# Patient Record
Sex: FEMALE | Race: WHITE | NOT HISPANIC OR LATINO
[De-identification: names, ages, dates, MRNs, and addresses within clinical notes are randomized per-mention and may not be internally consistent; named-entity substitution may affect disease eponyms.]

---

## 2017-01-26 ENCOUNTER — APPOINTMENT (OUTPATIENT)
Dept: OBGYN | Facility: CLINIC | Age: 22
End: 2017-01-26

## 2017-01-26 VITALS
SYSTOLIC BLOOD PRESSURE: 121 MMHG | DIASTOLIC BLOOD PRESSURE: 82 MMHG | HEART RATE: 101 BPM | BODY MASS INDEX: 24.1 KG/M2 | WEIGHT: 134.31 LBS | HEIGHT: 62.5 IN

## 2017-01-26 DIAGNOSIS — Z86.39 PERSONAL HISTORY OF OTHER ENDOCRINE, NUTRITIONAL AND METABOLIC DISEASE: ICD-10-CM

## 2017-01-26 DIAGNOSIS — Z01.419 ENCOUNTER FOR GYNECOLOGICAL EXAMINATION (GENERAL) (ROUTINE) W/OUT ABNORMAL FINDINGS: ICD-10-CM

## 2017-01-28 LAB
C TRACH DNA SPEC QL NAA+PROBE: NORMAL
C TRACH RRNA SPEC QL NAA+PROBE: NORMAL
CANDIDA VAG CYTO: NOT DETECTED
G VAGINALIS+PREV SP MTYP VAG QL MICRO: NOT DETECTED
HBV SURFACE AG SER QL: NONREACTIVE
HCV AB SER QL: NONREACTIVE
HCV S/CO RATIO: 0.15 S/CO
HIV1+2 AB SPEC QL IA.RAPID: NONREACTIVE
N GONORRHOEA DNA SPEC QL NAA+PROBE: NORMAL
N GONORRHOEA RRNA SPEC QL NAA+PROBE: NORMAL
SOURCE AMPLIFICATION: NORMAL
T PALLIDUM AB SER QL IA: NEGATIVE
T VAGINALIS VAG QL WET PREP: NOT DETECTED

## 2017-01-30 ENCOUNTER — OTHER (OUTPATIENT)
Age: 22
End: 2017-01-30

## 2017-11-30 ENCOUNTER — EMERGENCY (EMERGENCY)
Facility: HOSPITAL | Age: 22
LOS: 1 days | Discharge: HOME | End: 2017-11-30
Admitting: EMERGENCY MEDICINE
Payer: COMMERCIAL

## 2017-11-30 VITALS
SYSTOLIC BLOOD PRESSURE: 115 MMHG | OXYGEN SATURATION: 100 % | DIASTOLIC BLOOD PRESSURE: 85 MMHG | RESPIRATION RATE: 16 BRPM | TEMPERATURE: 98 F | HEART RATE: 88 BPM

## 2017-11-30 VITALS
TEMPERATURE: 98 F | HEART RATE: 92 BPM | RESPIRATION RATE: 18 BRPM | OXYGEN SATURATION: 99 % | SYSTOLIC BLOOD PRESSURE: 112 MMHG | DIASTOLIC BLOOD PRESSURE: 86 MMHG

## 2017-11-30 LAB
APPEARANCE UR: CLEAR — SIGNIFICANT CHANGE UP
BASE EXCESS BLDV CALC-SCNC: 5.9 MMOL/L — SIGNIFICANT CHANGE UP
BILIRUB UR-MCNC: NEGATIVE — SIGNIFICANT CHANGE UP
BLOOD GAS VENOUS - CREATININE: 0.65 MG/DL — SIGNIFICANT CHANGE UP (ref 0.5–1.3)
BLOOD UR QL VISUAL: NEGATIVE — SIGNIFICANT CHANGE UP
CHLORIDE BLDV-SCNC: 103 MMOL/L — SIGNIFICANT CHANGE UP (ref 96–108)
COLOR SPEC: YELLOW — SIGNIFICANT CHANGE UP
GAS PNL BLDV: 139 MMOL/L — SIGNIFICANT CHANGE UP (ref 136–146)
GLUCOSE BLDV-MCNC: 101 — HIGH (ref 70–99)
GLUCOSE UR-MCNC: NEGATIVE — SIGNIFICANT CHANGE UP
HCO3 BLDV-SCNC: 27 MMOL/L — SIGNIFICANT CHANGE UP (ref 20–27)
HCT VFR BLDV CALC: 47.1 % — HIGH (ref 34.5–45)
HGB BLDV-MCNC: 15.4 G/DL — SIGNIFICANT CHANGE UP (ref 11.5–15.5)
KETONES UR-MCNC: NEGATIVE — SIGNIFICANT CHANGE UP
LACTATE BLDV-MCNC: 1.6 MMOL/L — SIGNIFICANT CHANGE UP (ref 0.5–2)
LEUKOCYTE ESTERASE UR-ACNC: NEGATIVE — SIGNIFICANT CHANGE UP
MUCOUS THREADS # UR AUTO: SIGNIFICANT CHANGE UP
NITRITE UR-MCNC: NEGATIVE — SIGNIFICANT CHANGE UP
NON-SQ EPI CELLS # UR AUTO: <1 — SIGNIFICANT CHANGE UP
PCO2 BLDV: 56 MMHG — HIGH (ref 41–51)
PH BLDV: 7.36 PH — SIGNIFICANT CHANGE UP (ref 7.32–7.43)
PH UR: 6.5 — SIGNIFICANT CHANGE UP (ref 4.6–8)
PO2 BLDV: 31 MMHG — LOW (ref 35–40)
POTASSIUM BLDV-SCNC: 4.2 MMOL/L — SIGNIFICANT CHANGE UP (ref 3.4–4.5)
PROT UR-MCNC: 20 — SIGNIFICANT CHANGE UP
RBC CASTS # UR COMP ASSIST: SIGNIFICANT CHANGE UP (ref 0–?)
SAO2 % BLDV: 53.2 % — LOW (ref 60–85)
SP GR SPEC: 1.02 — SIGNIFICANT CHANGE UP (ref 1–1.03)
SQUAMOUS # UR AUTO: SIGNIFICANT CHANGE UP
UROBILINOGEN FLD QL: NORMAL E.U. — SIGNIFICANT CHANGE UP (ref 0.1–0.2)
WBC UR QL: SIGNIFICANT CHANGE UP (ref 0–?)

## 2017-11-30 PROCEDURE — 99284 EMERGENCY DEPT VISIT MOD MDM: CPT

## 2017-11-30 NOTE — ED PROVIDER NOTE - PLAN OF CARE
See your primary care doctor within 24-48 hours, bring copies of all reports with you. Advance your diet as tolerated. Return to the ER for worsening symptoms or any other concerns. See your primary care doctor within 24-48 hours, bring copies of all reports with you. Advance your diet as tolerated. Follow up with orthopedics for possible MRI of your back, referral list given. Return to the ER for worsening symptoms or any other concerns.

## 2017-11-30 NOTE — ED PROVIDER NOTE - MEDICAL DECISION MAKING DETAILS
21 y/o F with RLQ pain intermittent stabbing sensations 21 y/o F with RLQ pain intermittent stabbing sensations r/o ovarian cyst vs. torsion vs. appy- UCG, UA, Urine culture, Transvaginal US, CT Ab/pelivs

## 2017-11-30 NOTE — ED PROVIDER NOTE - PROGRESS NOTE DETAILS
PA Tiberio- labs negative. UA negative. US- trace free fluid, no ovarian cysts, good doppler flow. Awaiting results of CT scan. Pt stable. Signed out to JUAN Patrick JUAN Patrick: CT abdomen w/o acute intra-abdominal pathology. Pars interarticularis defects noted, pt aware of results. Will PO challenge and dc if pt tolerates. JUAN Patrick: CT abdomen w/o acute intra-abdominal pathology. Pars interarticularis defects noted, pt aware of results, will refer to spine. Will PO challenge and dc if pt tolerates.

## 2017-11-30 NOTE — ED ADULT TRIAGE NOTE - CHIEF COMPLAINT QUOTE
Patient c/o RLQ pain x3 days worsening yesterday. Patient c/o Nausea and diarrhea. Patient denies any blood in stool. Patient states pain is "dull then becomes stabbing and ripping". Denies any medical hx or sx. Seen at urgent care to r/o appendicitis and strep. Patient appears comfortable in triage.

## 2017-11-30 NOTE — ED PROVIDER NOTE - OBJECTIVE STATEMENT
23 y/o F started with sore throat 2 days ago- saw PCP- rapid strep negative. Started with RLQ pain x 2days. Describes it at "ripping and stabbing" constant but intensity changes intermittently. (+) nausea no vomiting. Denies dysuria, frequency of urination , hematuria, vaginal DC. LMP 11/19/17- states it was heavier than normal and clotty.  Sexually active- uses condoms "sometimes". No h/o STD's. Appears well. No distress on exam. States "I always have abdominal pain but never this bad". Previous transvaginal US beginning of this year showed possible ovarian cyst- pt cannot recall results.  Daily meds: OCP's  NKDA 21 y/o F started with sore throat 2 days ago- saw PCP- rapid strep negative. Started with RLQ pain shortly after x 2days. Describes it at constant, but changing in intensity at times "ripping and stabbing".  (+) nausea no vomiting. Denies dysuria, frequency of urination , hematuria, vaginal DC. LMP 11/19/17- states it was heavier than normal and clotty.  Sexually active- uses condoms "sometimes". No h/o STD's. Appears well. No distress on exam. States "I always have abdominal pain but never this bad". Previous transvaginal US beginning of this year showed possible ovarian cyst- pt cannot recall results.  Daily meds: OCP's  NKDA

## 2017-11-30 NOTE — ED PROVIDER NOTE - CARE PLAN
Principal Discharge DX:	Right lower quadrant abdominal pain Principal Discharge DX:	Right lower quadrant abdominal pain  Instructions for follow-up, activity and diet:	See your primary care doctor within 24-48 hours, bring copies of all reports with you. Advance your diet as tolerated. Return to the ER for worsening symptoms or any other concerns. Principal Discharge DX:	Right lower quadrant abdominal pain  Instructions for follow-up, activity and diet:	See your primary care doctor within 24-48 hours, bring copies of all reports with you. Advance your diet as tolerated. Follow up with orthopedics for possible MRI of your back, referral list given. Return to the ER for worsening symptoms or any other concerns.

## 2017-11-30 NOTE — ED ADULT NURSE NOTE - OBJECTIVE STATEMENT
Pt rcvd to int12 c./o rlq abd pain, x2days, intermittent and severe in nature.  Denies dysuria/hematuria/vaginal dc.  LMP 11/19/17.  Pt appearing well, no distress noted.  Endorses nausea, no vomiting. IV placed to R AC #20g, labs drawn/sent per orders ucg neg, urine sent, pt taken to US/CT. Awaiting results. stable at this time.

## 2017-12-01 LAB
ALBUMIN SERPL ELPH-MCNC: 4.4 G/DL — SIGNIFICANT CHANGE UP (ref 3.3–5)
ALP SERPL-CCNC: 62 U/L — SIGNIFICANT CHANGE UP (ref 40–120)
ALT FLD-CCNC: 23 U/L — SIGNIFICANT CHANGE UP (ref 4–33)
AST SERPL-CCNC: 21 U/L — SIGNIFICANT CHANGE UP (ref 4–32)
BASOPHILS # BLD AUTO: 0.04 K/UL — SIGNIFICANT CHANGE UP (ref 0–0.2)
BASOPHILS NFR BLD AUTO: 0.4 % — SIGNIFICANT CHANGE UP (ref 0–2)
BILIRUB SERPL-MCNC: 0.4 MG/DL — SIGNIFICANT CHANGE UP (ref 0.2–1.2)
BUN SERPL-MCNC: 15 MG/DL — SIGNIFICANT CHANGE UP (ref 7–23)
CALCIUM SERPL-MCNC: 9.9 MG/DL — SIGNIFICANT CHANGE UP (ref 8.4–10.5)
CHLORIDE SERPL-SCNC: 104 MMOL/L — SIGNIFICANT CHANGE UP (ref 98–107)
CO2 SERPL-SCNC: 29 MMOL/L — SIGNIFICANT CHANGE UP (ref 22–31)
CREAT SERPL-MCNC: 0.69 MG/DL — SIGNIFICANT CHANGE UP (ref 0.5–1.3)
EOSINOPHIL # BLD AUTO: 0.15 K/UL — SIGNIFICANT CHANGE UP (ref 0–0.5)
EOSINOPHIL NFR BLD AUTO: 1.5 % — SIGNIFICANT CHANGE UP (ref 0–6)
GLUCOSE SERPL-MCNC: 102 MG/DL — HIGH (ref 70–99)
HCT VFR BLD CALC: 42 % — SIGNIFICANT CHANGE UP (ref 34.5–45)
HGB BLD-MCNC: 15.1 G/DL — SIGNIFICANT CHANGE UP (ref 11.5–15.5)
IMM GRANULOCYTES # BLD AUTO: 0.04 # — SIGNIFICANT CHANGE UP
IMM GRANULOCYTES NFR BLD AUTO: 0.4 % — SIGNIFICANT CHANGE UP (ref 0–1.5)
LYMPHOCYTES # BLD AUTO: 2.34 K/UL — SIGNIFICANT CHANGE UP (ref 1–3.3)
LYMPHOCYTES # BLD AUTO: 23.1 % — SIGNIFICANT CHANGE UP (ref 13–44)
MCHC RBC-ENTMCNC: 31.5 PG — SIGNIFICANT CHANGE UP (ref 27–34)
MCHC RBC-ENTMCNC: 36 % — SIGNIFICANT CHANGE UP (ref 32–36)
MCV RBC AUTO: 87.7 FL — SIGNIFICANT CHANGE UP (ref 80–100)
MONOCYTES # BLD AUTO: 0.69 K/UL — SIGNIFICANT CHANGE UP (ref 0–0.9)
MONOCYTES NFR BLD AUTO: 6.8 % — SIGNIFICANT CHANGE UP (ref 2–14)
NEUTROPHILS # BLD AUTO: 6.86 K/UL — SIGNIFICANT CHANGE UP (ref 1.8–7.4)
NEUTROPHILS NFR BLD AUTO: 67.8 % — SIGNIFICANT CHANGE UP (ref 43–77)
NRBC # FLD: 0 — SIGNIFICANT CHANGE UP
PLATELET # BLD AUTO: 223 K/UL — SIGNIFICANT CHANGE UP (ref 150–400)
PMV BLD: 9.4 FL — SIGNIFICANT CHANGE UP (ref 7–13)
POTASSIUM SERPL-MCNC: 4.8 MMOL/L — SIGNIFICANT CHANGE UP (ref 3.5–5.3)
POTASSIUM SERPL-SCNC: 4.8 MMOL/L — SIGNIFICANT CHANGE UP (ref 3.5–5.3)
PROT SERPL-MCNC: 7.3 G/DL — SIGNIFICANT CHANGE UP (ref 6–8.3)
RBC # BLD: 4.79 M/UL — SIGNIFICANT CHANGE UP (ref 3.8–5.2)
RBC # FLD: 12.1 % — SIGNIFICANT CHANGE UP (ref 10.3–14.5)
SODIUM SERPL-SCNC: 145 MMOL/L — SIGNIFICANT CHANGE UP (ref 135–145)
WBC # BLD: 10.12 K/UL — SIGNIFICANT CHANGE UP (ref 3.8–10.5)
WBC # FLD AUTO: 10.12 K/UL — SIGNIFICANT CHANGE UP (ref 3.8–10.5)

## 2017-12-01 PROCEDURE — 76830 TRANSVAGINAL US NON-OB: CPT | Mod: 26

## 2017-12-01 PROCEDURE — 74177 CT ABD & PELVIS W/CONTRAST: CPT | Mod: 26

## 2017-12-01 RX ORDER — KETOROLAC TROMETHAMINE 30 MG/ML
30 SYRINGE (ML) INJECTION ONCE
Qty: 0 | Refills: 0 | Status: DISCONTINUED | OUTPATIENT
Start: 2017-12-01 | End: 2017-12-01

## 2017-12-01 RX ADMIN — Medication 30 MILLIGRAM(S): at 02:11

## 2017-12-02 LAB
BACTERIA UR CULT: SIGNIFICANT CHANGE UP
SPECIMEN SOURCE: SIGNIFICANT CHANGE UP

## 2017-12-05 ENCOUNTER — APPOINTMENT (OUTPATIENT)
Dept: OBGYN | Facility: CLINIC | Age: 22
End: 2017-12-05
Payer: COMMERCIAL

## 2017-12-05 VITALS
SYSTOLIC BLOOD PRESSURE: 106 MMHG | BODY MASS INDEX: 25.5 KG/M2 | HEIGHT: 62.5 IN | DIASTOLIC BLOOD PRESSURE: 71 MMHG | WEIGHT: 142.13 LBS | HEART RATE: 93 BPM

## 2017-12-05 PROCEDURE — 99214 OFFICE O/P EST MOD 30 MIN: CPT

## 2017-12-05 RX ORDER — FLUTICASONE PROPIONATE 50 UG/1
50 SPRAY, METERED NASAL
Qty: 16 | Refills: 0 | Status: COMPLETED | COMMUNITY
Start: 2017-11-21

## 2017-12-05 RX ORDER — PREDNISONE 20 MG/1
20 TABLET ORAL
Qty: 10 | Refills: 0 | Status: COMPLETED | COMMUNITY
Start: 2017-07-17

## 2017-12-05 RX ORDER — AZITHROMYCIN 250 MG/1
250 TABLET, FILM COATED ORAL
Qty: 6 | Refills: 0 | Status: COMPLETED | COMMUNITY
Start: 2017-07-17

## 2017-12-05 RX ORDER — DICLOFENAC SODIUM 3 G/100G
3 GEL TOPICAL
Qty: 100 | Refills: 0 | Status: COMPLETED | COMMUNITY
Start: 2017-11-20

## 2017-12-05 RX ORDER — LIDOCAINE 5 G/100G
5 OINTMENT TOPICAL
Qty: 35 | Refills: 0 | Status: COMPLETED | COMMUNITY
Start: 2017-11-20

## 2017-12-08 LAB
C TRACH RRNA SPEC QL NAA+PROBE: NOT DETECTED
CANDIDA VAG CYTO: NOT DETECTED
G VAGINALIS+PREV SP MTYP VAG QL MICRO: NOT DETECTED
N GONORRHOEA RRNA SPEC QL NAA+PROBE: NOT DETECTED
SOURCE AMPLIFICATION: NORMAL
T VAGINALIS VAG QL WET PREP: NOT DETECTED

## 2018-11-05 ENCOUNTER — APPOINTMENT (OUTPATIENT)
Dept: PEDIATRIC ORTHOPEDIC SURGERY | Facility: CLINIC | Age: 23
End: 2018-11-05
Payer: COMMERCIAL

## 2018-11-05 PROCEDURE — 99215 OFFICE O/P EST HI 40 MIN: CPT | Mod: 25

## 2018-11-05 PROCEDURE — 72082 X-RAY EXAM ENTIRE SPI 2/3 VW: CPT

## 2018-11-09 ENCOUNTER — APPOINTMENT (OUTPATIENT)
Dept: CARDIOLOGY | Facility: CLINIC | Age: 23
End: 2018-11-09
Payer: COMMERCIAL

## 2018-11-09 ENCOUNTER — NON-APPOINTMENT (OUTPATIENT)
Age: 23
End: 2018-11-09

## 2018-11-09 VITALS
HEIGHT: 62.5 IN | RESPIRATION RATE: 12 BRPM | BODY MASS INDEX: 26.74 KG/M2 | WEIGHT: 149 LBS | HEART RATE: 101 BPM | SYSTOLIC BLOOD PRESSURE: 122 MMHG | OXYGEN SATURATION: 96 % | DIASTOLIC BLOOD PRESSURE: 73 MMHG

## 2018-11-09 PROCEDURE — 99204 OFFICE O/P NEW MOD 45 MIN: CPT

## 2018-11-09 PROCEDURE — 93000 ELECTROCARDIOGRAM COMPLETE: CPT

## 2018-11-09 NOTE — REASON FOR VISIT
[FreeTextEntry1] : 23 year old female.  Recently diagnosed with spondylolithesis (grade 4).  She needs to have surgery for this.  The surgery is scheduled for november 28th, at [facility] with Dr. Gooden and Dr. Sr  She had Xrays of the lumbar spine, she is awaiting for an MRI of the spine.  Currently she is limited in her activity due to back pain.  This occurred spontaneously, has been in pain since august.  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 \par Surgery will be with general anesthesia.  Describes a 2.5 hour procedure with 2 rods.  \par \par Medications:\par Junel  - OCP to regulate her period.  Has been on this 6 years.  Her ob is Merrill Reyes.  \par Tramadol as needed.\par \par Never smoker\par Social ETOH\par \par Family history - no family history of heart disease. \par Brother 17, healthy\par \par No VTE.  NO family history of VTE.  \par \par

## 2018-11-09 NOTE — ASSESSMENT
[FreeTextEntry1] : Assessment:. \par 1.  Pre-operative Cardiac Risk stratification\par 2.  Spondylolisthesis\par 3.  On OCP\par \par Plan\par 1.  Consider stopping Junel (it contains estrogen) and can increase her risk of VTE during surgery.  She will see her OB/GYN to discuss this.  \par 2.  She has good exercise tolerance, no chest pain, no arrhythmia, no murmur on exam, no ischemic changes on ecg.  She may proceed with planned orthopedic surgery without any additional cardiac testing\par 3.  DVT prophylaxis in the perioperative period\par 4.  Return post operatively for followup. \par \par \par Teresa

## 2018-11-09 NOTE — PHYSICAL EXAM
[General Appearance - Well Developed] : well developed [Normal Appearance] : normal appearance [Well Groomed] : well groomed [General Appearance - Well Nourished] : well nourished [No Deformities] : no deformities [General Appearance - In No Acute Distress] : no acute distress [Normal Conjunctiva] : the conjunctiva exhibited no abnormalities [Eyelids - No Xanthelasma] : the eyelids demonstrated no xanthelasmas [Normal Oral Mucosa] : normal oral mucosa [No Oral Pallor] : no oral pallor [No Oral Cyanosis] : no oral cyanosis [Normal Jugular Venous A Waves Present] : normal jugular venous A waves present [Normal Jugular Venous V Waves Present] : normal jugular venous V waves present [No Jugular Venous Baca A Waves] : no jugular venous baca A waves [Heart Rate And Rhythm] : heart rate and rhythm were normal [Heart Sounds] : normal S1 and S2 [Murmurs] : no murmurs present [Respiration, Rhythm And Depth] : normal respiratory rhythm and effort [Exaggerated Use Of Accessory Muscles For Inspiration] : no accessory muscle use [Auscultation Breath Sounds / Voice Sounds] : lungs were clear to auscultation bilaterally [Abdomen Soft] : soft [Abdomen Tenderness] : non-tender [Abdomen Mass (___ Cm)] : no abdominal mass palpated [Nail Clubbing] : no clubbing of the fingernails [Cyanosis, Localized] : no localized cyanosis [Petechial Hemorrhages (___cm)] : no petechial hemorrhages [Skin Color & Pigmentation] : normal skin color and pigmentation [] : no rash [No Venous Stasis] : no venous stasis [Skin Lesions] : no skin lesions [No Skin Ulcers] : no skin ulcer [No Xanthoma] : no  xanthoma was observed

## 2018-11-13 ENCOUNTER — APPOINTMENT (OUTPATIENT)
Dept: UROLOGY | Facility: CLINIC | Age: 23
End: 2018-11-13
Payer: COMMERCIAL

## 2018-11-13 VITALS — SYSTOLIC BLOOD PRESSURE: 120 MMHG | DIASTOLIC BLOOD PRESSURE: 78 MMHG

## 2018-11-13 DIAGNOSIS — M43.17 SPONDYLOLISTHESIS, LUMBOSACRAL REGION: ICD-10-CM

## 2018-11-13 PROCEDURE — 99203 OFFICE O/P NEW LOW 30 MIN: CPT

## 2018-11-15 ENCOUNTER — LABORATORY RESULT (OUTPATIENT)
Age: 23
End: 2018-11-15

## 2018-11-15 ENCOUNTER — APPOINTMENT (OUTPATIENT)
Dept: OBGYN | Facility: CLINIC | Age: 23
End: 2018-11-15
Payer: COMMERCIAL

## 2018-11-15 VITALS
BODY MASS INDEX: 26.55 KG/M2 | SYSTOLIC BLOOD PRESSURE: 118 MMHG | HEIGHT: 62.5 IN | DIASTOLIC BLOOD PRESSURE: 77 MMHG | HEART RATE: 109 BPM | WEIGHT: 148 LBS

## 2018-11-15 DIAGNOSIS — N76.0 ACUTE VAGINITIS: ICD-10-CM

## 2018-11-15 DIAGNOSIS — B96.89 ACUTE VAGINITIS: ICD-10-CM

## 2018-11-15 PROCEDURE — 99213 OFFICE O/P EST LOW 20 MIN: CPT

## 2018-11-16 ENCOUNTER — OUTPATIENT (OUTPATIENT)
Dept: OUTPATIENT SERVICES | Age: 23
LOS: 1 days | End: 2018-11-16

## 2018-11-16 VITALS
RESPIRATION RATE: 16 BRPM | HEIGHT: 63.07 IN | TEMPERATURE: 98 F | WEIGHT: 147.27 LBS | HEART RATE: 93 BPM | OXYGEN SATURATION: 97 % | DIASTOLIC BLOOD PRESSURE: 65 MMHG | SYSTOLIC BLOOD PRESSURE: 106 MMHG

## 2018-11-16 DIAGNOSIS — M43.17 SPONDYLOLISTHESIS, LUMBOSACRAL REGION: ICD-10-CM

## 2018-11-16 DIAGNOSIS — F41.9 ANXIETY DISORDER, UNSPECIFIED: ICD-10-CM

## 2018-11-16 DIAGNOSIS — Z98.890 OTHER SPECIFIED POSTPROCEDURAL STATES: Chronic | ICD-10-CM

## 2018-11-16 LAB
BLD GP AB SCN SERPL QL: NEGATIVE — SIGNIFICANT CHANGE UP
BUN SERPL-MCNC: 12 MG/DL — SIGNIFICANT CHANGE UP (ref 7–23)
CALCIUM SERPL-MCNC: 10.3 MG/DL — SIGNIFICANT CHANGE UP (ref 8.4–10.5)
CHLORIDE SERPL-SCNC: 102 MMOL/L — SIGNIFICANT CHANGE UP (ref 98–107)
CO2 SERPL-SCNC: 25 MMOL/L — SIGNIFICANT CHANGE UP (ref 22–31)
CREAT SERPL-MCNC: 0.69 MG/DL — SIGNIFICANT CHANGE UP (ref 0.5–1.3)
GLUCOSE SERPL-MCNC: 85 MG/DL — SIGNIFICANT CHANGE UP (ref 70–99)
HCG SERPL-ACNC: < 5 MIU/ML — SIGNIFICANT CHANGE UP
HCT VFR BLD CALC: 46.1 % — HIGH (ref 34.5–45)
HGB BLD-MCNC: 15.8 G/DL — HIGH (ref 11.5–15.5)
MCHC RBC-ENTMCNC: 31.3 PG — SIGNIFICANT CHANGE UP (ref 27–34)
MCHC RBC-ENTMCNC: 34.3 % — SIGNIFICANT CHANGE UP (ref 32–36)
MCV RBC AUTO: 91.3 FL — SIGNIFICANT CHANGE UP (ref 80–100)
NRBC # FLD: 0 — SIGNIFICANT CHANGE UP
PLATELET # BLD AUTO: 267 K/UL — SIGNIFICANT CHANGE UP (ref 150–400)
PMV BLD: 9.3 FL — SIGNIFICANT CHANGE UP (ref 7–13)
POTASSIUM SERPL-MCNC: 5.2 MMOL/L — SIGNIFICANT CHANGE UP (ref 3.5–5.3)
POTASSIUM SERPL-SCNC: 5.2 MMOL/L — SIGNIFICANT CHANGE UP (ref 3.5–5.3)
RBC # BLD: 5.05 M/UL — SIGNIFICANT CHANGE UP (ref 3.8–5.2)
RBC # FLD: 12.4 % — SIGNIFICANT CHANGE UP (ref 10.3–14.5)
RH IG SCN BLD-IMP: POSITIVE — SIGNIFICANT CHANGE UP
SODIUM SERPL-SCNC: 140 MMOL/L — SIGNIFICANT CHANGE UP (ref 135–145)
WBC # BLD: 6.88 K/UL — SIGNIFICANT CHANGE UP (ref 3.8–10.5)
WBC # FLD AUTO: 6.88 K/UL — SIGNIFICANT CHANGE UP (ref 3.8–10.5)

## 2018-11-16 NOTE — H&P PST ADULT - PROBLEM SELECTOR PLAN 2
Discussed pre sedation with patient and in agreement she would benefit from pre sedation. Order place on hold.

## 2018-11-16 NOTE — H&P PST ADULT - GENITOURINARY COMMENTS
reports she was recently treated for Chlamydia, followed up with GYN on 11/15 and symptoms have resolved.

## 2018-11-16 NOTE — H&P PST ADULT - NSANTHOSAYNRD_GEN_A_CORE
No. LEIGH screening performed.  STOP BANG Legend: 0-2 = LOW Risk; 3-4 = INTERMEDIATE Risk; 5-8 = HIGH Risk

## 2018-11-16 NOTE — H&P PST ADULT - GASTROINTESTINAL COMMENTS
reports chronic "stomach issues" and diarrhea. Reports had endoscopy and colonoscopy done which showed areas of irritation related to reflux and IBS.

## 2018-11-16 NOTE — H&P PST ADULT - ANESTHESIA, PREVIOUS REACTION, PROFILE
h/o of colonoscopy/ endoscopy for which patient reports she woke up agitated. No prior exposure to GA

## 2018-11-16 NOTE — H&P PST ADULT - ASSESSMENT
24yo female with PMHx of anxiety, depression and spondylolisthesis, no PSH., patient has undergone endoscopy and colonoscopy in the past and reports she was told she woke up er agitated. CBC, BMP, T/S and HCG sent as indicated today. Urine cup with detailed instructions given for DOS.  No evidence of acute illness or infection. Child life prep with family. CHG wipes given and detailed instructions given.

## 2018-11-16 NOTE — H&P PST ADULT - BACK COMMENTS
vertebral tenderness on palpation of lower back with pain radiating to bilateral flank, worse on right than left

## 2018-11-16 NOTE — H&P PST ADULT - MUSCULOSKELETAL COMMENTS
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 Found to have Grade 4 Spondylolisthesis, scheduled for PSF T4-L4 posterior spinal fusion with instrumentation with Dr. Gooden on 11/28/18.

## 2018-11-16 NOTE — H&P PST ADULT - ENMT COMMENTS
reports h/o frequent nose bleeds, had nose cauterized x2, no recent nose bleeds, easy to stop bleeding

## 2018-11-16 NOTE — H&P PST ADULT - PROBLEM SELECTOR PLAN 1
T4-L4 posterior spinal fusion with instrumentation with possible interbody with Dr Gooden and Dr. Pollack on 11/28/18 at [facility].

## 2018-11-16 NOTE — H&P PST ADULT - OTHER CARE PROVIDERS
Dr. Moore (Cards) Dr. Moore (Cards) Dr. Munir Reyes (OB GYN) Dr. Moore (Cards) Dr. Munir Reyes (OB GYN), therapist

## 2018-11-16 NOTE — H&P PST ADULT - REASON FOR ADMISSION
PST evaluation prior to T4-L4 posterior spinal fusion with instrumentation with possible interbody with Dr Gooden and Dr. Pollack on 11/28/18 at [facility].

## 2018-11-16 NOTE — H&P PST ADULT - PSYCHIATRIC COMMENTS
Follows with therapist for h/o depression and anxiety. H/o of suicidal ideation. Reports feeling supported by family regarding upcoming surgery.

## 2018-11-17 LAB
BACTERIA UR CULT: NORMAL
CANDIDA VAG CYTO: NOT DETECTED
G VAGINALIS+PREV SP MTYP VAG QL MICRO: DETECTED
HBV SURFACE AG SER QL: NONREACTIVE
HCG SERPL-MCNC: <1 MIU/ML
HCV AB SER QL: NONREACTIVE
HCV S/CO RATIO: 0.1 S/CO
HIV1+2 AB SPEC QL IA.RAPID: NONREACTIVE
T PALLIDUM AB SER QL IA: NEGATIVE
T VAGINALIS VAG QL WET PREP: NOT DETECTED

## 2018-11-23 PROBLEM — M41.125 ADOLESCENT IDIOPATHIC SCOLIOSIS OF THORACOLUMBAR REGION: Status: ACTIVE | Noted: 2018-11-02

## 2018-11-24 NOTE — ASSESSMENT
[FreeTextEntry1] : 23 year old female presents with Grade 4 Spondylolisthesis. Clinical imaging and exam were reviewed with patient and parent at length. Patient and parent understands this condition requires surgical correction. Surgery was discussed in detail. The estela-operative plan and care was also explained. The patient will be scheduled for posterior spinal fusion with BMP and instrumentation: DOS Nov 28th. I am sending her for an MRI of the entire spine to rule out nerve abnormalities.  All the risks and complications of surgery including the risk of infection, nonunion, implant failure, complete paralysis, incomplete paralysis, bladder/bowel paralysis, organ injury, vascular injury, mortality, CSF leak, pleural leak, decompensation, resurgery, extension of fusion, junctional kyphosis, arthritis, organ injury, vascular injury, mortality, screw misplacement, and need for screw removal were explained. Motrin was recommended for pain management until surgery. Pt was advised not to get pregnant for a year after surgery due to side effects of BMP. All questions were answered. Understanding verbalized. Will follow up with me for further preoperative discussion once all testing has been completed.

## 2018-11-24 NOTE — HISTORY OF PRESENT ILLNESS
[Stable] : stable [6] : currently ~his/her~ pain is 6 out of 10 [Daily] : ~He/She~ states the symptoms seem to be occuring daily [FreeTextEntry1] : 23 year old female presents for lower back pain evaluation. 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 This is a new injury unrelated to the usual back problems she has had as a result of being an active dancer. She reports the pain radiates to the lower sides of her back. She is unable to cough or sneeze standing up. Pt denies numbness/tingling/weakness to the LE, radiating LE pain, and bladder/bowel dysfunction. She is here for evaluation and management of the pain.

## 2018-11-24 NOTE — REASON FOR VISIT
[Initial Evaluation] : an initial evaluation [Patient] : patient [Mother] : mother [FreeTextEntry1] : back pain

## 2018-11-24 NOTE — PHYSICAL EXAM
[FreeTextEntry1] : General: Patient is awake and alert and in no acute distress.\par Skin: The skin is intact, warm, pink, and dry over the area examined.\par Eyes: Pupils are equally round. Extraocular movements are intact. There is no gross deformity appreciated.\par ENT: normal ears, normal nose and normal lips.\par Cardiovascular: There is brisk capillary refill in the digits of the affected extremity. They are symmetric pulses in the bilateral upper and lower extremities.\par Respiratory: The patient is in no apparent respiratory distress. They're taking full deep breaths without use of accessory muscles or evidence of audible wheezes or stridor without the use of a stethoscope.\par Neurological: 5/5 motor strength in the main muscle groups of bilateral upper and lower extremities, sensory intact in bilateral upper and lower extremities.2+/Symmetric deep tendon reflexes were present in bilateral biceps and bilateral achilles . abdominal deep tendon reflexes are symmetrical in all 4 quadrants.\par Musculoskeletal:. normal gait for age. normal clinical alignment in upper and lower extremities. full range of motion in bilateral upper and lower extremities.\par  \par -No neurologic deficits.\par -lumbar spine has palpable stepoff.

## 2018-11-24 NOTE — DATA REVIEWED
[de-identified] : AP/lateral/oblique Xray of the spine reviewed today demonstrates l5/s1 Spondylolisthesis.

## 2018-11-24 NOTE — ADDENDUM
[FreeTextEntry1] : Documented by Lindsey Hung acting as a scribe for Dr. Yosi Gooden on 11/05/2018 .\par All medical record entries made by the Scribe were at my, Dr. Gooden, direction and personally dictated by me on 11/05/2018 . I have reviewed the chart and agree that the record accurately reflects my personal performance of the history, physical exam, assessment and plan. I have also personally directed, reviewed, and agree with the discharge instructions.

## 2018-11-26 ENCOUNTER — APPOINTMENT (OUTPATIENT)
Dept: PULMONOLOGY | Facility: CLINIC | Age: 23
End: 2018-11-26

## 2018-11-26 ENCOUNTER — APPOINTMENT (OUTPATIENT)
Dept: PEDIATRIC ORTHOPEDIC SURGERY | Facility: CLINIC | Age: 23
End: 2018-11-26

## 2018-11-26 DIAGNOSIS — M41.125 ADOLESCENT IDIOPATHIC SCOLIOSIS, THORACOLUMBAR REGION: ICD-10-CM

## 2018-12-27 ENCOUNTER — APPOINTMENT (OUTPATIENT)
Dept: PEDIATRIC ORTHOPEDIC SURGERY | Facility: CLINIC | Age: 23
End: 2018-12-27

## 2019-01-22 ENCOUNTER — APPOINTMENT (OUTPATIENT)
Dept: OBGYN | Facility: CLINIC | Age: 24
End: 2019-01-22

## 2019-02-07 ENCOUNTER — EMERGENCY (EMERGENCY)
Facility: HOSPITAL | Age: 24
LOS: 1 days | Discharge: HOME | End: 2019-02-07
Attending: EMERGENCY MEDICINE | Admitting: EMERGENCY MEDICINE
Payer: COMMERCIAL

## 2019-02-07 VITALS
OXYGEN SATURATION: 98 % | SYSTOLIC BLOOD PRESSURE: 114 MMHG | DIASTOLIC BLOOD PRESSURE: 66 MMHG | RESPIRATION RATE: 16 BRPM | HEART RATE: 119 BPM | TEMPERATURE: 98 F

## 2019-02-07 VITALS
RESPIRATION RATE: 15 BRPM | OXYGEN SATURATION: 98 % | DIASTOLIC BLOOD PRESSURE: 66 MMHG | SYSTOLIC BLOOD PRESSURE: 108 MMHG | HEART RATE: 96 BPM

## 2019-02-07 DIAGNOSIS — Z98.890 OTHER SPECIFIED POSTPROCEDURAL STATES: Chronic | ICD-10-CM

## 2019-02-07 PROCEDURE — 99283 EMERGENCY DEPT VISIT LOW MDM: CPT

## 2019-02-07 RX ORDER — HYDROXYZINE HCL 10 MG
50 TABLET ORAL ONCE
Qty: 0 | Refills: 0 | Status: COMPLETED | OUTPATIENT
Start: 2019-02-07 | End: 2019-02-07

## 2019-02-07 NOTE — ED PROVIDER NOTE - SKIN, MLM
Diffuse blanching erythematous lesions with central clearing of various size, no oral lesions, or involvement of palms and soles

## 2019-02-07 NOTE — ED PROVIDER NOTE - OBJECTIVE STATEMENT
23 F h/o anxiety, s/p spinal fusion in december, p/w rash. States 5 days ago, noticed redness and itching to bilateral knees. Next day started to spread, went to urgent care and given prednisone and benadryl. yesterday rash was even worse, more diffuse, painful and itchy. States it is constantly changing with lesions changing in size/shape/and coallescing. Went to a dermatologist yesterday and told to continue prednisone and started on zyrtec and triamcinolone (only started the cream today). Still with diffuse itching, and unable to sleep. No rash on palms or soles or sores in mouth. No recent travel. No new medication. No recent illnesses. no fever/chills. 23 F h/o anxiety, s/p spinal fusion in december, p/w rash. States 5 days ago, noticed redness and itching to bilateral knees. Next day started to spread, went to urgent care and given prednisone and benadryl. yesterday rash was even worse, more diffuse, painful and itchy. States it is constantly changing with lesions changing in size/shape/and coallescing. Went to a dermatologist yesterday and told to continue prednisone and started on zyrtec and triamcinolone (only started the cream today). Still with diffuse itching, and unable to sleep. No rash on palms or soles or sores in mouth. No recent travel. No new medication. No recent illnesses. no fever/chills. No sick contacts. No difficulty breathing or swallowing. No skin sloughing.

## 2019-02-07 NOTE — ED PROVIDER NOTE - NSFOLLOWUPINSTRUCTIONS_ED_ALL_ED_FT
You were seen today for a rash. You should continue taking prednisone and start taking atarax for itching. Also continue to use triamcinolone. This is likely viral. Return if you have fever, involvement of the mouth/rectum/vagina, confusion or lethargy. We emailed [facility] for rapid appointment.

## 2019-02-07 NOTE — ED PROVIDER NOTE - MEDICAL DECISION MAKING DETAILS
23 F with diffuse erythematous rash with central clearing without MM involvement. Anxious and crying but otherwise non toxic appearing. Possible urticaria vs EM. Will treat symptomatically

## 2019-02-07 NOTE — ED PROVIDER NOTE - CPE EDP SKIN NORM
normal... MD Graf:  patient seen and evaluated with the PA.  I was present for key portions of the History and Physical, and I agree with the Impression and Plan.    MD Graf:  46 yo M, c/o acute (days) on chronic (months) fever.  Tm 103 at home.  Also c/o acute (days) on chronic (years) upper abdominal pain.  patient has had multiple admits for fevers of various sources (influenza B, SBP).  Has Hx of latent TB; but AFB smears are negative (5/2017)  Mhx of alcoholic cirrhosis.  VS - wnl.  Physical Exam: adult M, chronically ill-appearing, NCAT, PERRL, skin:  vitiligo.  Abd:  slightly distended, mild upper abd TTP; no R/G.  Impression:  febrile illness in chronically ill male.  Plan:  fever w/u, reassess, IVF.

## 2019-02-07 NOTE — ED PROVIDER NOTE - CONSTITUTIONAL, MLM
normal... Crying, well nourished, awake, alert, oriented to person, place, time/situation and in no apparent distress.

## 2019-02-07 NOTE — ED PROVIDER NOTE - ATTENDING CONTRIBUTION TO CARE
AJM: Patient seen with resident and agree with above note. 23 F h/o anxiety, s/p spinal fusion in december, p/w rash. States 5 days ago, noticed redness and itching to bilateral knees. Next day started to spread, went to urgent care and given prednisone and benadryl. yesterday rash was even worse, more diffuse, painful and itchy. States it is constantly changing with lesions changing in size/shape/and coallescing. Went to a dermatologist yesterday and told to continue prednisone and started on zyrtec and triamcinolone (only started the cream today). Still with diffuse itching, and unable to sleep. No rash on palms or soles or sores in mouth. No recent travel. No new medication. No recent illnesses. no fever/chills. No sick contacts. No difficulty breathing or swallowing. No skin sloughing. Exam with blanching discrete areas of erythema, itchy not tender. No MM involvement. Normal resp exam. will add atararx, continue prednisone, email derm for appt in AM

## 2019-02-07 NOTE — ED PROVIDER NOTE - PROGRESS NOTE DETAILS
emailed [facility] for rapid follow up. vss. home with continued prednisone and atarax. return for fever, mucous membrane involvement, lethargy

## 2019-02-08 PROBLEM — M43.17 SPONDYLOLISTHESIS, LUMBOSACRAL REGION: Chronic | Status: ACTIVE | Noted: 2018-11-16

## 2019-02-08 RX ORDER — HYDROXYZINE HCL 10 MG
1 TABLET ORAL
Qty: 20 | Refills: 0
Start: 2019-02-08 | End: 2019-02-12

## 2019-02-08 NOTE — ED POST DISCHARGE NOTE - OTHER COMMUNICATION
JUAN CROFT: Received call from patient's mother, states pt didn't received Atarax in the pharmacy; chart reviewed; patient was treated w/ Atarax in the ER for itchiness and rash, d/c instruction was to continue Atarax and Prednisone. No pharmacy was setup in the system. Atarax 25mg Q6H prescribed to preferred pharmacy. Pt has f/u appointment with Dermatology on Monday.

## 2019-02-11 ENCOUNTER — APPOINTMENT (OUTPATIENT)
Dept: DERMATOLOGY | Facility: CLINIC | Age: 24
End: 2019-02-11
Payer: COMMERCIAL

## 2019-02-11 VITALS
BODY MASS INDEX: 24.8 KG/M2 | HEIGHT: 63 IN | DIASTOLIC BLOOD PRESSURE: 60 MMHG | WEIGHT: 140 LBS | SYSTOLIC BLOOD PRESSURE: 110 MMHG

## 2019-02-11 PROCEDURE — 99203 OFFICE O/P NEW LOW 30 MIN: CPT

## 2019-03-11 ENCOUNTER — APPOINTMENT (OUTPATIENT)
Dept: DERMATOLOGY | Facility: CLINIC | Age: 24
End: 2019-03-11
Payer: COMMERCIAL

## 2019-03-11 PROCEDURE — 99213 OFFICE O/P EST LOW 20 MIN: CPT

## 2019-05-19 NOTE — ED ADULT NURSE NOTE - NS ED NURSE LEVEL OF CONSCIOUSNESS SPEECH
Patient Seen in: [facility] Emergency Department    History   Patient presents with:  Laceration Abrasion (integumentary)    Stated Complaint: head lac, denies loc    HPI    Patient is an 8year-old who was leaning over to  that she when he Peripheral pulses are brisk in all 4 extremities. Normal capillary refill. SKIN: Well perfused, without cyanosis. No rashes. NEUROLOGIC: Cranial nerves II through XII are intact moving all extremities normally. No focal deficits visualized.   Negative Speaking Coherently

## 2019-06-10 ENCOUNTER — LABORATORY RESULT (OUTPATIENT)
Age: 24
End: 2019-06-10

## 2019-06-10 ENCOUNTER — APPOINTMENT (OUTPATIENT)
Dept: DERMATOLOGY | Facility: CLINIC | Age: 24
End: 2019-06-10
Payer: COMMERCIAL

## 2019-06-10 PROCEDURE — 99214 OFFICE O/P EST MOD 30 MIN: CPT

## 2019-06-11 LAB
ALBUMIN SERPL ELPH-MCNC: 5 G/DL
ALP BLD-CCNC: 67 U/L
ALT SERPL-CCNC: 11 U/L
ANION GAP SERPL CALC-SCNC: 11 MMOL/L
APPEARANCE: ABNORMAL
AST SERPL-CCNC: 12 U/L
BASOPHILS # BLD AUTO: 0.04 K/UL
BASOPHILS NFR BLD AUTO: 0.5 %
BILIRUB SERPL-MCNC: 0.4 MG/DL
BILIRUBIN URINE: NEGATIVE
BLOOD URINE: NEGATIVE
BUN SERPL-MCNC: 15 MG/DL
CALCIUM SERPL-MCNC: 10.3 MG/DL
CHLORIDE SERPL-SCNC: 104 MMOL/L
CO2 SERPL-SCNC: 26 MMOL/L
COLOR: YELLOW
CREAT SERPL-MCNC: 0.65 MG/DL
EOSINOPHIL # BLD AUTO: 0.1 K/UL
EOSINOPHIL NFR BLD AUTO: 1.1 %
GLUCOSE QUALITATIVE U: NEGATIVE
GLUCOSE SERPL-MCNC: 89 MG/DL
HCT VFR BLD CALC: 41.6 %
HGB BLD-MCNC: 13.6 G/DL
IMM GRANULOCYTES NFR BLD AUTO: 0.3 %
KETONES URINE: NEGATIVE
LEUKOCYTE ESTERASE URINE: NEGATIVE
LYMPHOCYTES # BLD AUTO: 1.9 K/UL
LYMPHOCYTES NFR BLD AUTO: 21.6 %
MAN DIFF?: NORMAL
MCHC RBC-ENTMCNC: 29.8 PG
MCHC RBC-ENTMCNC: 32.7 GM/DL
MCV RBC AUTO: 91.2 FL
MONOCYTES # BLD AUTO: 0.53 K/UL
MONOCYTES NFR BLD AUTO: 6 %
NEUTROPHILS # BLD AUTO: 6.21 K/UL
NEUTROPHILS NFR BLD AUTO: 70.5 %
NITRITE URINE: NEGATIVE
PH URINE: 6
PLATELET # BLD AUTO: 278 K/UL
POTASSIUM SERPL-SCNC: 5 MMOL/L
PROT SERPL-MCNC: 6.9 G/DL
PROTEIN URINE: NORMAL
RBC # BLD: 4.56 M/UL
RBC # FLD: 13 %
SODIUM SERPL-SCNC: 141 MMOL/L
SPECIFIC GRAVITY URINE: 1.03
TSH SERPL-ACNC: 1.06 UIU/ML
UROBILINOGEN URINE: NORMAL
WBC # FLD AUTO: 8.81 K/UL

## 2019-06-13 LAB — TSH RECEPTOR AB: <0.5 IU/L

## 2019-06-22 NOTE — ED PROVIDER NOTE - MUSCULOSKELETAL, MLM
Spine appears normal, range of motion is not limited, no muscle or joint tenderness Hand pain, right

## 2019-07-16 ENCOUNTER — APPOINTMENT (OUTPATIENT)
Dept: DERMATOLOGY | Facility: CLINIC | Age: 24
End: 2019-07-16

## 2019-08-14 NOTE — ED ADULT NURSE NOTE - PRIMARY CARE PROVIDER
Post-Care Instructions: I reviewed with the patient in detail post-care instructions. Patient should stay away from the sun and wear sun protection until treated areas are fully healed. Unk

## 2019-09-17 ENCOUNTER — APPOINTMENT (OUTPATIENT)
Dept: OBGYN | Facility: CLINIC | Age: 24
End: 2019-09-17
Payer: COMMERCIAL

## 2019-09-17 ENCOUNTER — LABORATORY RESULT (OUTPATIENT)
Age: 24
End: 2019-09-17

## 2019-09-17 VITALS
HEIGHT: 63 IN | SYSTOLIC BLOOD PRESSURE: 106 MMHG | HEART RATE: 105 BPM | BODY MASS INDEX: 25.73 KG/M2 | DIASTOLIC BLOOD PRESSURE: 68 MMHG | WEIGHT: 145.25 LBS

## 2019-09-17 DIAGNOSIS — Z87.42 PERSONAL HISTORY OF OTHER DISEASES OF THE FEMALE GENITAL TRACT: ICD-10-CM

## 2019-09-17 DIAGNOSIS — Z30.09 ENCOUNTER FOR OTHER GENERAL COUNSELING AND ADVICE ON CONTRACEPTION: ICD-10-CM

## 2019-09-17 PROCEDURE — 99395 PREV VISIT EST AGE 18-39: CPT

## 2019-09-17 RX ORDER — NORETHINDRONE ACETATE AND ETHINYL ESTRADIOL AND FERROUS FUMARATE 1.5-30(21)
1.5-3 KIT ORAL DAILY
Qty: 84 | Refills: 3 | Status: DISCONTINUED | COMMUNITY
Start: 2017-12-05 | End: 2019-09-17

## 2019-09-17 RX ORDER — METRONIDAZOLE 7.5 MG/G
0.75 GEL VAGINAL
Qty: 1 | Refills: 0 | Status: DISCONTINUED | COMMUNITY
Start: 2018-11-17 | End: 2019-09-17

## 2019-09-17 NOTE — PHYSICAL EXAM
[Awake] : awake [Alert] : alert [Goiter] : goiter [Soft] : soft [Oriented x3] : oriented to person, place, and time [Normal] : cervix [Discharge] : a  ~M vaginal discharge was present [Foul Smelling] : foul smelling [Moderate] : moderate [Frothy] : frothy [Clear] : clear [Uterine Adnexae] : were not tender and not enlarged [No Bleeding] : there was no active vaginal bleeding [LAD] : no lymphadenopathy [Acute Distress] : no acute distress [Thyroid Nodule] : no thyroid nodule [Mass] : no breast mass [Nipple Discharge] : no nipple discharge [Axillary LAD] : no axillary lymphadenopathy [Tender] : non tender

## 2019-09-17 NOTE — COUNSELING
[Breast Self Exam] : breast self exam [Nutrition] : nutrition [Exercise] : exercise [STD (testing, results, tx)] : STD (testing, results, tx) [Vitamins/Supplements] : vitamins/supplements [HIV Pretest] : HIV pretest [Contraception] : contraception [Safe Sexual Practices] : safe sexual practices

## 2019-09-17 NOTE — HISTORY OF PRESENT ILLNESS
[Sexually Active] : is sexually active [Monogamous] : is monogamous [Contraception] : does not use contraception

## 2019-09-18 LAB
C TRACH RRNA SPEC QL NAA+PROBE: NOT DETECTED
CANDIDA VAG CYTO: NOT DETECTED
G VAGINALIS+PREV SP MTYP VAG QL MICRO: DETECTED
HBV SURFACE AG SER QL: NONREACTIVE
HCV AB SER QL: NONREACTIVE
HCV S/CO RATIO: 0.11 S/CO
HIV1+2 AB SPEC QL IA.RAPID: NONREACTIVE
N GONORRHOEA RRNA SPEC QL NAA+PROBE: NOT DETECTED
SOURCE AMPLIFICATION: NORMAL
T PALLIDUM AB SER QL IA: NEGATIVE
T VAGINALIS VAG QL WET PREP: NOT DETECTED

## 2019-11-05 ENCOUNTER — MEDICATION RENEWAL (OUTPATIENT)
Age: 24
End: 2019-11-05

## 2019-12-03 LAB — CYTOLOGY CVX/VAG DOC THIN PREP: ABNORMAL

## 2019-12-10 ENCOUNTER — APPOINTMENT (OUTPATIENT)
Dept: OBGYN | Facility: CLINIC | Age: 24
End: 2019-12-10
Payer: COMMERCIAL

## 2019-12-10 VITALS
BODY MASS INDEX: 24.63 KG/M2 | SYSTOLIC BLOOD PRESSURE: 116 MMHG | DIASTOLIC BLOOD PRESSURE: 76 MMHG | HEIGHT: 63 IN | HEART RATE: 92 BPM | WEIGHT: 139 LBS

## 2019-12-10 DIAGNOSIS — N89.8 OTHER SPECIFIED NONINFLAMMATORY DISORDERS OF VAGINA: ICD-10-CM

## 2019-12-10 PROCEDURE — 99214 OFFICE O/P EST MOD 30 MIN: CPT

## 2019-12-10 RX ORDER — RANITIDINE 150 MG/1
150 TABLET ORAL
Qty: 1 | Refills: 2 | Status: DISCONTINUED | COMMUNITY
Start: 2019-02-11 | End: 2019-12-10

## 2019-12-10 RX ORDER — CETIRIZINE HYDROCHLORIDE 10 MG/1
10 TABLET, FILM COATED ORAL
Qty: 60 | Refills: 2 | Status: DISCONTINUED | COMMUNITY
Start: 2019-02-11 | End: 2019-12-10

## 2019-12-10 RX ORDER — METRONIDAZOLE 7.5 MG/G
0.75 GEL VAGINAL
Qty: 1 | Refills: 0 | Status: DISCONTINUED | COMMUNITY
Start: 2019-09-17 | End: 2019-12-10

## 2019-12-10 RX ORDER — HYDROXYZINE HYDROCHLORIDE 25 MG/1
25 TABLET ORAL
Qty: 30 | Refills: 2 | Status: DISCONTINUED | COMMUNITY
Start: 2019-02-11 | End: 2019-12-10

## 2019-12-10 RX ORDER — NAPROXEN 500 MG/1
500 TABLET ORAL
Qty: 1 | Refills: 2 | Status: DISCONTINUED | COMMUNITY
Start: 2017-12-13 | End: 2019-12-10

## 2019-12-10 RX ORDER — TRAMADOL HYDROCHLORIDE 50 MG/1
50 TABLET, COATED ORAL
Qty: 14 | Refills: 0 | Status: DISCONTINUED | COMMUNITY
Start: 2018-11-05 | End: 2019-12-10

## 2019-12-10 NOTE — PHYSICAL EXAM
[No Lesions] : no genitalia lesions [Normal] : cervix [Labia Majora] : labia major [Labia Minora] : labia minora [Discharge] : a  ~M vaginal discharge was present [White] : white [Moderate] : moderate [No Bleeding] : there was no active vaginal bleeding [Motion Tenderness] : there was no cervical motion tenderness [Tenderness] : nontender [Adnexa Tenderness] : were not tender

## 2019-12-11 ENCOUNTER — OTHER (OUTPATIENT)
Age: 24
End: 2019-12-11

## 2019-12-17 ENCOUNTER — OTHER (OUTPATIENT)
Age: 24
End: 2019-12-17

## 2019-12-17 LAB
C TRACH RRNA SPEC QL NAA+PROBE: NOT DETECTED
CANDIDA VAG CYTO: NOT DETECTED
G VAGINALIS+PREV SP MTYP VAG QL MICRO: DETECTED
HCG UR QL: NEGATIVE
N GONORRHOEA RRNA SPEC QL NAA+PROBE: NOT DETECTED
QUALITY CONTROL: YES
SOURCE AMPLIFICATION: NORMAL
T VAGINALIS VAG QL WET PREP: NOT DETECTED

## 2020-02-27 ENCOUNTER — APPOINTMENT (OUTPATIENT)
Dept: OBGYN | Facility: CLINIC | Age: 25
End: 2020-02-27
Payer: COMMERCIAL

## 2020-02-27 ENCOUNTER — ASOB RESULT (OUTPATIENT)
Age: 25
End: 2020-02-27

## 2020-02-27 PROCEDURE — 58300 INSERT INTRAUTERINE DEVICE: CPT

## 2020-02-27 PROCEDURE — 76830 TRANSVAGINAL US NON-OB: CPT

## 2020-02-27 RX ORDER — IBUPROFEN 600 MG/1
600 TABLET, FILM COATED ORAL
Qty: 0 | Refills: 0 | Status: COMPLETED | OUTPATIENT
Start: 2020-02-27

## 2020-02-27 RX ADMIN — IBUPROFEN 1 MG: 600 TABLET ORAL at 00:00

## 2020-02-27 RX ADMIN — COPPER 0: 313.4 INTRAUTERINE DEVICE INTRAUTERINE at 00:00

## 2020-02-27 NOTE — PROCEDURE
[IUD Placement] : intrauterine device (IUD) placement [Prevention of Pregnancy] : prevention of pregnancy [Risks] : risks [Benefits] : benefits [Alternatives] : alternatives [Patient] : patient [Infection] : infection [Bleeding] : bleeding [Pain] : pain [Expulsion] : expulsion [Failure] : failure [Uterine Perforation] : uterine perforation [CONSENT OBTAINED] : written consent was obtained prior to the procedure. [Neg Pregnancy Test] : a pregnancy test was negative [Neg GC/Chlamydia] : a a serum GC/Chlamydia was negative [Betadine] : Prepped with Betadine [Uterus Sounded to ___cm] : sounded to [unfilled]Ucm [Tenaculum] : a single toothed tenaculum [Post Placement Transvag. US] : post placement transvaginal ultrasound completed [Easy Passage] : allowed easy passage of a uterine sound without dilation [Lot Number: ___] : IUD lot number: [unfilled] [ParaGard IUD] : The ParaGard IUD was inserted to the fundus of the uterus.  The IUD strings were cut to an appropriate length. [Tolerated Well] : the patient tolerated the procedure well [Pelvic Pain] : the patient complained of pelvic pain [Motrin/Ibuprofen] : Motrin/Ibuprofen

## 2020-02-28 LAB
CANDIDA VAG CYTO: NOT DETECTED
G VAGINALIS+PREV SP MTYP VAG QL MICRO: DETECTED
T VAGINALIS VAG QL WET PREP: NOT DETECTED

## 2020-03-13 ENCOUNTER — APPOINTMENT (OUTPATIENT)
Dept: OBGYN | Facility: CLINIC | Age: 25
End: 2020-03-13
Payer: COMMERCIAL

## 2020-03-13 VITALS
HEIGHT: 63 IN | WEIGHT: 135 LBS | DIASTOLIC BLOOD PRESSURE: 67 MMHG | TEMPERATURE: 98.1 F | BODY MASS INDEX: 23.92 KG/M2 | HEART RATE: 92 BPM | SYSTOLIC BLOOD PRESSURE: 105 MMHG

## 2020-03-13 PROCEDURE — 99213 OFFICE O/P EST LOW 20 MIN: CPT

## 2020-03-13 RX ORDER — NORETHINDRONE ACETATE AND ETHINYL ESTRADIOL AND FERROUS FUMARATE 1.5-30(21)
1.5-3 KIT ORAL
Qty: 3 | Refills: 2 | Status: DISCONTINUED | COMMUNITY
Start: 2019-09-17 | End: 2020-03-13

## 2020-03-13 RX ORDER — METRONIDAZOLE 7.5 MG/G
0.75 GEL VAGINAL
Qty: 1 | Refills: 0 | Status: DISCONTINUED | COMMUNITY
Start: 2019-12-10 | End: 2020-03-13

## 2020-03-13 NOTE — PHYSICAL EXAM
[No Lesions] : no genitalia lesions [Normal] : cervix [Labia Majora] : labia major [Labia Minora] : labia minora [Discharge] : a  ~M vaginal discharge was present [Scant] : scant [White] : white [Thick] : thick [No Bleeding] : there was no active vaginal bleeding [IUD String] : had an IUD string protruding out

## 2020-08-11 ENCOUNTER — APPOINTMENT (OUTPATIENT)
Dept: INTERNAL MEDICINE | Facility: CLINIC | Age: 25
End: 2020-08-11

## 2020-09-15 ENCOUNTER — APPOINTMENT (OUTPATIENT)
Dept: OBGYN | Facility: CLINIC | Age: 25
End: 2020-09-15
Payer: COMMERCIAL

## 2020-09-15 VITALS
WEIGHT: 124 LBS | BODY MASS INDEX: 21.97 KG/M2 | SYSTOLIC BLOOD PRESSURE: 111 MMHG | TEMPERATURE: 98.4 F | HEART RATE: 103 BPM | DIASTOLIC BLOOD PRESSURE: 67 MMHG | HEIGHT: 63 IN

## 2020-09-15 PROCEDURE — 99213 OFFICE O/P EST LOW 20 MIN: CPT

## 2020-09-15 RX ORDER — METRONIDAZOLE 500 MG/1
500 TABLET ORAL TWICE DAILY
Qty: 14 | Refills: 0 | Status: DISCONTINUED | COMMUNITY
Start: 2020-03-13 | End: 2020-09-15

## 2020-09-16 NOTE — PHYSICAL EXAM
[Labia Majora] : normal [Labia Minora] : normal [Scant] : scant [Discharge] : a  ~M vaginal discharge was present [White] : white [Thin] : thin [No Bleeding] : There was no active vaginal bleeding [Normal] : normal

## 2020-11-05 ENCOUNTER — LABORATORY RESULT (OUTPATIENT)
Age: 25
End: 2020-11-05

## 2020-11-05 ENCOUNTER — APPOINTMENT (OUTPATIENT)
Dept: INTERNAL MEDICINE | Facility: CLINIC | Age: 25
End: 2020-11-05
Payer: COMMERCIAL

## 2020-11-05 VITALS
TEMPERATURE: 98.2 F | SYSTOLIC BLOOD PRESSURE: 100 MMHG | RESPIRATION RATE: 12 BRPM | BODY MASS INDEX: 22.32 KG/M2 | WEIGHT: 126 LBS | DIASTOLIC BLOOD PRESSURE: 68 MMHG | HEART RATE: 89 BPM | OXYGEN SATURATION: 97 % | HEIGHT: 63 IN

## 2020-11-05 DIAGNOSIS — Z00.00 ENCOUNTER FOR GENERAL ADULT MEDICAL EXAMINATION W/OUT ABNORMAL FINDINGS: ICD-10-CM

## 2020-11-05 PROCEDURE — 99385 PREV VISIT NEW AGE 18-39: CPT

## 2020-11-05 PROCEDURE — 99072 ADDL SUPL MATRL&STAF TM PHE: CPT

## 2020-11-05 NOTE — ASSESSMENT
[FreeTextEntry1] : Physical\par blood work ordered\par declines flu vaccine\par \par follow up in one week for lab results\par

## 2020-11-05 NOTE — HISTORY OF PRESENT ILLNESS
[de-identified] : Ms. MATTHIAS YEE is a 25 year old female, with hx of anxiety,  presents for annual physical\par She is doing well. She sees a psychologist weekly.\par Denies SOB, chest pain, abdominal pain, N/V/D, leg swelling, headache, dizziness \par \par

## 2020-11-05 NOTE — HEALTH RISK ASSESSMENT
[No] : No [Single] : single [Sexually Active] : sexually active [] : No [PapSmearDate] : Feb 2020 [de-identified] : with parents [FreeTextEntry2] :

## 2020-11-10 LAB
25(OH)D3 SERPL-MCNC: 30.6 NG/ML
ALBUMIN SERPL ELPH-MCNC: 5 G/DL
ALP BLD-CCNC: 51 U/L
ALT SERPL-CCNC: 11 U/L
ANION GAP SERPL CALC-SCNC: 12 MMOL/L
AST SERPL-CCNC: 17 U/L
BASOPHILS # BLD AUTO: 0.05 K/UL
BASOPHILS NFR BLD AUTO: 1 %
BILIRUB SERPL-MCNC: 0.9 MG/DL
BUN SERPL-MCNC: 13 MG/DL
CALCIUM SERPL-MCNC: 10 MG/DL
CHLORIDE SERPL-SCNC: 104 MMOL/L
CHOLEST SERPL-MCNC: 185 MG/DL
CO2 SERPL-SCNC: 27 MMOL/L
CREAT SERPL-MCNC: 0.67 MG/DL
EOSINOPHIL # BLD AUTO: 0.16 K/UL
EOSINOPHIL NFR BLD AUTO: 3.3 %
ESTIMATED AVERAGE GLUCOSE: 85 MG/DL
GLUCOSE SERPL-MCNC: 70 MG/DL
HBA1C MFR BLD HPLC: 4.6 %
HCT VFR BLD CALC: 44.3 %
HDLC SERPL-MCNC: 61 MG/DL
HGB BLD-MCNC: 14.2 G/DL
IMM GRANULOCYTES NFR BLD AUTO: 0.2 %
LDLC SERPL CALC-MCNC: 112 MG/DL
LYMPHOCYTES # BLD AUTO: 1.15 K/UL
LYMPHOCYTES NFR BLD AUTO: 23.8 %
MAN DIFF?: NORMAL
MCHC RBC-ENTMCNC: 30.7 PG
MCHC RBC-ENTMCNC: 32.1 GM/DL
MCV RBC AUTO: 95.7 FL
MONOCYTES # BLD AUTO: 0.24 K/UL
MONOCYTES NFR BLD AUTO: 5 %
NEUTROPHILS # BLD AUTO: 3.22 K/UL
NEUTROPHILS NFR BLD AUTO: 66.7 %
NONHDLC SERPL-MCNC: 125 MG/DL
PLATELET # BLD AUTO: 232 K/UL
POTASSIUM SERPL-SCNC: 5.1 MMOL/L
PROT SERPL-MCNC: 7 G/DL
RBC # BLD: 4.63 M/UL
RBC # FLD: 13.2 %
SODIUM SERPL-SCNC: 143 MMOL/L
TRIGL SERPL-MCNC: 63 MG/DL
TSH SERPL-ACNC: 0.79 UIU/ML
VIT B12 SERPL-MCNC: 598 PG/ML
WBC # FLD AUTO: 4.83 K/UL

## 2020-11-19 LAB
APPEARANCE: ABNORMAL
BILIRUBIN URINE: NEGATIVE
BLOOD URINE: NEGATIVE
COLOR: YELLOW
GLUCOSE QUALITATIVE U: NEGATIVE
KETONES URINE: NEGATIVE
LEUKOCYTE ESTERASE URINE: NEGATIVE
NITRITE URINE: NEGATIVE
PH URINE: 6
PROTEIN URINE: NORMAL
SARS-COV-2 IGG SERPL IA-ACNC: 0.09 INDEX
SARS-COV-2 IGG SERPL QL IA: NEGATIVE
SPECIFIC GRAVITY URINE: 1.02
UROBILINOGEN URINE: NORMAL

## 2020-12-16 PROBLEM — N76.0 BACTERIAL VAGINOSIS: Status: RESOLVED | Noted: 2018-11-17 | Resolved: 2020-12-16

## 2021-03-13 ENCOUNTER — EMERGENCY (EMERGENCY)
Facility: HOSPITAL | Age: 26
LOS: 1 days | Discharge: HOME | End: 2021-03-13
Admitting: EMERGENCY MEDICINE
Payer: COMMERCIAL

## 2021-03-13 VITALS
DIASTOLIC BLOOD PRESSURE: 82 MMHG | OXYGEN SATURATION: 100 % | SYSTOLIC BLOOD PRESSURE: 113 MMHG | RESPIRATION RATE: 15 BRPM | HEART RATE: 117 BPM | TEMPERATURE: 97 F

## 2021-03-13 VITALS
OXYGEN SATURATION: 100 % | HEIGHT: 63.07 IN | TEMPERATURE: 98 F | RESPIRATION RATE: 16 BRPM | DIASTOLIC BLOOD PRESSURE: 91 MMHG | HEART RATE: 109 BPM | SYSTOLIC BLOOD PRESSURE: 119 MMHG

## 2021-03-13 DIAGNOSIS — F41.9 ANXIETY DISORDER, UNSPECIFIED: ICD-10-CM

## 2021-03-13 DIAGNOSIS — F60.9 PERSONALITY DISORDER, UNSPECIFIED: ICD-10-CM

## 2021-03-13 DIAGNOSIS — Z98.890 OTHER SPECIFIED POSTPROCEDURAL STATES: Chronic | ICD-10-CM

## 2021-03-13 PROCEDURE — 99284 EMERGENCY DEPT VISIT MOD MDM: CPT

## 2021-03-13 PROCEDURE — 90792 PSYCH DIAG EVAL W/MED SRVCS: CPT

## 2021-03-13 RX ORDER — OLANZAPINE 15 MG/1
2.5 TABLET, FILM COATED ORAL ONCE
Refills: 0 | Status: COMPLETED | OUTPATIENT
Start: 2021-03-13 | End: 2021-03-13

## 2021-03-13 RX ORDER — HYDROXYZINE HCL 10 MG
25 TABLET ORAL ONCE
Refills: 0 | Status: COMPLETED | OUTPATIENT
Start: 2021-03-13 | End: 2021-03-13

## 2021-03-13 RX ADMIN — Medication 25 MILLIGRAM(S): at 17:55

## 2021-03-13 RX ADMIN — Medication 2 MILLIGRAM(S): at 17:02

## 2021-03-13 RX ADMIN — OLANZAPINE 2.5 MILLIGRAM(S): 15 TABLET, FILM COATED ORAL at 20:25

## 2021-03-13 NOTE — ED PROVIDER NOTE - PATIENT PORTAL LINK FT
You can access the FollowMyHealth Patient Portal offered by [facility] by registering at the following website: [email or web address]. By joining [employer]’s FollowMyHealth portal, you will also be able to view your health information using other applications (apps) compatible with our system.

## 2021-03-13 NOTE — ED ADULT NURSE REASSESSMENT NOTE - NS ED NURSE REASSESS COMMENT FT1
Pt cleared for d/c by NP. Pt calm and cooperative, reported a relief from anxiety. Pt's mother arrived for pickup, pt received d/c instructions.

## 2021-03-13 NOTE — ED ADULT NURSE NOTE - NSIMPLEMENTINTERV_GEN_ALL_ED
Implemented All Universal Safety Interventions:  [place] to call system. Call bell, personal items and telephone within reach. Instruct patient to call for assistance. Room bathroom lighting operational. Non-slip footwear when patient is off stretcher. Physically safe environment: no spills, clutter or unnecessary equipment. Stretcher in lowest position, wheels locked, appropriate side rails in place.

## 2021-03-13 NOTE — ED PROVIDER NOTE - CLINICAL SUMMARY MEDICAL DECISION MAKING FREE TEXT BOX
24 y/o F hx Anxiety D/O    Medication offered. Medical evaluation performed. There is no clinical evidence of intoxication or any acute medical problem requiring immediate intervention.  Recommend following up with [facility].

## 2021-03-13 NOTE — ED ADULT NURSE NOTE - OBJECTIVE STATEMENT
Pt arrived to  reporting panic attack. Pt visibly anxious and tearful during interview. Pt medicated as ordered with 2 ativan IM. Pt changed into  clothing. Personal property collected and logged.

## 2021-03-13 NOTE — ED BEHAVIORAL HEALTH ASSESSMENT NOTE - OTHER PAST PSYCHIATRIC HISTORY (INCLUDE DETAILS REGARDING ONSET, COURSE OF ILLNESS, INPATIENT/OUTPATIENT TREATMENT)
- last saw her therapist two months ago  - reports 6 previous psychiatrists, the last she saw two years ago

## 2021-03-13 NOTE — ED BEHAVIORAL HEALTH ASSESSMENT NOTE - CASE SUMMARY
Patient was also examined by Attending Psychiatrist. Attending wrote HPI and formulation so not repeating it here

## 2021-03-13 NOTE — ED BEHAVIORAL HEALTH ASSESSMENT NOTE - RISK ASSESSMENT
patient is at low acute risk for suicide. patient denied history of suicidal behavior. chronic risk factors include anxiety disorder, marijuana use, borderline traits including help-rejecting behavior. protective factors which mitigate risk include no reported history of suicidal behavior, supportive family, seeking treatment, future oriented. Low Acute Suicide Risk

## 2021-03-13 NOTE — ED BEHAVIORAL HEALTH ASSESSMENT NOTE - DETAILS
denies ED staff related plan to mother n/a ambien - morning sedation; reports multiple trials of ssris and benzos have not helped with severe anxiety no acute safety concerns at this time

## 2021-03-13 NOTE — ED BEHAVIORAL HEALTH ASSESSMENT NOTE - HPI (INCLUDE ILLNESS QUALITY, SEVERITY, DURATION, TIMING, CONTEXT, MODIFYING FACTORS, ASSOCIATED SIGNS AND SYMPTOMS)
25 year old female with history of anxiety and marijuana use BIB mother for distressing anxiety.  Patient is tearful on interview, complaining she is not getting help she needs. As per ED staff, she reported a relationship issue with a boyfriend as a possible stressor. She perseverates in the interview regarding the "poor treatment" she was receiving and evades questions about her social history.  Patient reports that "nothing is helping" with her anxiety, including the Ativan 2 mg IM she received in the ED. She reports that only marijuana has been somewhat helpful. Patient relates that she has not seen her psychologist in two or three months but states that what she really needs is "medical help". She reports trying six different psychiatrists, the last she saw two years ago, and nothing has worked including the Trazodone, Zoloft, and Xanax which she was previously prescribed. She also reports poor sleep due to anxiety and that Ambien use led to morning sedation. Patient denied any history of SI/HI or AH/VH. Denied history of aggression or other illicit drug use. She agreed to a one time dose of an alternate anti-anxiety agent such as Zyprexa with plan for urgent care follow up. Patient was advised to follow up with adult urgent care clinic on Monday to re-establish outpatient care. 25 year old female with history of anxiety and marijuana use BIB mother for distressing anxiety.  Patient is tearful, dramatic on interview - able to stop/start crying, at times looking at staff while complaining about them. Patient is devaluing, critical, help seeking and rejecting at the same time, at times yelling "how am I supposed to know what I need??/ You are the doctor!!!" Patient reported that a relationship issue with a boyfriend as a possible stressor. She perseverates in the interview regarding the "poor treatment" she was receiving in the ED (ie. questioning why there is security, PES staff and why are they looking at their phones at times; why she has to be wearing a hospital gown, why is she with psychiatric patients etc). Patient reports that "nothing is helping" with her anxiety, including the Ativan 2 mg IM she received in the ED and lists several medications she has tried in the last few years. She reports nothing helped except marijuana which she smokes regularly, usually at night and helps her calm down and sleep. Tried CBD oil - not helpful. never tried medically prescribed marijuana. Patient relates that she has not seen her psychologist in two or three months but states that what she really needs is "medical help". She reports trying six different psychiatrists, no one was able to help her. She last a psychiatrist two years ago, and nothing has worked including the Trazodone, Zoloft, and Xanax which she was previously prescribed. She also reports poor sleep due to anxiety and that Ambien use led to morning sedation. Patient denied any history of SI/HI or AH/VH. Denied history of aggression or other illicit drug use. She agreed to a one time dose of an alternate anti-anxiety agent such as Zyprexa with plan for urgent care follow up. Patient was advised to follow up with adult urgent care clinic on Monday to re-establish outpatient care.

## 2021-03-13 NOTE — ED BEHAVIORAL HEALTH ASSESSMENT NOTE - SUMMARY
25 year old female with history of anxiety and marijuana use BIB mother for distressing anxiety.  Patient is superficially cooperative and does not provide extensive history, perseverating on request for "medical help" for her anxiety. However patient explicitly denies SI/HI or thoughts of self-harm/aggression when asked by multiple providers.    At this time patient is not acutely suicidal or homicidal, not evidencing severe or worsening depression, not acutely manic or psychotic, denies any thoughts of self-harm, aggression, or history of suicidal/homicidal behavior. Patient's anxiety disorder, though distressing to her and family and possibly worsened since not engaging with care, is not an indication for inpatient treatment. Patient does not represent an imminent threat to self or others at this time and she is appropriate for outpatient follow up. Patient was provided with one time doses of Ativan and Zyprexa in the ED to address acute anxiety. Referral for adult urgent care clinic was provided and recommendations for re-engagement with psychiatric and therapy treatment was impressed upon her. 26 yo female with strong Borderline character traits, suspected under reporting of extent of Cannabis use, cannot rule out other substance use, with numerous self-reported medications trials (unknown if pt was compliant, had full trials at effective doses) with hep rejecting, splitting, devaluing ED presentation. Patient offered voluntary admission which she declined; she declined all offers of different modalities of help. No grounds for involuntary admission.  - would be most helped by intense DBT

## 2021-03-13 NOTE — ED PROVIDER NOTE - OBJECTIVE STATEMENT
24 y/o F hx Anxiety D/O  BIB mother secondary to increase anxiousness. Patient  presents tearful and hyperventilation. Admits to a relationship  issue between self and boyfriend.  Explicitly  denies SI/HI/AH/VH. Denies falling, punching or kicking any objects . Denies pain, SOB, fever, chills ,chest /abdominal discomfort.  Denies  recent use of  alcohol  or illicit drugs .   No evidence of physical injuries, broken skin or deformities.

## 2021-03-13 NOTE — ED BEHAVIORAL HEALTH ASSESSMENT NOTE - DESCRIPTION
anxious and frustrated, requiring medications to ease anxiety, but otherwise in behavioral control    Vital Signs Last 24 Hrs  T(C): 36.2 (13 Mar 2021 18:56), Max: 36.9 (13 Mar 2021 16:40)  T(F): 97.2 (13 Mar 2021 18:56), Max: 98.4 (13 Mar 2021 16:40)  HR: 117 (13 Mar 2021 18:56) (109 - 117)  BP: 113/82 (13 Mar 2021 18:56) (113/82 - 119/91)  BP(mean): --  RR: 15 (13 Mar 2021 18:56) (15 - 16)  SpO2: 100% (13 Mar 2021 18:56) (100% - 100%) spondylolisthesis of lumbosacral region provided limited history

## 2021-03-13 NOTE — ED ADULT TRIAGE NOTE - CHIEF COMPLAINT QUOTE
patient arrives hysterically crying in triage, on cell phone arguing with another person. States " I don't want to feel like this anymore." Patient endorses thoughts of SI with no plan. States has Hx SI. Denies any alcohol or drug use today. Hx depression & Anxiety

## 2021-03-13 NOTE — ED ADULT NURSE NOTE - PAIN RATING/NUMBER SCALE (0-10): REST
Anesthesia Type: 1% lidocaine with epinephrine Bill 07297 For Specimen Handling/Conveyance To Laboratory?: no Additional Anesthesia Volume In Cc (Will Not Render If 0): 0 Curettage Text: The wound bed was treated with curettage after the biopsy was performed. Post-Care Instructions: I reviewed with the patient in detail post-care instructions. Patient is to keep the biopsy site dry overnight, and then apply bacitracin twice daily until healed. Patient may apply hydrogen peroxide soaks to remove any crusting. Silver Nitrate Text: The wound bed was treated with silver nitrate after the biopsy was performed. Billing Type: Third-Party Bill Wound Care: Petrolatum Lab: 6 Depth Of Biopsy: dermis Cryotherapy Text: The wound bed was treated with cryotherapy after the biopsy was performed. Anesthesia Volume In Cc (Will Not Render If 0): 0.5 Consent: Written consent was obtained and risks were reviewed including but not limited to scarring, infection, bleeding, scabbing, incomplete removal, nerve damage and allergy to anesthesia. Notification Instructions: Patient will be notified of biopsy results. However, patient instructed to call the office if not contacted within 2 weeks. Biopsy Type: H and E 0 Type Of Destruction Used: Curettage Lab Facility: 3 Electrodesiccation Text: The wound bed was treated with electrodesiccation after the biopsy was performed. Was A Bandage Applied: Yes Detail Level: Detailed Hemostasis: Aluminum Chloride Electrodesiccation And Curettage Text: The wound bed was treated with electrodesiccation and curettage after the biopsy was performed. Biopsy Method: Personna blade Dressing: bandage

## 2021-04-28 ENCOUNTER — APPOINTMENT (OUTPATIENT)
Dept: INTERNAL MEDICINE | Facility: CLINIC | Age: 26
End: 2021-04-28
Payer: COMMERCIAL

## 2021-04-28 VITALS
RESPIRATION RATE: 12 BRPM | HEART RATE: 103 BPM | OXYGEN SATURATION: 97 % | WEIGHT: 132 LBS | BODY MASS INDEX: 23.39 KG/M2 | TEMPERATURE: 99.3 F | SYSTOLIC BLOOD PRESSURE: 96 MMHG | DIASTOLIC BLOOD PRESSURE: 62 MMHG | HEIGHT: 63 IN

## 2021-04-28 PROCEDURE — 99214 OFFICE O/P EST MOD 30 MIN: CPT

## 2021-04-28 PROCEDURE — XXXXX: CPT

## 2021-04-28 RX ORDER — METRONIDAZOLE 500 MG/1
500 TABLET ORAL TWICE DAILY
Qty: 14 | Refills: 0 | Status: DISCONTINUED | COMMUNITY
Start: 2020-09-22 | End: 2021-04-28

## 2021-05-04 ENCOUNTER — APPOINTMENT (OUTPATIENT)
Dept: INTERNAL MEDICINE | Facility: CLINIC | Age: 26
End: 2021-05-04

## 2021-05-10 LAB
25(OH)D3 SERPL-MCNC: 33.2 NG/ML
ALBUMIN SERPL ELPH-MCNC: 4.9 G/DL
ALP BLD-CCNC: 55 U/L
ALT SERPL-CCNC: 14 U/L
ANION GAP SERPL CALC-SCNC: 14 MMOL/L
APTT BLD: 30 SEC
AST SERPL-CCNC: 16 U/L
BASOPHILS # BLD AUTO: 0.05 K/UL
BASOPHILS NFR BLD AUTO: 0.8 %
BILIRUB SERPL-MCNC: 0.4 MG/DL
BUN SERPL-MCNC: 17 MG/DL
CALCIUM SERPL-MCNC: 9.8 MG/DL
CHLORIDE SERPL-SCNC: 105 MMOL/L
CO2 SERPL-SCNC: 24 MMOL/L
CREAT SERPL-MCNC: 0.8 MG/DL
EOSINOPHIL # BLD AUTO: 0.14 K/UL
EOSINOPHIL NFR BLD AUTO: 2.2 %
GLUCOSE SERPL-MCNC: 63 MG/DL
HCT VFR BLD CALC: 42.2 %
HGB BLD-MCNC: 13.4 G/DL
IMM GRANULOCYTES NFR BLD AUTO: 0.2 %
INR PPP: 0.91 RATIO
LYMPHOCYTES # BLD AUTO: 1.3 K/UL
LYMPHOCYTES NFR BLD AUTO: 20.5 %
MAN DIFF?: NORMAL
MCHC RBC-ENTMCNC: 30.7 PG
MCHC RBC-ENTMCNC: 31.8 GM/DL
MCV RBC AUTO: 96.6 FL
MONOCYTES # BLD AUTO: 0.43 K/UL
MONOCYTES NFR BLD AUTO: 6.8 %
NEUTROPHILS # BLD AUTO: 4.41 K/UL
NEUTROPHILS NFR BLD AUTO: 69.5 %
PLATELET # BLD AUTO: 264 K/UL
POTASSIUM SERPL-SCNC: 4.3 MMOL/L
PROT SERPL-MCNC: 7 G/DL
PT BLD: 10.9 SEC
RBC # BLD: 4.37 M/UL
RBC # FLD: 13.6 %
SODIUM SERPL-SCNC: 143 MMOL/L
TSH SERPL-ACNC: 0.68 UIU/ML
VIT B12 SERPL-MCNC: 579 PG/ML
WBC # FLD AUTO: 6.34 K/UL

## 2021-05-12 ENCOUNTER — NON-APPOINTMENT (OUTPATIENT)
Age: 26
End: 2021-05-12

## 2021-05-13 ENCOUNTER — NON-APPOINTMENT (OUTPATIENT)
Age: 26
End: 2021-05-13

## 2021-05-13 ENCOUNTER — APPOINTMENT (OUTPATIENT)
Dept: OBGYN | Facility: CLINIC | Age: 26
End: 2021-05-13
Payer: COMMERCIAL

## 2021-05-13 VITALS
WEIGHT: 130 LBS | TEMPERATURE: 97.6 F | SYSTOLIC BLOOD PRESSURE: 108 MMHG | BODY MASS INDEX: 23.04 KG/M2 | HEART RATE: 114 BPM | HEIGHT: 63 IN | DIASTOLIC BLOOD PRESSURE: 69 MMHG

## 2021-05-13 DIAGNOSIS — Z83.79 FAMILY HISTORY OF OTHER DISEASES OF THE DIGESTIVE SYSTEM: ICD-10-CM

## 2021-05-13 DIAGNOSIS — L50.9 URTICARIA, UNSPECIFIED: ICD-10-CM

## 2021-05-13 DIAGNOSIS — Z01.419 ENCOUNTER FOR GYNECOLOGICAL EXAMINATION (GENERAL) (ROUTINE) W/OUT ABNORMAL FINDINGS: ICD-10-CM

## 2021-05-13 PROCEDURE — 99395 PREV VISIT EST AGE 18-39: CPT

## 2021-05-13 PROCEDURE — 99072 ADDL SUPL MATRL&STAF TM PHE: CPT

## 2021-05-13 PROCEDURE — 99213 OFFICE O/P EST LOW 20 MIN: CPT | Mod: 25

## 2021-05-13 PROCEDURE — 58301 REMOVE INTRAUTERINE DEVICE: CPT

## 2021-05-13 RX ORDER — CHROMIUM 200 MCG
TABLET ORAL
Refills: 0 | Status: ACTIVE | COMMUNITY

## 2021-05-13 NOTE — PHYSICAL EXAM
[Appropriately responsive] : appropriately responsive [Alert] : alert [No Acute Distress] : no acute distress [Soft] : soft [Non-tender] : non-tender [No Lesions] : no lesions [Oriented x3] : oriented x3 [Examination Of The Breasts] : a normal appearance [No Masses] : no breast masses were palpable [Labia Majora] : normal [Labia Minora] : normal [No Bleeding] : There was no active vaginal bleeding [Normal] : normal [Uterine Adnexae] : non-palpable [Tenderness] : nontender

## 2021-05-13 NOTE — PROCEDURE
[Cervical Pap Smear] : cervical Pap smear [Liquid Base] : liquid base [No Complications] : there were no complications [IUD Removal] : intrauterine device (IUD) removal [Patient] : patient [Speculum Placed] : speculum placed [IUD Removed - Forceps] : IUD removed - forceps [IUD Discarded] : IUD discarded [Tolerated Well] : Patient tolerated the procedure well [de-identified] : vaginal odor

## 2021-05-14 LAB
C TRACH RRNA SPEC QL NAA+PROBE: NOT DETECTED
N GONORRHOEA RRNA SPEC QL NAA+PROBE: NOT DETECTED
SOURCE AMPLIFICATION: NORMAL

## 2021-05-19 LAB
CANDIDA VAG CYTO: NOT DETECTED
CYTOLOGY CVX/VAG DOC THIN PREP: NORMAL
G VAGINALIS+PREV SP MTYP VAG QL MICRO: DETECTED
T VAGINALIS VAG QL WET PREP: NOT DETECTED

## 2021-06-30 ENCOUNTER — EMERGENCY (EMERGENCY)
Facility: HOSPITAL | Age: 26
LOS: 1 days | Discharge: HOME | End: 2021-06-30
Attending: EMERGENCY MEDICINE | Admitting: EMERGENCY MEDICINE
Payer: COMMERCIAL

## 2021-06-30 VITALS
SYSTOLIC BLOOD PRESSURE: 114 MMHG | HEIGHT: 63.07 IN | DIASTOLIC BLOOD PRESSURE: 78 MMHG | RESPIRATION RATE: 18 BRPM | TEMPERATURE: 98 F | HEART RATE: 91 BPM

## 2021-06-30 VITALS
HEART RATE: 67 BPM | RESPIRATION RATE: 17 BRPM | TEMPERATURE: 98 F | SYSTOLIC BLOOD PRESSURE: 99 MMHG | OXYGEN SATURATION: 100 % | DIASTOLIC BLOOD PRESSURE: 62 MMHG

## 2021-06-30 DIAGNOSIS — Z98.890 OTHER SPECIFIED POSTPROCEDURAL STATES: Chronic | ICD-10-CM

## 2021-06-30 LAB
ALBUMIN SERPL ELPH-MCNC: 4.7 G/DL — SIGNIFICANT CHANGE UP (ref 3.3–5)
ALP SERPL-CCNC: 53 U/L — SIGNIFICANT CHANGE UP (ref 40–120)
ALT FLD-CCNC: 9 U/L — SIGNIFICANT CHANGE UP (ref 4–33)
ANION GAP SERPL CALC-SCNC: 16 MMOL/L — HIGH (ref 7–14)
APPEARANCE UR: CLEAR — SIGNIFICANT CHANGE UP
AST SERPL-CCNC: 13 U/L — SIGNIFICANT CHANGE UP (ref 4–32)
B BURGDOR C6 AB SER-ACNC: NEGATIVE — SIGNIFICANT CHANGE UP
B BURGDOR IGG+IGM SER-ACNC: 0.12 INDEX — SIGNIFICANT CHANGE UP (ref 0.01–0.89)
B PERT DNA SPEC QL NAA+PROBE: SIGNIFICANT CHANGE UP
BACTERIA # UR AUTO: ABNORMAL
BILIRUB SERPL-MCNC: 0.6 MG/DL — SIGNIFICANT CHANGE UP (ref 0.2–1.2)
BILIRUB UR-MCNC: NEGATIVE — SIGNIFICANT CHANGE UP
BUN SERPL-MCNC: 19 MG/DL — SIGNIFICANT CHANGE UP (ref 7–23)
C PNEUM DNA SPEC QL NAA+PROBE: SIGNIFICANT CHANGE UP
CALCIUM SERPL-MCNC: 9.6 MG/DL — SIGNIFICANT CHANGE UP (ref 8.4–10.5)
CHLORIDE SERPL-SCNC: 101 MMOL/L — SIGNIFICANT CHANGE UP (ref 98–107)
CO2 SERPL-SCNC: 22 MMOL/L — SIGNIFICANT CHANGE UP (ref 22–31)
COLOR SPEC: YELLOW — SIGNIFICANT CHANGE UP
CREAT SERPL-MCNC: 0.72 MG/DL — SIGNIFICANT CHANGE UP (ref 0.5–1.3)
DIFF PNL FLD: NEGATIVE — SIGNIFICANT CHANGE UP
EPI CELLS # UR: 11 /HPF — HIGH (ref 0–5)
FLUAV SUBTYP SPEC NAA+PROBE: SIGNIFICANT CHANGE UP
FLUBV RNA SPEC QL NAA+PROBE: SIGNIFICANT CHANGE UP
GLUCOSE SERPL-MCNC: 88 MG/DL — SIGNIFICANT CHANGE UP (ref 70–99)
GLUCOSE UR QL: NEGATIVE — SIGNIFICANT CHANGE UP
HADV DNA SPEC QL NAA+PROBE: SIGNIFICANT CHANGE UP
HCG SERPL-ACNC: <5 MIU/ML — SIGNIFICANT CHANGE UP
HCOV 229E RNA SPEC QL NAA+PROBE: SIGNIFICANT CHANGE UP
HCOV HKU1 RNA SPEC QL NAA+PROBE: SIGNIFICANT CHANGE UP
HCOV NL63 RNA SPEC QL NAA+PROBE: SIGNIFICANT CHANGE UP
HCOV OC43 RNA SPEC QL NAA+PROBE: SIGNIFICANT CHANGE UP
HCT VFR BLD CALC: 40.4 % — SIGNIFICANT CHANGE UP (ref 34.5–45)
HGB BLD-MCNC: 13.6 G/DL — SIGNIFICANT CHANGE UP (ref 11.5–15.5)
HMPV RNA SPEC QL NAA+PROBE: SIGNIFICANT CHANGE UP
HPIV1 RNA SPEC QL NAA+PROBE: SIGNIFICANT CHANGE UP
HPIV2 RNA SPEC QL NAA+PROBE: SIGNIFICANT CHANGE UP
HPIV3 RNA SPEC QL NAA+PROBE: SIGNIFICANT CHANGE UP
HPIV4 RNA SPEC QL NAA+PROBE: SIGNIFICANT CHANGE UP
HYALINE CASTS # UR AUTO: 4 /LPF — SIGNIFICANT CHANGE UP (ref 0–7)
KETONES UR-MCNC: NEGATIVE — SIGNIFICANT CHANGE UP
LEUKOCYTE ESTERASE UR-ACNC: NEGATIVE — SIGNIFICANT CHANGE UP
MCHC RBC-ENTMCNC: 30.4 PG — SIGNIFICANT CHANGE UP (ref 27–34)
MCHC RBC-ENTMCNC: 33.7 GM/DL — SIGNIFICANT CHANGE UP (ref 32–36)
MCV RBC AUTO: 90.4 FL — SIGNIFICANT CHANGE UP (ref 80–100)
NITRITE UR-MCNC: NEGATIVE — SIGNIFICANT CHANGE UP
NRBC # BLD: 0 /100 WBCS — SIGNIFICANT CHANGE UP
NRBC # FLD: 0 K/UL — SIGNIFICANT CHANGE UP
PH UR: 6 — SIGNIFICANT CHANGE UP (ref 5–8)
PLATELET # BLD AUTO: 246 K/UL — SIGNIFICANT CHANGE UP (ref 150–400)
POTASSIUM SERPL-MCNC: 3.8 MMOL/L — SIGNIFICANT CHANGE UP (ref 3.5–5.3)
POTASSIUM SERPL-SCNC: 3.8 MMOL/L — SIGNIFICANT CHANGE UP (ref 3.5–5.3)
PROT SERPL-MCNC: 7 G/DL — SIGNIFICANT CHANGE UP (ref 6–8.3)
PROT UR-MCNC: ABNORMAL
RAPID RVP RESULT: SIGNIFICANT CHANGE UP
RBC # BLD: 4.47 M/UL — SIGNIFICANT CHANGE UP (ref 3.8–5.2)
RBC # FLD: 12.6 % — SIGNIFICANT CHANGE UP (ref 10.3–14.5)
RBC CASTS # UR COMP ASSIST: 1 /HPF — SIGNIFICANT CHANGE UP (ref 0–4)
RSV RNA SPEC QL NAA+PROBE: SIGNIFICANT CHANGE UP
RV+EV RNA SPEC QL NAA+PROBE: SIGNIFICANT CHANGE UP
SARS-COV-2 RNA SPEC QL NAA+PROBE: SIGNIFICANT CHANGE UP
SODIUM SERPL-SCNC: 139 MMOL/L — SIGNIFICANT CHANGE UP (ref 135–145)
SP GR SPEC: 1.03 — HIGH (ref 1.01–1.02)
T PALLIDUM AB TITR SER: NEGATIVE — SIGNIFICANT CHANGE UP
UROBILINOGEN FLD QL: SIGNIFICANT CHANGE UP
WBC # BLD: 9.97 K/UL — SIGNIFICANT CHANGE UP (ref 3.8–10.5)
WBC # FLD AUTO: 9.97 K/UL — SIGNIFICANT CHANGE UP (ref 3.8–10.5)
WBC UR QL: 6 /HPF — HIGH (ref 0–5)

## 2021-06-30 PROCEDURE — 99284 EMERGENCY DEPT VISIT MOD MDM: CPT | Mod: GC

## 2021-06-30 PROCEDURE — 99284 EMERGENCY DEPT VISIT MOD MDM: CPT

## 2021-06-30 RX ORDER — LORATADINE 10 MG/1
1 TABLET ORAL
Qty: 30 | Refills: 0
Start: 2021-06-30 | End: 2021-07-29

## 2021-06-30 RX ORDER — EPINEPHRINE 0.3 MG/.3ML
0.3 INJECTION INTRAMUSCULAR; SUBCUTANEOUS
Qty: 1 | Refills: 0
Start: 2021-06-30 | End: 2021-06-30

## 2021-06-30 RX ORDER — SODIUM CHLORIDE 9 MG/ML
1000 INJECTION INTRAMUSCULAR; INTRAVENOUS; SUBCUTANEOUS
Refills: 0 | Status: DISCONTINUED | OUTPATIENT
Start: 2021-06-30 | End: 2021-07-03

## 2021-06-30 RX ORDER — DIPHENHYDRAMINE HCL 50 MG
50 CAPSULE ORAL ONCE
Refills: 0 | Status: COMPLETED | OUTPATIENT
Start: 2021-06-30 | End: 2021-06-30

## 2021-06-30 RX ORDER — DIPHENHYDRAMINE HCL 50 MG
25 CAPSULE ORAL EVERY 8 HOURS
Refills: 0 | Status: DISCONTINUED | OUTPATIENT
Start: 2021-06-30 | End: 2021-07-03

## 2021-06-30 RX ORDER — DIPHENHYDRAMINE HCL 50 MG
50 CAPSULE ORAL ONCE
Refills: 0 | Status: DISCONTINUED | OUTPATIENT
Start: 2021-06-30 | End: 2021-06-30

## 2021-06-30 RX ORDER — FAMOTIDINE 10 MG/ML
20 INJECTION INTRAVENOUS EVERY 12 HOURS
Refills: 0 | Status: DISCONTINUED | OUTPATIENT
Start: 2021-06-30 | End: 2021-07-03

## 2021-06-30 RX ORDER — KETOROLAC TROMETHAMINE 30 MG/ML
15 SYRINGE (ML) INJECTION ONCE
Refills: 0 | Status: DISCONTINUED | OUTPATIENT
Start: 2021-06-30 | End: 2021-06-30

## 2021-06-30 RX ORDER — SODIUM CHLORIDE 9 MG/ML
1000 INJECTION INTRAMUSCULAR; INTRAVENOUS; SUBCUTANEOUS ONCE
Refills: 0 | Status: COMPLETED | OUTPATIENT
Start: 2021-06-30 | End: 2021-06-30

## 2021-06-30 RX ADMIN — Medication 60 MILLIGRAM(S): at 18:29

## 2021-06-30 RX ADMIN — Medication 50 MILLIGRAM(S): at 10:00

## 2021-06-30 RX ADMIN — FAMOTIDINE 20 MILLIGRAM(S): 10 INJECTION INTRAVENOUS at 18:20

## 2021-06-30 RX ADMIN — SODIUM CHLORIDE 1000 MILLILITER(S): 9 INJECTION INTRAMUSCULAR; INTRAVENOUS; SUBCUTANEOUS at 09:59

## 2021-06-30 RX ADMIN — Medication 15 MILLIGRAM(S): at 09:59

## 2021-06-30 NOTE — ED ADULT NURSE REASSESSMENT NOTE - NS ED NURSE REASSESS COMMENT FT1
pt. states pain relief and no noted swelling visible with medications given, pt. resting comfortably, offers no complaints at this time.

## 2021-06-30 NOTE — ED PROVIDER NOTE - OBJECTIVE STATEMENT
26 y/o F with PMHx of spinal fusion in 2018, allergic to gabapentin presents to the ED with 3 days of a diffuse rash and pain. Two days ago pt has pain that started at her scalp and neck. Pain, rash and dysaphia has been progressively worsening. Denies fever, abdominal pain, SOB, nausea, headache, weakness. Pt reports she [facility] seen [facility] and was given dexamethasone with no improvement. 26 y/o F with PMHx of spinal fusion in 2018, allergic to gabapentin presents to the ED with 3 days of a diffuse rash and pain. Two days ago pt has pain that started at her scalp and neck. Pain, rash and odynophagia has been progressively worsening. Denies fever, abdominal pain, SOB, nausea, headache, weakness. Pt reports she was seen at [facility] and was given dexamethasone with no improvement.

## 2021-06-30 NOTE — ED PROVIDER NOTE - PATIENT PORTAL LINK FT
You can access the FollowMyHealth Patient Portal offered by [facility] by registering at the following website: [email or web address]. By joining [email or web address]’s FollowMyHealth portal, you will also be able to view your health information using other applications (apps) compatible with our system.

## 2021-06-30 NOTE — ED PROVIDER NOTE - PROGRESS NOTE DETAILS
Jai: Pt seen and eval by derm. Presumptive diagnosis of urticaria with angioedema. Patient to be transferred to CDU for cont management including antihistamines, steroids, IVF. Discuss plans with patient who acknowledged and agreed to plan. JUAN Dickson: on evaluation, patient refusing CDU stay overnight, discussed with patient plan, patient still does not want to stay for medications would like to be discharged home. airway patent, HD stable. Derm note appreciated.

## 2021-06-30 NOTE — ED ADULT NURSE NOTE - CHIEF COMPLAINT QUOTE
"Hard to swallow, I have a lump in my throat." "the hives are painful, when I touch them they feel firey." Patient speaking complete sentences. No audible stridor or wheezing. No drooling observed. Rash observed to back of neck and behind ear.

## 2021-06-30 NOTE — CONSULT NOTE ADULT - SUBJECTIVE AND OBJECTIVE BOX
HPI:  24 yo F p/w pruritic and painful rash on scalp, arms that began 3 days ago, w/ associated swelling of eyelids and lips. Received decadron injection without relief. She reports occasional difficulty breathing/swallowing that started today and prompted her ED visit. She mentions a similar episode in 2018 that took 3 weeks to resolve. denies new meds or any recent illnesses. ROS negative for F/C/rhinorrhea/cough/abdominal pain/dysuria/diarrhea.      PAST MEDICAL & SURGICAL HISTORY:  Anxiety    Depression    Spondylolisthesis of lumbosacral region    S/P endoscopy  and colonoscopy      Review of Systems:  Constitutional: denies fevers, chills  HEENT: dysphagia  Cardiovascular: denies palpitations  Respiratory: denies SOB, wheezing  Gastrointestinal: denies N/V/D, abdominal pain  : denies dysuria  MSK: denies weakness, joint pain  Skin: denies new rashes or masses unless otherwise specified in hpi    MEDICATIONS  (STANDING):  diphenhydrAMINE   Injectable 25 milliGRAM(s) IV Push every 8 hours  famotidine Injectable 20 milliGRAM(s) IV Push every 12 hours  methylPREDNISolone sodium succinate Injectable 60 milliGRAM(s) IV Push every 6 hours  sodium chloride 0.9%. 1000 milliLiter(s) IV Continuous <Continuous>    ALLERGIES: gabapentin    SOCIAL HISTORY: denies drug use  FAMILY HISTORY:  No pertinent family history in first degree relatives      VITAL SIGNS LAST 24 HOURS:  T(F): 98.8 (06-30 @ 14:26), Max: 98.8 (06-30 @ 14:26)  HR: 88 (06-30 @ 14:26) (88 - 91)  BP: 100/60 (06-30 @ 14:26) (100/60 - 114/78)  RR: 18 (06-30 @ 14:26) (18 - 19)    PHYSICAL EXAM:      In no apparent distress. Oropharynx showed no ulcerations. No visible lymphadenopathy. Conjunctiva were injected. No clubbing or edema of extremities. The scalp, hair, face, eyebrows, lips, oropharynx , neck, chest, back, buttocks, extremities X 4, hands, feet, nails were examined. No hyperhidrosis or bromhidrosis.     Of note on skin exam:  pink urticarial plaques on b/l ventral wrists that when re-examined hours later had faded to pink patches  edema of b/l upper/lower eyelides and upper cutaneous lip  pink patches on b/l temporal scalp    LABS:  WBC Count : 9.97 K/uL   Hemoglobin : 13.6 g/dL   Hematocrit : 40.4 %   Platelet Count - Automated : 246 K/uL    139  |  101  |  19  ----------------------------<  88  3.8   |  22  |  0.72    TPro  7.0  /  Alb  4.7  /  TBili  0.6  /  DBili  x   /  AST  13  /  ALT  9   /  AlkPhos  53  06-30  RADIOLOGY & ADDITIONAL STUDIES: no relevant studies

## 2021-06-30 NOTE — ED ADULT NURSE NOTE - NSIMPLEMENTINTERV_GEN_ALL_ED
Implemented All Universal Safety Interventions:  [place] to call system. Call bell, personal items and telephone within reach. Instruct patient to call for assistance. Room bathroom lighting operational. Non-slip footwear when patient is off stretcher. Physically safe environment: no spills, clutter or unnecessary equipment. Stretcher in lowest position, wheels locked, appropriate side rails in place.

## 2021-06-30 NOTE — ED PROVIDER NOTE - NSFOLLOWUPINSTRUCTIONS_ED_ALL_ED_FT
Follow up with your PMD within 48-72 hours.      Recommend consult with an Allergist. Referral list provided.       Show copies of your labs provided.      Start 10mg loratadine daily  Start 25mg benadryl q6hr PRN breakthrough hives  Take 40mg prednisone daily x 7 days, followed by 20mg prednisone daily x 7 days   If urticarial/angioedema has resolved by 14 days, can stop the prednisone at that point    Patient can follow up at our outpatient Dermatology office within 1 week(s)  [address], [address], [facility]  [phone]    Worsening or new shortness of breath, tightness in your throat, swelling, chest pain, fever, chills, return to the ER

## 2021-06-30 NOTE — ED ADULT TRIAGE NOTE - CHIEF COMPLAINT QUOTE
"Hard to swallow, I have a lump in my throat." "the hives are painful, when I touch them they feel firey." Airway intact. Patient speaking complete sentences. Rash observed to back of neck and behind ear. "Hard to swallow, I have a lump in my throat." "the hives are painful, when I touch them they feel firey." Patient speaking complete sentences. No audible stridor or wheezing. No drooling observed. Rash observed to back of neck and behind ear.

## 2021-06-30 NOTE — ED PROVIDER NOTE - ENMT, MLM
Airway patent, Nasal mucosa clear. Mouth with normal mucosa. Throat has no vesicles, no oropharyngeal exudates and uvula is midline. Oropharynx clear, no noted lesions, no cervical lymphadenopathy

## 2021-06-30 NOTE — ED PROVIDER NOTE - CLINICAL SUMMARY MEDICAL DECISION MAKING FREE TEXT BOX
24 y/o F presents with pruritic painful rash. Unclear etiology. Possible allergic and less likely infection. Plan to obtain labs,   analgesics and reassess.

## 2021-06-30 NOTE — ED ADULT NURSE NOTE - OBJECTIVE STATEMENT
Pt. is A&Ox3 presents to ER c/o difficulty swallowing due to lump in throat. Pt. states the hives are painful. Patient speaking complete sentences. No audible stridor or wheezing. No drooling observed. Rash observed to back of neck and behind ear, redness and swelling to bilateral ears. Respirations even and unlabored. PT. denies any new food introduced in diet and denies any new skin care product use, states she does not know why she is experiencing allergic reaction. 20G IV obtained in LAC, labs obtained, flushing without resistance, dressing dry and intact. Medicated as ordered

## 2021-06-30 NOTE — CONSULT NOTE ADULT - ASSESSMENT
#Acute urticaria with angioedema  Acute urticaria can have multiple etiologies including most commonly allergic or infectious, and in some cases idiopathic. Lesions can persist for several weeks, and consistent use of antihistamines is usually indicated for at least the duration of this time.     At this time recommend:  -start 10mg loratadine daily  -start 25mg benedryl q6hr PRN breakthrough hives  -given the presence of angioedema, would also recommend a course of systemic steroids:  40mg prednisone daily x 7 days, followed by 20mg prednisone daily x 7 days   If urticarial/angioedema has resolved by 14 days, can stop the prednisone at that point  -would recommend discharging with an epipen in case of anaphylaxis  Patient can follow up at our outpatient Dermatology office within 1 week(s)  [address], [address], [facility]  [phone]    The patient's chart was reviewed in addition to being seen and examined at bedside with the dermatology attending Dr. Mega Escalera.  Recommendations were communicated with the primary team.  Please page [phone] for further related questions.    Page Sheehan MD  Resident Physician, PGY2  [facility] Dermat[facility]  Pager: [phone]  Office: [phone]     #Acute urticaria with angioedema  Acute urticaria can have multiple etiologies including most commonly allergic or infectious, and in some cases idiopathic. Lesions can persist for several weeks, and consistent use of antihistamines is usually indicated for at least the duration of this time.     At this time recommend:  -start 10mg loratadine daily  -start 25mg benedryl q6hr PRN breakthrough hives  -given the presence of angioedema, would also recommend a course of systemic steroids:  40mg prednisone daily x 7 days, followed by 20mg prednisone daily x 7 days, and then 10 mg daily X 7 days  If urticarial/angioedema has resolved by 14 days, can stop the prednisone at that point  -would recommend discharging with an epipen in case of anaphylaxis  Patient can follow up at our outpatient Dermatology office within 1 week(s)  [address], [address], [facility]  [phone]    The patient's chart was reviewed in addition to being seen and examined at bedside with the dermatology attending Dr. Mega Escalera.  Recommendations were communicated with the primary team.  Please page [phone] for further related questions.    Page Sheehan MD  Resident Physician, PGY2  [facility] Dermatology  Pager: [phone]  Office: [phone]

## 2021-06-30 NOTE — CONSULT NOTE ADULT - ATTENDING COMMENTS
Rash consistent w/ acute urticaria and angioedema. Source of urticaria is unclear. Viral infections and new medications are the most common culprits, but the majority of cases are considered idiopathic as no etiology can be identified. She also reported a similar episode several years ago which resolved in 3 weeks. This would likely be a separate episode of acute urticaria. Treatment options include antihistamines and the addition of a systemic corticosteroid for more severe cases. As she has mild angioedema, and is reporting difficulty swallowing, would recommend addition of prednisone. Most patients improve with a 2-3 week course, although it can take several days and up to 1-2 weeks for the lesions to improve in some cases of acute urticaria. I would not be discouraged if she does not feel better with a single dose of dexamethasone. She should have close outpatient f/u with us.    Mega Escalera MD, PharmD, MPH  Co-Director, Inpatient Dermatology Consultation Service, [facility]

## 2021-07-06 LAB — B BURGDOR DNA SPEC QL NAA+PROBE: NEGATIVE — SIGNIFICANT CHANGE UP

## 2021-09-07 ENCOUNTER — NON-APPOINTMENT (OUTPATIENT)
Age: 26
End: 2021-09-07

## 2021-09-07 ENCOUNTER — APPOINTMENT (OUTPATIENT)
Dept: OBGYN | Facility: CLINIC | Age: 26
End: 2021-09-07
Payer: COMMERCIAL

## 2021-09-07 VITALS
HEIGHT: 63 IN | BODY MASS INDEX: 24.27 KG/M2 | SYSTOLIC BLOOD PRESSURE: 108 MMHG | DIASTOLIC BLOOD PRESSURE: 69 MMHG | HEART RATE: 85 BPM | WEIGHT: 137 LBS

## 2021-09-07 PROCEDURE — 99212 OFFICE O/P EST SF 10 MIN: CPT

## 2021-09-07 RX ORDER — EPINEPHRINE 0.3 MG/.3ML
0.3 INJECTION INTRAMUSCULAR
Qty: 2 | Refills: 0 | Status: DISCONTINUED | COMMUNITY
Start: 2021-06-30

## 2021-09-07 RX ORDER — FAMOTIDINE 20 MG/1
20 TABLET, FILM COATED ORAL
Qty: 10 | Refills: 0 | Status: DISCONTINUED | COMMUNITY
Start: 2021-07-06

## 2021-09-07 RX ORDER — NITROFURANTOIN (MONOHYDRATE/MACROCRYSTALS) 25; 75 MG/1; MG/1
100 CAPSULE ORAL
Qty: 8 | Refills: 0 | Status: DISCONTINUED | COMMUNITY
Start: 2021-03-15

## 2021-09-07 RX ORDER — DEXAMETHASONE 1 MG/1
1 TABLET ORAL
Qty: 1 | Refills: 0 | Status: DISCONTINUED | COMMUNITY
Start: 2021-05-28 | End: 2021-09-07

## 2021-09-07 RX ORDER — FEXOFENADINE HYDROCHLORIDE 180 MG/1
180 TABLET ORAL
Qty: 30 | Refills: 0 | Status: DISCONTINUED | COMMUNITY
Start: 2021-07-07

## 2021-11-04 NOTE — ED PROVIDER NOTE - NS_ ATTENDINGSCRIBEDETAILS _ED_A_ED_FT
Impression: S/P Posterior Vitrectomy (PPvit) OS - 1 Day. Encounter for surgical aftercare following surgery on a sense organ  Z48.810. Plan: Continue viamox 3x a day, pred 3x a for 1 week then alyx. healing well. Continue as scheduled. NT

## 2021-12-09 ENCOUNTER — NON-APPOINTMENT (OUTPATIENT)
Age: 26
End: 2021-12-09

## 2021-12-10 ENCOUNTER — APPOINTMENT (OUTPATIENT)
Dept: OBGYN | Facility: CLINIC | Age: 26
End: 2021-12-10
Payer: COMMERCIAL

## 2021-12-10 VITALS
HEART RATE: 114 BPM | DIASTOLIC BLOOD PRESSURE: 74 MMHG | BODY MASS INDEX: 23.74 KG/M2 | WEIGHT: 134 LBS | TEMPERATURE: 97.2 F | SYSTOLIC BLOOD PRESSURE: 112 MMHG | HEIGHT: 63 IN

## 2021-12-10 PROCEDURE — 99213 OFFICE O/P EST LOW 20 MIN: CPT

## 2021-12-10 RX ORDER — ESCITALOPRAM OXALATE 10 MG/1
10 TABLET ORAL
Qty: 30 | Refills: 1 | Status: DISCONTINUED | COMMUNITY
Start: 1900-01-01 | End: 2021-12-10

## 2021-12-13 ENCOUNTER — TRANSCRIPTION ENCOUNTER (OUTPATIENT)
Age: 26
End: 2021-12-13

## 2021-12-13 NOTE — PHYSICAL EXAM
[No Acute Distress] : no acute distress [Well Nourished] : well nourished [Well Developed] : well developed [Well-Appearing] : well-appearing [Normal Sclera/Conjunctiva] : normal sclera/conjunctiva [EOMI] : extraocular movements intact [Normal Outer Ear/Nose] : the outer ears and nose were normal in appearance [Normal Oropharynx] : the oropharynx was normal [Normal TMs] : both tympanic membranes were normal [No JVD] : no jugular venous distention [No Lymphadenopathy] : no lymphadenopathy [Supple] : supple [No Respiratory Distress] : no respiratory distress  [No Accessory Muscle Use] : no accessory muscle use [Clear to Auscultation] : lungs were clear to auscultation bilaterally [Normal Rate] : normal rate  [Regular Rhythm] : with a regular rhythm [Normal S1, S2] : normal S1 and S2 [No Murmur] : no murmur heard [Pedal Pulses Present] : the pedal pulses are present [No Edema] : there was no peripheral edema [Soft] : abdomen soft [Non Tender] : non-tender [Non-distended] : non-distended [No Masses] : no abdominal mass palpated [No HSM] : no HSM [Normal Bowel Sounds] : normal bowel sounds [Normal Posterior Cervical Nodes] : no posterior cervical lymphadenopathy [Normal Anterior Cervical Nodes] : no anterior cervical lymphadenopathy [No CVA Tenderness] : no CVA  tenderness [No Spinal Tenderness] : no spinal tenderness [No Joint Swelling] : no joint swelling [Grossly Normal Strength/Tone] : grossly normal strength/tone [No Rash] : no rash [No Focal Deficits] : no focal deficits [Normal Gait] : normal gait [Deep Tendon Reflexes (DTR)] : deep tendon reflexes were 2+ and symmetric [Normal Affect] : the affect was normal [Normal Insight/Judgement] : insight and judgment were intact

## 2021-12-13 NOTE — REVIEW OF SYSTEMS
[Suicidal] : not suicidal [Insomnia] : no insomnia [Anxiety] : anxiety [Easy Bleeding] : no easy bleeding [Easy Bruising] : easy bruising [Swollen Glands] : no swollen glands [Negative] : Heme/Lymph

## 2021-12-13 NOTE — HISTORY OF PRESENT ILLNESS
[de-identified] : 26 year old female with Anxiety presents for folow up after recent hospitalization to [facility]  for manic  episode . She stayed in the hospital for  3 days . She is following with  psychologist weekly\par She was  started on Lexapro 10 mg QD. \par Pt also c/o easy bruising and intermittent hives for which she takes  Benadryl prn\par She is otherwise denies CP/SOB, dizziness, mood changes

## 2021-12-14 ENCOUNTER — NON-APPOINTMENT (OUTPATIENT)
Age: 26
End: 2021-12-14

## 2021-12-14 DIAGNOSIS — B00.9 HERPESVIRAL INFECTION, UNSPECIFIED: ICD-10-CM

## 2021-12-14 LAB
C TRACH RRNA SPEC QL NAA+PROBE: DETECTED
HBV SURFACE AG SER QL: NONREACTIVE
HCV AB SER QL: NONREACTIVE
HCV S/CO RATIO: 0.1 S/CO
HIV1+2 AB SPEC QL IA.RAPID: NONREACTIVE
N GONORRHOEA RRNA SPEC QL NAA+PROBE: NOT DETECTED
SOURCE AMPLIFICATION: NORMAL
T PALLIDUM AB SER QL IA: NEGATIVE

## 2021-12-16 PROBLEM — B00.9 HERPES SIMPLEX TYPE 1 INFECTION: Status: ACTIVE | Noted: 2021-12-16

## 2021-12-16 LAB
HHV SPEC CULT: ABNORMAL
HSV TYPE 1: ABNORMAL
HSV TYPE 2: NORMAL

## 2022-01-27 ENCOUNTER — APPOINTMENT (OUTPATIENT)
Dept: INTERNAL MEDICINE | Facility: CLINIC | Age: 27
End: 2022-01-27
Payer: COMMERCIAL

## 2022-01-27 VITALS
TEMPERATURE: 97.8 F | DIASTOLIC BLOOD PRESSURE: 75 MMHG | RESPIRATION RATE: 12 BRPM | WEIGHT: 133 LBS | HEART RATE: 99 BPM | OXYGEN SATURATION: 98 % | SYSTOLIC BLOOD PRESSURE: 114 MMHG

## 2022-01-27 DIAGNOSIS — F41.0 PANIC DISORDER [EPISODIC PAROXYSMAL ANXIETY]: ICD-10-CM

## 2022-01-27 DIAGNOSIS — F41.9 ANXIETY DISORDER, UNSPECIFIED: ICD-10-CM

## 2022-01-27 PROCEDURE — 99214 OFFICE O/P EST MOD 30 MIN: CPT
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
## 2022-01-30 NOTE — PHYSICAL EXAM
[Normal Sclera/Conjunctiva] : normal sclera/conjunctiva [Normal Outer Ear/Nose] : the outer ears and nose were normal in appearance [No JVD] : no jugular venous distention [Normal] : normal rate, regular rhythm, normal S1 and S2 and no murmur heard [No Edema] : there was no peripheral edema [Soft] : abdomen soft [Non Tender] : non-tender [Non-distended] : non-distended [Normal Anterior Cervical Nodes] : no anterior cervical lymphadenopathy [No CVA Tenderness] : no CVA  tenderness [No Joint Swelling] : no joint swelling [Normal Gait] : normal gait [Speech Grossly Normal] : speech grossly normal [Alert and Oriented x3] : oriented to person, place, and time [Normal Insight/Judgement] : insight and judgment were intact [de-identified] : ROM are within normal limits at the cervical and lumbar spine [de-identified] : + superficial bruise in left mid thigh and a smaller one in right mid thigh
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
## 2022-03-01 ENCOUNTER — APPOINTMENT (OUTPATIENT)
Dept: OBGYN | Facility: CLINIC | Age: 27
End: 2022-03-01
Payer: COMMERCIAL

## 2022-03-01 VITALS
BODY MASS INDEX: 23.04 KG/M2 | HEART RATE: 95 BPM | DIASTOLIC BLOOD PRESSURE: 80 MMHG | SYSTOLIC BLOOD PRESSURE: 115 MMHG | TEMPERATURE: 98.1 F | HEIGHT: 63 IN | WEIGHT: 130 LBS

## 2022-03-01 DIAGNOSIS — Z86.59 PERSONAL HISTORY OF OTHER MENTAL AND BEHAVIORAL DISORDERS: ICD-10-CM

## 2022-03-01 DIAGNOSIS — R39.11 HESITANCY OF MICTURITION: ICD-10-CM

## 2022-03-01 PROCEDURE — 99212 OFFICE O/P EST SF 10 MIN: CPT

## 2022-03-01 RX ORDER — LAMOTRIGINE 25 MG/1
25 TABLET ORAL
Refills: 0 | Status: ACTIVE | COMMUNITY

## 2022-03-01 RX ORDER — DOXYCYCLINE 100 MG/1
100 CAPSULE ORAL
Qty: 14 | Refills: 0 | Status: DISCONTINUED | COMMUNITY
Start: 2021-12-14 | End: 2022-03-01

## 2022-03-01 RX ORDER — LORATADINE 10 MG/1
10 TABLET ORAL
Qty: 30 | Refills: 0 | Status: DISCONTINUED | COMMUNITY
Start: 2021-06-30 | End: 2022-03-01

## 2022-03-01 RX ORDER — MONTELUKAST 10 MG/1
10 TABLET, FILM COATED ORAL
Qty: 30 | Refills: 0 | Status: DISCONTINUED | COMMUNITY
Start: 2021-07-20 | End: 2022-03-01

## 2022-03-01 RX ORDER — NORETHINDRONE ACETATE AND ETHINYL ESTRADIOL AND FERROUS FUMARATE 1MG-20(21)
1-20 KIT ORAL DAILY
Qty: 3 | Refills: 3 | Status: DISCONTINUED | COMMUNITY
Start: 2021-09-07 | End: 2022-03-01

## 2022-03-01 NOTE — PHYSICAL EXAM
[Labia Majora] : normal [Labia Minora] : normal [No Bleeding] : There was no active vaginal bleeding [Normal] : normal [Uterine Adnexae] : non-palpable [Tenderness] : nontender

## 2022-03-07 LAB — BACTERIA UR CULT: ABNORMAL

## 2022-06-25 ENCOUNTER — EMERGENCY (EMERGENCY)
Facility: HOSPITAL | Age: 27
LOS: 1 days | Discharge: HOME | End: 2022-06-25
Attending: PERSONAL EMERGENCY RESPONSE ATTENDANT | Admitting: PERSONAL EMERGENCY RESPONSE ATTENDANT
Payer: COMMERCIAL

## 2022-06-25 VITALS
HEIGHT: 63.07 IN | HEART RATE: 97 BPM | DIASTOLIC BLOOD PRESSURE: 81 MMHG | TEMPERATURE: 98 F | RESPIRATION RATE: 16 BRPM | OXYGEN SATURATION: 100 % | SYSTOLIC BLOOD PRESSURE: 113 MMHG

## 2022-06-25 VITALS
SYSTOLIC BLOOD PRESSURE: 108 MMHG | TEMPERATURE: 99 F | HEART RATE: 74 BPM | OXYGEN SATURATION: 100 % | DIASTOLIC BLOOD PRESSURE: 67 MMHG | RESPIRATION RATE: 16 BRPM

## 2022-06-25 DIAGNOSIS — Z98.890 OTHER SPECIFIED POSTPROCEDURAL STATES: Chronic | ICD-10-CM

## 2022-06-25 LAB
ALBUMIN SERPL ELPH-MCNC: 5 G/DL — SIGNIFICANT CHANGE UP (ref 3.3–5)
ALP SERPL-CCNC: 54 U/L — SIGNIFICANT CHANGE UP (ref 40–120)
ALT FLD-CCNC: 12 U/L — SIGNIFICANT CHANGE UP (ref 4–33)
ANION GAP SERPL CALC-SCNC: 16 MMOL/L — HIGH (ref 7–14)
APPEARANCE UR: ABNORMAL
AST SERPL-CCNC: 17 U/L — SIGNIFICANT CHANGE UP (ref 4–32)
BACTERIA # UR AUTO: ABNORMAL
BASE EXCESS BLDV CALC-SCNC: 0 MMOL/L — SIGNIFICANT CHANGE UP (ref -2–3)
BASOPHILS # BLD AUTO: 0.03 K/UL — SIGNIFICANT CHANGE UP (ref 0–0.2)
BASOPHILS NFR BLD AUTO: 0.3 % — SIGNIFICANT CHANGE UP (ref 0–2)
BILIRUB SERPL-MCNC: 0.8 MG/DL — SIGNIFICANT CHANGE UP (ref 0.2–1.2)
BILIRUB UR-MCNC: NEGATIVE — SIGNIFICANT CHANGE UP
BLOOD GAS VENOUS COMPREHENSIVE RESULT: SIGNIFICANT CHANGE UP
BUN SERPL-MCNC: 16 MG/DL — SIGNIFICANT CHANGE UP (ref 7–23)
CALCIUM SERPL-MCNC: 9.7 MG/DL — SIGNIFICANT CHANGE UP (ref 8.4–10.5)
CHLORIDE BLDV-SCNC: 109 MMOL/L — HIGH (ref 96–108)
CHLORIDE SERPL-SCNC: 103 MMOL/L — SIGNIFICANT CHANGE UP (ref 98–107)
CO2 BLDV-SCNC: 26.7 MMOL/L — HIGH (ref 22–26)
CO2 SERPL-SCNC: 20 MMOL/L — LOW (ref 22–31)
COLOR SPEC: ABNORMAL
CREAT SERPL-MCNC: 0.71 MG/DL — SIGNIFICANT CHANGE UP (ref 0.5–1.3)
DIFF PNL FLD: ABNORMAL
EGFR: 122 ML/MIN/1.73M2 — SIGNIFICANT CHANGE UP
EOSINOPHIL # BLD AUTO: 0.09 K/UL — SIGNIFICANT CHANGE UP (ref 0–0.5)
EOSINOPHIL NFR BLD AUTO: 0.9 % — SIGNIFICANT CHANGE UP (ref 0–6)
EPI CELLS # UR: 8 /HPF — HIGH (ref 0–5)
GAS PNL BLDV: 138 MMOL/L — SIGNIFICANT CHANGE UP (ref 136–145)
GLUCOSE BLDV-MCNC: 85 MG/DL — SIGNIFICANT CHANGE UP (ref 70–99)
GLUCOSE SERPL-MCNC: 87 MG/DL — SIGNIFICANT CHANGE UP (ref 70–99)
GLUCOSE UR QL: NEGATIVE — SIGNIFICANT CHANGE UP
HCG SERPL-ACNC: <5 MIU/ML — SIGNIFICANT CHANGE UP
HCO3 BLDV-SCNC: 25 MMOL/L — SIGNIFICANT CHANGE UP (ref 22–29)
HCT VFR BLD CALC: 43.8 % — SIGNIFICANT CHANGE UP (ref 34.5–45)
HCT VFR BLDA CALC: 42 % — SIGNIFICANT CHANGE UP (ref 34.5–46.5)
HGB BLD CALC-MCNC: 14.1 G/DL — SIGNIFICANT CHANGE UP (ref 11.5–15.5)
HGB BLD-MCNC: 14.5 G/DL — SIGNIFICANT CHANGE UP (ref 11.5–15.5)
HYALINE CASTS # UR AUTO: 7 /LPF — SIGNIFICANT CHANGE UP (ref 0–7)
IANC: 8.23 K/UL — HIGH (ref 1.8–7.4)
IMM GRANULOCYTES NFR BLD AUTO: 0.3 % — SIGNIFICANT CHANGE UP (ref 0–1.5)
KETONES UR-MCNC: ABNORMAL
LACTATE BLDV-MCNC: 1 MMOL/L — SIGNIFICANT CHANGE UP (ref 0.5–2)
LEUKOCYTE ESTERASE UR-ACNC: ABNORMAL
LIDOCAIN IGE QN: 16 U/L — SIGNIFICANT CHANGE UP (ref 7–60)
LYMPHOCYTES # BLD AUTO: 1.47 K/UL — SIGNIFICANT CHANGE UP (ref 1–3.3)
LYMPHOCYTES # BLD AUTO: 14.2 % — SIGNIFICANT CHANGE UP (ref 13–44)
MCHC RBC-ENTMCNC: 29.5 PG — SIGNIFICANT CHANGE UP (ref 27–34)
MCHC RBC-ENTMCNC: 33.1 GM/DL — SIGNIFICANT CHANGE UP (ref 32–36)
MCV RBC AUTO: 89 FL — SIGNIFICANT CHANGE UP (ref 80–100)
MONOCYTES # BLD AUTO: 0.53 K/UL — SIGNIFICANT CHANGE UP (ref 0–0.9)
MONOCYTES NFR BLD AUTO: 5.1 % — SIGNIFICANT CHANGE UP (ref 2–14)
NEUTROPHILS # BLD AUTO: 8.23 K/UL — HIGH (ref 1.8–7.4)
NEUTROPHILS NFR BLD AUTO: 79.2 % — HIGH (ref 43–77)
NITRITE UR-MCNC: POSITIVE
NRBC # BLD: 0 /100 WBCS — SIGNIFICANT CHANGE UP
NRBC # FLD: 0 K/UL — SIGNIFICANT CHANGE UP
PCO2 BLDV: 43 MMHG — HIGH (ref 39–42)
PH BLDV: 7.38 — SIGNIFICANT CHANGE UP (ref 7.32–7.43)
PH UR: 6 — SIGNIFICANT CHANGE UP (ref 5–8)
PLATELET # BLD AUTO: 200 K/UL — SIGNIFICANT CHANGE UP (ref 150–400)
PO2 BLDV: 38 MMHG — SIGNIFICANT CHANGE UP
POTASSIUM BLDV-SCNC: 3.9 MMOL/L — SIGNIFICANT CHANGE UP (ref 3.5–5.1)
POTASSIUM SERPL-MCNC: 4.1 MMOL/L — SIGNIFICANT CHANGE UP (ref 3.5–5.3)
POTASSIUM SERPL-SCNC: 4.1 MMOL/L — SIGNIFICANT CHANGE UP (ref 3.5–5.3)
PROT SERPL-MCNC: 7.7 G/DL — SIGNIFICANT CHANGE UP (ref 6–8.3)
PROT UR-MCNC: ABNORMAL
RBC # BLD: 4.92 M/UL — SIGNIFICANT CHANGE UP (ref 3.8–5.2)
RBC # FLD: 12.4 % — SIGNIFICANT CHANGE UP (ref 10.3–14.5)
RBC CASTS # UR COMP ASSIST: 3 /HPF — SIGNIFICANT CHANGE UP (ref 0–4)
SAO2 % BLDV: 61.8 % — SIGNIFICANT CHANGE UP
SODIUM SERPL-SCNC: 139 MMOL/L — SIGNIFICANT CHANGE UP (ref 135–145)
SP GR SPEC: 1.02 — SIGNIFICANT CHANGE UP (ref 1–1.05)
UROBILINOGEN FLD QL: SIGNIFICANT CHANGE UP
WBC # BLD: 10.38 K/UL — SIGNIFICANT CHANGE UP (ref 3.8–10.5)
WBC # FLD AUTO: 10.38 K/UL — SIGNIFICANT CHANGE UP (ref 3.8–10.5)
WBC UR QL: 481 /HPF — HIGH (ref 0–5)

## 2022-06-25 PROCEDURE — 74177 CT ABD & PELVIS W/CONTRAST: CPT | Mod: 26,MA

## 2022-06-25 PROCEDURE — 99285 EMERGENCY DEPT VISIT HI MDM: CPT

## 2022-06-25 RX ORDER — SODIUM CHLORIDE 9 MG/ML
1000 INJECTION INTRAMUSCULAR; INTRAVENOUS; SUBCUTANEOUS ONCE
Refills: 0 | Status: COMPLETED | OUTPATIENT
Start: 2022-06-25 | End: 2022-06-25

## 2022-06-25 RX ORDER — ONDANSETRON 8 MG/1
1 TABLET, FILM COATED ORAL
Qty: 12 | Refills: 0
Start: 2022-06-25 | End: 2022-06-28

## 2022-06-25 RX ORDER — CEFPODOXIME PROXETIL 100 MG
1 TABLET ORAL
Qty: 20 | Refills: 0
Start: 2022-06-25 | End: 2022-07-04

## 2022-06-25 RX ORDER — MORPHINE SULFATE 50 MG/1
4 CAPSULE, EXTENDED RELEASE ORAL ONCE
Refills: 0 | Status: DISCONTINUED | OUTPATIENT
Start: 2022-06-25 | End: 2022-06-25

## 2022-06-25 RX ORDER — ONDANSETRON 8 MG/1
4 TABLET, FILM COATED ORAL ONCE
Refills: 0 | Status: COMPLETED | OUTPATIENT
Start: 2022-06-25 | End: 2022-06-25

## 2022-06-25 RX ORDER — CEFTRIAXONE 500 MG/1
1000 INJECTION, POWDER, FOR SOLUTION INTRAMUSCULAR; INTRAVENOUS ONCE
Refills: 0 | Status: COMPLETED | OUTPATIENT
Start: 2022-06-25 | End: 2022-06-25

## 2022-06-25 RX ORDER — OXYCODONE AND ACETAMINOPHEN 5; 325 MG/1; MG/1
1 TABLET ORAL
Qty: 16 | Refills: 0
Start: 2022-06-25 | End: 2022-06-28

## 2022-06-25 RX ADMIN — MORPHINE SULFATE 4 MILLIGRAM(S): 50 CAPSULE, EXTENDED RELEASE ORAL at 13:57

## 2022-06-25 RX ADMIN — ONDANSETRON 4 MILLIGRAM(S): 8 TABLET, FILM COATED ORAL at 13:57

## 2022-06-25 RX ADMIN — ONDANSETRON 4 MILLIGRAM(S): 8 TABLET, FILM COATED ORAL at 16:14

## 2022-06-25 RX ADMIN — SODIUM CHLORIDE 1000 MILLILITER(S): 9 INJECTION INTRAMUSCULAR; INTRAVENOUS; SUBCUTANEOUS at 13:57

## 2022-06-25 RX ADMIN — CEFTRIAXONE 100 MILLIGRAM(S): 500 INJECTION, POWDER, FOR SOLUTION INTRAMUSCULAR; INTRAVENOUS at 15:33

## 2022-06-25 NOTE — ED PROVIDER NOTE - NSFOLLOWUPINSTRUCTIONS_ED_ALL_ED_FT
Follow up with OBGYN in the next 2-3 days; bring your paperwork with you to your appointment to discuss.     You were prescribed cefpodoxime, take your cefpodoxime medication twice a day (once in the morning and once in the evening) for 10 days.     You were prescribed percocet. Take your percocet medication up to every 8 hours as needed for breakthrough pain. Do not drink or drive for 24 hours while using this medication.     You were prescribed zofran for nausea, take this medication as needed for nausea up to every 4-6 hours. Do not take this medication if you do not feel nauseous.     [facility]  Gynecology Oncology  [address] [place] [address]  Phone: [phone]  Fax: [phone]    [facility] - Urology  Urology  [address] [facility], 3rd & 4th [place] [address]  Phone: [phone]    Urinary Tract Infection in Women    WHAT YOU NEED TO KNOW:    A urinary tract infection (UTI) is caused by bacteria that get inside your urinary tract. Most bacteria that enter your urinary tract come out when you urinate. If the bacteria stay in your urinary tract, you may get an infection. Your urinary tract includes your kidneys, ureters, bladder, and urethra. Urine is made in your kidneys, and it flows from the ureters to the bladder. Urine leaves the bladder through the urethra. A UTI is more common in your lower urinary tract, which includes your bladder and urethra.  Female Urinary System    DISCHARGE INSTRUCTIONS:    Seek care immediately if:    You are urinating very little or not at all.    You have a high fever with shaking chills.    You have side or back pain that gets worse.  Call your doctor if:    You have a fever.    You do not feel better after 2 days of taking antibiotics.    You are vomiting.    You have questions or concerns about your condition or care.  Medicines:    Antibiotics help fight a bacterial infection. If you have UTIs often (called recurrent UTIs), you may be given antibiotics to take regularly. You will be given directions for when and how to use antibiotics. The goal is to prevent UTIs but not cause antibiotic resistance by using antibiotics too often.    Medicines may be given to decrease pain and burning when you urinate. They will also help decrease the feeling that you need to urinate often. These medicines will make your urine orange or red.    Take your medicine as directed. Contact your healthcare provider if you think your medicine is not helping or if you have side effects. Tell him or her if you are allergic to any medicine. Keep a list of the medicines, vitamins, and herbs you take. Include the amounts, and when and why you take them. Bring the list or the pill bottles to follow-up visits. Carry your medicine list with you in case of an emergency.  Follow up with your healthcare provider as directed: Write down your questions so you remember to ask them during your visits.    Prevent another UTI:    Empty your bladder often. Urinate and empty your bladder as soon as you feel the need. Do not hold your urine for long periods of time.    Wipe from front to back after you urinate or have a bowel movement. This will help prevent germs from getting into your urinary tract through your urethra.    Drink liquids as directed. Ask how much liquid to drink each day and which liquids are best for you. You may need to drink more liquids than usual to help flush out the bacteria. Do not drink alcohol, caffeine, or citrus juices. These can irritate your bladder and increase your symptoms. Your healthcare provider may recommend cranberry juice to help prevent a UTI.    Urinate after you have sex. This can help flush out bacteria passed during sex.    Do not douche or use feminine deodorants. These can change the chemical balance in your vagina.    Change sanitary pads or tampons often. This will help prevent germs from getting into your urinary tract.    Talk to your healthcare provider about your birth control method. You may need to change your method if it is increasing your risk for UTIs.    Wear cotton underwear and clothes that are loose. Tight pants and nylon underwear can trap moisture and cause bacteria to grow.    Vaginal estrogen may be recommended. This medicine helps prevent UTIs in women who have gone through menopause or are in estela-menopause.    Do pelvic muscle exercises often. Pelvic muscle exercises may help you start and stop urinating. Strong pelvic muscles may help you empty your bladder easier. Squeeze these muscles tightly for 5 seconds like you are trying to hold back urine. Then relax for 5 seconds. Gradually work up to squeezing for 10 seconds. Do 3 sets of 15 repetitions a day, or as directed.    Ovarian Cyst  WHAT YOU NEED TO KNOW:  An ovarian cyst is a sac that grows on an ovary. This sac usually contains fluid, but may sometimes have blood or tissue in it. Most ovarian cysts are harmless and go away without treatment in a few months. Some cysts can grow large, cause pain, or break open.    DISCHARGE INSTRUCTIONS:  Call 911 for any of the following:  You are too weak or dizzy to stand up.  Seek care immediately if:  You have severe abdominal pain. The pain may be sharp and sudden.  You have a fever.  Contact your healthcare provider if:  Your periods are early, late, or more painful than usual.  You have bleeding from your vagina that is not your period.  You have abdominal pain all the time.  Your abdomen is swollen.  You have feelings of fullness, pressure, or discomfort in your abdomen.  You have trouble urinating or emptying your bladder completely.  You have pain during sex.  You are losing weight without trying.  You have questions or concerns about your condition or care.  Medicines: You may need any of the following:    NSAIDs, such as ibuprofen, help decrease swelling, pain, and fever. This medicine is available with or without a doctor's order. NSAIDs can cause stomach bleeding or kidney problems in certain people. If you take blood thinner medicine, always ask if NSAIDs are safe for you. Always read the medicine label and follow directions. Do not give these medicines to children under 6 months of age without direction from your child's healthcare provider.    Birth control pills may help to control your periods, prevent cysts, or cause them to shrink.    Take your medicine as directed. Contact your healthcare provider if you think your medicine is not helping or if you have side effects. Tell him or her if you are allergic to any medicine. Keep a list of the medicines, vitamins, and herbs you take. Include the amounts, and when and why you take them. Bring the list or the pill bottles to follow-up visits. Carry your medicine list with you in case of an emergency.  Follow up with your healthcare provider as directed: Write down your questions so you remember to ask them during your visits.    Apply heat to decrease pain and cramping: Sit in a warm bath, or place a heating pad (turned on low) or a hot water bottle on your abdomen. Do this for 15 to 20 minutes every hour for as many days as directed.

## 2022-06-25 NOTE — ED PROVIDER NOTE - PROGRESS NOTE DETAILS
Bella JEFFERSON PGY-2: patient reassessed at bedside to perform pelvic, sitting comfortably in bed getting labs drawn and about to receive pain medications. pelvic exam performed with no adnexal ttp appreciated, normal appearing cervix with physiologic discharge Bella JEFFERSON PGY-2: rocephin started for UTI noted on u/a. Patient with vomiting, will give zofran Bella JEFFERSON PGY-2: Pt was re-evaluated at bedside, VSS, feeling well overall. Return precautions and follow up plan with PCP and/or specialist were discussed. Time was taken to answer any questions that the patient had before providing them with discharge paperwork. Bella JEFFERSON PGY-2: patient reassessed at bedside, feels improved. Discussed CT results with patient, all answers and concerns addressed.
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
## 2022-06-25 NOTE — ED PROVIDER NOTE - NSFOLLOWUPCLINICS_GEN_ALL_ED_FT
[facility] - Urology  Urology  [address] [facility], 3rd & [address]th [place] [address]  Phone: [phone]  Fax:     [facility]  Urology  [address] [place] [address] - 3rd [place] [address]  Phone: [phone]  Fax:

## 2022-06-25 NOTE — ED ADULT TRIAGE NOTE - CHIEF COMPLAINT QUOTE
Pt c/o RLQ pain that started this morning. Endorsing n/v/d. Appears uncomfortable in triage. Phx: spinal fusion, anxiety, depression

## 2022-06-25 NOTE — ED PROVIDER NOTE - OBJECTIVE STATEMENT
27 yo F hx borderline personality disorder on lamectol, presenting with severe RLQ pain persistent since last night associated with severe nausea. No prior abdominal surgeries. LMP couple of weeks ago, patient has unprotective sexual intercourse with 1 partner. No urinary symptoms and no back pain. Denies any chest pain/sob. No vaginal bleeding or abnormal discharge. No fever/chills.     NKDA

## 2022-06-25 NOTE — ED PROVIDER NOTE - ATTENDING CONTRIBUTION TO CARE
Attending MD Adams.  Agree with above.  PT is a 27 yo fem with pmhx of borderline personality disorder on lamictal only who presents to ED with complaint of RLQ pain onset this morning after she went to bed in normal state of health last night.  Pt states that she awoke with RLQ discomfort, nausea, generalized malaise, chills and feeling unwell.  Sxs gradually worsened and pt called parents to drive her to the ED because any movement is not tolerable and she is unable to move RLE without worsened pain.  On exam on arrival pt is very tender to RLQ/R mid abdomen.  Denies vaginal discharge/bleeding.  LMP 2 wks ago.  Sexually active with single male partner without barrier contraception.  Does not take OCP's.  Planned HCG, pelvic exam, blood work and CT vs. sono depending on pelvic exam findings.  Of note, pt endorses being txed for recurrent UTI's recently but states that she followed-up with urology and was told she'd cleared her UTI's.  Pt endorses some associated diarrhea with above sxs.

## 2022-06-25 NOTE — ED PROVIDER NOTE - ENMT, MLM
DATE & TIME: 3.22.21 [id number]    Cognitive Concerns/ Orientation : AO4   BEHAVIOR & AGGRESSION TOOL COLOR: Green   ABNL VS/O2: VSS on CPAP 5L @ 93%O2  MOBILITY: A2 GBW  PAIN MANAGMENT: 2/10 neck back pain. Oxy 5mg relieved pain.   DIET: DD3 Thin Liquid  BOWEL/BLADDER: Continent  ABNL LAB/BG: PIV Infusing NS @ 75mL/hr  DRAIN/DEVICES: JP1x 15cc  TELEMETRY RHYTHM: NA  SKIN: Brusied  TESTS/PROCEDURES: NA  D/C DAY/GOALS/PLACE: Pending.  OTHER IMPORTANT INFO: POD1: C6-T1 Fusion w. Dr. Collier. CMS intact ex for numbness + tingling BLE + RUE. Soft brace on at all times.       Airway patent, Nasal mucosa clear. Mouth with normal mucosa. Throat has no vesicles, no oropharyngeal exudates and uvula is midline.

## 2022-06-25 NOTE — ED PROVIDER NOTE - PATIENT PORTAL LINK FT
You can access the FollowMyHealth Patient Portal offered by [facility] by registering at the following website: [email or web address]. By joining [employer]’s FollowMyHealth portal, you will also be able to view your health information using other applications (apps) compatible with our system.

## 2022-06-25 NOTE — ED PROVIDER NOTE - CLINICAL SUMMARY MEDICAL DECISION MAKING FREE TEXT BOX
Bella JEFFERSON PGY-2: 25 yo F no abdominal surg hx presenting with RLQ pain a/w nausea. Afebrile in the ED, VSS. Patient uncomfortable-appearing, with RLQ pain c/f appenditis vs GYN etiology such as cyst rupture vs ectopic; will perform pelvic exam to eval for adnexal ttp and obtain hcg to eval for ectopic, although lower suspicion given non-colicky pain, if +hcg will obtain transvaginal US to eval. Will obtain labs, ct imaging. Morphien and zofran for pain control.

## 2022-06-25 NOTE — ED PROVIDER NOTE - PHYSICAL EXAMINATION
pelvic exam: chaperoned by RN Do: No bleeding on pad. External labia wnl. Speculum Exam: Physiologic discharge. Bimanual Exam: No posterior cul de sac tenderness, no adnexal tenderness

## 2022-09-19 ENCOUNTER — EMERGENCY (EMERGENCY)
Facility: HOSPITAL | Age: 27
LOS: 1 days | Discharge: HOME | End: 2022-09-19
Attending: EMERGENCY MEDICINE | Admitting: EMERGENCY MEDICINE

## 2022-09-19 VITALS
TEMPERATURE: 98 F | HEIGHT: 63.07 IN | SYSTOLIC BLOOD PRESSURE: 115 MMHG | RESPIRATION RATE: 16 BRPM | OXYGEN SATURATION: 100 % | DIASTOLIC BLOOD PRESSURE: 78 MMHG | HEART RATE: 95 BPM

## 2022-09-19 DIAGNOSIS — Z98.890 OTHER SPECIFIED POSTPROCEDURAL STATES: Chronic | ICD-10-CM

## 2022-09-19 LAB
ALBUMIN SERPL ELPH-MCNC: 4.8 G/DL — SIGNIFICANT CHANGE UP (ref 3.3–5)
ALP SERPL-CCNC: 52 U/L — SIGNIFICANT CHANGE UP (ref 40–120)
ALT FLD-CCNC: 13 U/L — SIGNIFICANT CHANGE UP (ref 4–33)
ANION GAP SERPL CALC-SCNC: 12 MMOL/L — SIGNIFICANT CHANGE UP (ref 7–14)
APPEARANCE UR: ABNORMAL
AST SERPL-CCNC: 17 U/L — SIGNIFICANT CHANGE UP (ref 4–32)
B PERT DNA SPEC QL NAA+PROBE: SIGNIFICANT CHANGE UP
B PERT+PARAPERT DNA PNL SPEC NAA+PROBE: SIGNIFICANT CHANGE UP
BASOPHILS # BLD AUTO: 0.05 K/UL — SIGNIFICANT CHANGE UP (ref 0–0.2)
BASOPHILS NFR BLD AUTO: 0.4 % — SIGNIFICANT CHANGE UP (ref 0–2)
BILIRUB SERPL-MCNC: 0.9 MG/DL — SIGNIFICANT CHANGE UP (ref 0.2–1.2)
BILIRUB UR-MCNC: NEGATIVE — SIGNIFICANT CHANGE UP
BLOOD GAS VENOUS COMPREHENSIVE RESULT: SIGNIFICANT CHANGE UP
BORDETELLA PARAPERTUSSIS (RAPRVP): SIGNIFICANT CHANGE UP
BUN SERPL-MCNC: 12 MG/DL — SIGNIFICANT CHANGE UP (ref 7–23)
C PNEUM DNA SPEC QL NAA+PROBE: SIGNIFICANT CHANGE UP
CALCIUM SERPL-MCNC: 9.8 MG/DL — SIGNIFICANT CHANGE UP (ref 8.4–10.5)
CHLORIDE SERPL-SCNC: 103 MMOL/L — SIGNIFICANT CHANGE UP (ref 98–107)
CO2 SERPL-SCNC: 25 MMOL/L — SIGNIFICANT CHANGE UP (ref 22–31)
COLOR SPEC: YELLOW — SIGNIFICANT CHANGE UP
CREAT SERPL-MCNC: 0.66 MG/DL — SIGNIFICANT CHANGE UP (ref 0.5–1.3)
DIFF PNL FLD: ABNORMAL
EGFR: 125 ML/MIN/1.73M2 — SIGNIFICANT CHANGE UP
EOSINOPHIL # BLD AUTO: 0.06 K/UL — SIGNIFICANT CHANGE UP (ref 0–0.5)
EOSINOPHIL NFR BLD AUTO: 0.5 % — SIGNIFICANT CHANGE UP (ref 0–6)
FLUAV SUBTYP SPEC NAA+PROBE: SIGNIFICANT CHANGE UP
FLUBV RNA SPEC QL NAA+PROBE: SIGNIFICANT CHANGE UP
GLUCOSE SERPL-MCNC: 87 MG/DL — SIGNIFICANT CHANGE UP (ref 70–99)
GLUCOSE UR QL: NEGATIVE — SIGNIFICANT CHANGE UP
HADV DNA SPEC QL NAA+PROBE: SIGNIFICANT CHANGE UP
HCOV 229E RNA SPEC QL NAA+PROBE: SIGNIFICANT CHANGE UP
HCOV HKU1 RNA SPEC QL NAA+PROBE: SIGNIFICANT CHANGE UP
HCOV NL63 RNA SPEC QL NAA+PROBE: SIGNIFICANT CHANGE UP
HCOV OC43 RNA SPEC QL NAA+PROBE: SIGNIFICANT CHANGE UP
HCT VFR BLD CALC: 41.6 % — SIGNIFICANT CHANGE UP (ref 34.5–45)
HGB BLD-MCNC: 13.9 G/DL — SIGNIFICANT CHANGE UP (ref 11.5–15.5)
HMPV RNA SPEC QL NAA+PROBE: SIGNIFICANT CHANGE UP
HPIV1 RNA SPEC QL NAA+PROBE: SIGNIFICANT CHANGE UP
HPIV2 RNA SPEC QL NAA+PROBE: SIGNIFICANT CHANGE UP
HPIV3 RNA SPEC QL NAA+PROBE: SIGNIFICANT CHANGE UP
HPIV4 RNA SPEC QL NAA+PROBE: SIGNIFICANT CHANGE UP
IANC: 10.13 K/UL — HIGH (ref 1.8–7.4)
IMM GRANULOCYTES NFR BLD AUTO: 0.6 % — SIGNIFICANT CHANGE UP (ref 0–0.9)
KETONES UR-MCNC: ABNORMAL
LEUKOCYTE ESTERASE UR-ACNC: ABNORMAL
LIDOCAIN IGE QN: 16 U/L — SIGNIFICANT CHANGE UP (ref 7–60)
LYMPHOCYTES # BLD AUTO: 1.01 K/UL — SIGNIFICANT CHANGE UP (ref 1–3.3)
LYMPHOCYTES # BLD AUTO: 8.6 % — LOW (ref 13–44)
M PNEUMO DNA SPEC QL NAA+PROBE: SIGNIFICANT CHANGE UP
MCHC RBC-ENTMCNC: 29.9 PG — SIGNIFICANT CHANGE UP (ref 27–34)
MCHC RBC-ENTMCNC: 33.4 GM/DL — SIGNIFICANT CHANGE UP (ref 32–36)
MCV RBC AUTO: 89.5 FL — SIGNIFICANT CHANGE UP (ref 80–100)
MONOCYTES # BLD AUTO: 0.49 K/UL — SIGNIFICANT CHANGE UP (ref 0–0.9)
MONOCYTES NFR BLD AUTO: 4.1 % — SIGNIFICANT CHANGE UP (ref 2–14)
NEUTROPHILS # BLD AUTO: 10.13 K/UL — HIGH (ref 1.8–7.4)
NEUTROPHILS NFR BLD AUTO: 85.8 % — HIGH (ref 43–77)
NITRITE UR-MCNC: POSITIVE
NRBC # BLD: 0 /100 WBCS — SIGNIFICANT CHANGE UP (ref 0–0)
NRBC # FLD: 0 K/UL — SIGNIFICANT CHANGE UP (ref 0–0)
PH UR: 7 — SIGNIFICANT CHANGE UP (ref 5–8)
PLATELET # BLD AUTO: 200 K/UL — SIGNIFICANT CHANGE UP (ref 150–400)
POTASSIUM SERPL-MCNC: 4.8 MMOL/L — SIGNIFICANT CHANGE UP (ref 3.5–5.3)
POTASSIUM SERPL-SCNC: 4.8 MMOL/L — SIGNIFICANT CHANGE UP (ref 3.5–5.3)
PROT SERPL-MCNC: 7.3 G/DL — SIGNIFICANT CHANGE UP (ref 6–8.3)
PROT UR-MCNC: ABNORMAL
RAPID RVP RESULT: SIGNIFICANT CHANGE UP
RBC # BLD: 4.65 M/UL — SIGNIFICANT CHANGE UP (ref 3.8–5.2)
RBC # FLD: 13.4 % — SIGNIFICANT CHANGE UP (ref 10.3–14.5)
RSV RNA SPEC QL NAA+PROBE: SIGNIFICANT CHANGE UP
RV+EV RNA SPEC QL NAA+PROBE: SIGNIFICANT CHANGE UP
SARS-COV-2 RNA SPEC QL NAA+PROBE: SIGNIFICANT CHANGE UP
SODIUM SERPL-SCNC: 140 MMOL/L — SIGNIFICANT CHANGE UP (ref 135–145)
SP GR SPEC: 1.02 — SIGNIFICANT CHANGE UP (ref 1.01–1.05)
UROBILINOGEN FLD QL: SIGNIFICANT CHANGE UP
WBC # BLD: 11.81 K/UL — HIGH (ref 3.8–10.5)
WBC # FLD AUTO: 11.81 K/UL — HIGH (ref 3.8–10.5)

## 2022-09-19 PROCEDURE — 76830 TRANSVAGINAL US NON-OB: CPT | Mod: 26

## 2022-09-19 PROCEDURE — 76770 US EXAM ABDO BACK WALL COMP: CPT | Mod: 26

## 2022-09-19 PROCEDURE — 74177 CT ABD & PELVIS W/CONTRAST: CPT | Mod: 26,MA

## 2022-09-19 PROCEDURE — 99285 EMERGENCY DEPT VISIT HI MDM: CPT

## 2022-09-19 RX ORDER — KETOROLAC TROMETHAMINE 30 MG/ML
15 SYRINGE (ML) INJECTION ONCE
Refills: 0 | Status: DISCONTINUED | OUTPATIENT
Start: 2022-09-19 | End: 2022-09-19

## 2022-09-19 RX ORDER — ONDANSETRON 8 MG/1
4 TABLET, FILM COATED ORAL ONCE
Refills: 0 | Status: COMPLETED | OUTPATIENT
Start: 2022-09-19 | End: 2022-09-19

## 2022-09-19 RX ORDER — SODIUM CHLORIDE 9 MG/ML
1000 INJECTION INTRAMUSCULAR; INTRAVENOUS; SUBCUTANEOUS ONCE
Refills: 0 | Status: COMPLETED | OUTPATIENT
Start: 2022-09-19 | End: 2022-09-19

## 2022-09-19 RX ORDER — CEFTRIAXONE 500 MG/1
1000 INJECTION, POWDER, FOR SOLUTION INTRAMUSCULAR; INTRAVENOUS ONCE
Refills: 0 | Status: COMPLETED | OUTPATIENT
Start: 2022-09-19 | End: 2022-09-19

## 2022-09-19 RX ORDER — ACETAMINOPHEN 500 MG
975 TABLET ORAL ONCE
Refills: 0 | Status: COMPLETED | OUTPATIENT
Start: 2022-09-19 | End: 2022-09-19

## 2022-09-19 RX ORDER — MORPHINE SULFATE 50 MG/1
4 CAPSULE, EXTENDED RELEASE ORAL ONCE
Refills: 0 | Status: DISCONTINUED | OUTPATIENT
Start: 2022-09-19 | End: 2022-09-19

## 2022-09-19 RX ORDER — FAMOTIDINE 10 MG/ML
20 INJECTION INTRAVENOUS ONCE
Refills: 0 | Status: COMPLETED | OUTPATIENT
Start: 2022-09-19 | End: 2022-09-19

## 2022-09-19 RX ADMIN — MORPHINE SULFATE 4 MILLIGRAM(S): 50 CAPSULE, EXTENDED RELEASE ORAL at 20:58

## 2022-09-19 RX ADMIN — Medication 15 MILLIGRAM(S): at 16:13

## 2022-09-19 RX ADMIN — MORPHINE SULFATE 4 MILLIGRAM(S): 50 CAPSULE, EXTENDED RELEASE ORAL at 20:37

## 2022-09-19 RX ADMIN — SODIUM CHLORIDE 1000 MILLILITER(S): 9 INJECTION INTRAMUSCULAR; INTRAVENOUS; SUBCUTANEOUS at 20:03

## 2022-09-19 RX ADMIN — Medication 15 MILLIGRAM(S): at 18:38

## 2022-09-19 RX ADMIN — Medication 1 MILLIGRAM(S): at 18:39

## 2022-09-19 RX ADMIN — Medication 1 MILLIGRAM(S): at 20:03

## 2022-09-19 RX ADMIN — FAMOTIDINE 20 MILLIGRAM(S): 10 INJECTION INTRAVENOUS at 16:13

## 2022-09-19 RX ADMIN — SODIUM CHLORIDE 1000 MILLILITER(S): 9 INJECTION INTRAMUSCULAR; INTRAVENOUS; SUBCUTANEOUS at 16:13

## 2022-09-19 RX ADMIN — Medication 975 MILLIGRAM(S): at 18:38

## 2022-09-19 RX ADMIN — CEFTRIAXONE 100 MILLIGRAM(S): 500 INJECTION, POWDER, FOR SOLUTION INTRAMUSCULAR; INTRAVENOUS at 19:26

## 2022-09-19 RX ADMIN — ONDANSETRON 4 MILLIGRAM(S): 8 TABLET, FILM COATED ORAL at 16:13

## 2022-09-19 NOTE — ED ADULT NURSE NOTE - OBJECTIVE STATEMENT
Pt received to intake 4 , awake and alert, A&OX4, ambulatory. C/o L flank pain that began around 9AM today. States pain is worst to L flank but has intermittent pain to R flank. Both with radiation to lower back. Reporting dysuria and cloudy urine. Also reporting diarrhea. Respirations even and unlabored. Resting comfortably. Denies CP, SOB, N/V, HA, dizziness, palpitations, fatigue, hematuria, bloody stools. . 20G IV placed to RAC. Bed in lowest position, call bell within reach.

## 2022-09-19 NOTE — ED ADULT NURSE REASSESSMENT NOTE - NS ED NURSE REASSESS COMMENT FT1
Pt sitting in wheelchair. Pt noted to be agitated and tearful while being transported to US.  Pt states "I'm in pain and this IV is killing me". IVF disconnected. I asked patient if she wanted to hold off on US so I can change the PIV placement and try and get pain medicine. Pt yelled "So I can wait another 5 fucking hours, no fuck this just take me". Pt taken to US.

## 2022-09-19 NOTE — ED PROVIDER NOTE - PATIENT PORTAL LINK FT
You can access the FollowMyHealth Patient Portal offered by [facility] by registering at the following website: [email or web address]. By joining [employer]’s FollowMyHealth portal, you will also be able to view your health information using other applications (apps) compatible with our system. You can access the FollowMyHealth Patient Portal offered by [facility] by registering at the following website: [email or web address]. By joining [employer]’s FollowMyHealth portal, you will also be able to view your health information using other applications (apps) compatible with our system.

## 2022-09-19 NOTE — ED ADULT TRIAGE NOTE - CHIEF COMPLAINT QUOTE
Pt c/o abdominal pain with N/V/D that started today. Says similar episode happened X 2 months ago. Phx: spinal fusion, anxiety, depression

## 2022-09-19 NOTE — ED PROVIDER NOTE - PROGRESS NOTE DETAILS
JUAN Miller- pt anxious, agitated, yelling at staff. pt states she is having a panic attack, hx of them before. the hospital makes them worse. denies SI. plan to give ativan. pt c/o abd pain that is better however still has it and is severe and is concerned about her ovaries like her last ED visit with a hemorrhagic cyst. plan to check ct and US. parents at bedside and amenable with plan. JUAN Miller- advised pt TVUS wnl, awaiting official CT read, pre osborn reading with no acute findings per radiology. concern for gastroenteritis. pt agitated states she does not think she has gastro, she is scared she will still have pain at home, pt does not want to be admitted for pain control. pt states she does not want to be discharged unless she has a percocet prescription. plan to give pt ativan for agitation. will f/u official ct read. discussed with Dr. Anna. JUAN Miller- pt anxious, agitated, yelling at staff. pt states she is having a panic attack, hx of them before. the hospital makes them worse. denies SI. plan to give ativan, pt amenable with plan. pt c/o abd pain that is better however still has it and is severe and is concerned about her ovaries like her last ED visit with a hemorrhagic cyst. plan to check ct and US. parents at bedside and amenable with plan. discussed with Dr. Tamayo. JUAN Miller- advised pt TVUS wnl, awaiting official CT read, pre osborn CT reading with no acute findings per radiology. concern for gastroenteritis. pt agitated states she does not think she has gastro, she is scared she will still have pain at home, pt does not want to be admitted for pain control. pt does not want to wait for official ct results. pt requesting she be discharged home. pt refusing medication for anxiety, will not sedate for agitation against her will. family at bedside aware of plan. admin team to f/u official ct read. discussed with Dr. Anna. JUAN Miller- advised pt TVUS wnl, awaiting official CT read, pre osborn CT reading with no acute findings per radiology. concern for gastroenteritis. pt agitated states she does not think she has gastro, she is scared she will still have pain at home, pt does not want to be admitted for pain control. pt amenable to take ativan for anxiety/agitation and to wait for official ct results. discussed with Dr. Anna who received sign out. Rec'd signout on this pt from Dr. Tamayo:  Dispo per CT result. CT neg. Discussed all lab results w/ patient, meds to take, outpt GI and GYN f/u, as well as pain mngmt f/u if necessary.  -Dr. Anna
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
## 2022-09-19 NOTE — ED PROVIDER NOTE - NSFOLLOWUPINSTRUCTIONS_ED_ALL_ED_FT
Follow up with your PMD within 48-72 hours.  Rest, increase fluids. Take tylenol 650mg every 6 hours for pain or temp greater than 99.9. Take Pepcid 20mg 1 tab 2x/day for 1-2 days as needed for epigastric burning.  Eat bland, small meals as tolerated.    Take Motrin 600mg every 8hrs with food for pain.       Worsening or continued fever, chills, weakness, nausea, vomiting, abdominal pain return to the emergency department. Follow up with your PMD within 48-72 hours.  Rest, increase fluids. Take tylenol 650mg every 6 hours for pain or temp greater than 99.9. Take Pepcid 20mg 1 tab 2x/day for 1-2 days as needed for epigastric burning.  Eat bland, small meals as tolerated.    Take Motrin 600mg every 8hrs with food for pain.     Take antibiotics for UTI    Worsening or continued fever, chills, weakness, nausea, vomiting, abdominal pain return to the emergency department. Follow up with your PMD within 48-72 hours.  Rest, increase fluids. Take tylenol 650mg every 6 hours for pain or temp greater than 99.9. Take Pepcid 20mg 1 tab 2x/day for 1-2 days as needed for epigastric burning.  Eat bland, small meals as tolerated.    Take Motrin 600mg every 8hrs with food for pain.     Take antibiotics for UTI    Worsening or continued fever, chills, weakness, nausea, vomiting, abdominal pain return to the emergency department.    Call the number listed below to schedule appointment with pain management specialist:    Address: [facility] [place] [address] [address]  Hours:   Closed · Opens 9AM  Phone: [phone]

## 2022-09-19 NOTE — ED PROVIDER NOTE - CLINICAL SUMMARY MEDICAL DECISION MAKING FREE TEXT BOX
26 y/o female with pmhx of splenic lesion on CT 1.4cm, 1.2cm left ovarian cyst, E. Coli UTI, borderline personality disorder, anxiety, presents to ED c/o diffuse abd pain, N/V/D x 1 day. Pt states she woke up with gradual onset diffuse abd pain, thought it would go away. Pain got progressively worse associated with 6 episodes of nb vomiting and 3 episodes of watery diarrhea. Pt think she has a UTI because her urine is cloudy, hx of them in the past. No fevers. +chills. No sick contacts or travel hx. No dysuria. +smokes marijuana daily. on exam pt tearful, anxious, abd soft and NT. likely gastroenteritis. plan to check labs, ua r.o UTI, pain control, hydrate, reassess.

## 2022-09-20 VITALS
TEMPERATURE: 98 F | RESPIRATION RATE: 18 BRPM | HEART RATE: 98 BPM | DIASTOLIC BLOOD PRESSURE: 60 MMHG | SYSTOLIC BLOOD PRESSURE: 102 MMHG | OXYGEN SATURATION: 100 %

## 2022-09-20 RX ORDER — CEFPODOXIME PROXETIL 100 MG
1 TABLET ORAL
Qty: 14 | Refills: 0
Start: 2022-09-20 | End: 2022-09-26

## 2022-09-20 RX ADMIN — Medication 20 MILLIGRAM(S): at 00:20

## 2022-09-20 RX ADMIN — Medication 2 MILLIGRAM(S): at 00:20

## 2022-10-10 NOTE — ED PROVIDER NOTE - CPE EDP RESP NORM
[FreeTextEntry1] : 10/10/2022 \par EMILIA SIM  is a 13 year old male who presents today for initial evaluation of ADD/ADHD\par \par History: In 7th grade gen ed class. Difficulty with staying focused, staying on task. Disorganized. Academically, doing well. \par Never seen by neuropsych/developmental peds\par Developmental hx: speech delay\par Family hx of developmental delays/ADD/ADHD: father with possible ADHD\par Other coexisting behaviors? \par -Mood disorder/ depression:-\par -Anxiety: -\par \par Social: He has friends in school. He gets along well with peers. \par Eating: He eats a varied diet. \par Sleep: He has a hard time falling asleep.\par Play: Likes to play video games.\par 
normal...

## 2022-11-01 NOTE — ED PROVIDER NOTE - NS ED MD EM SELECTION
Patient was prescribed Xarelto on Oct 31    Thank you for letting us know about AFib   [id number] Detailed

## 2022-11-03 ENCOUNTER — ASOB RESULT (OUTPATIENT)
Age: 27
End: 2022-11-03

## 2022-11-03 ENCOUNTER — APPOINTMENT (OUTPATIENT)
Dept: OBGYN | Facility: CLINIC | Age: 27
End: 2022-11-03

## 2022-11-03 VITALS
HEART RATE: 96 BPM | SYSTOLIC BLOOD PRESSURE: 115 MMHG | WEIGHT: 132 LBS | HEIGHT: 63 IN | BODY MASS INDEX: 23.39 KG/M2 | DIASTOLIC BLOOD PRESSURE: 76 MMHG

## 2022-11-03 DIAGNOSIS — L50.8 OTHER URTICARIA: ICD-10-CM

## 2022-11-03 DIAGNOSIS — Z87.898 PERSONAL HISTORY OF OTHER SPECIFIED CONDITIONS: ICD-10-CM

## 2022-11-03 DIAGNOSIS — R82.90 UNSPECIFIED ABNORMAL FINDINGS IN URINE: ICD-10-CM

## 2022-11-03 DIAGNOSIS — Z11.3 ENCOUNTER FOR SCREENING FOR INFECTIONS WITH A PREDOMINANTLY SEXUAL MODE OF TRANSMISSION: ICD-10-CM

## 2022-11-03 DIAGNOSIS — O36.80X0 PREGNANCY WITH INCONCLUSIVE FETAL VIABILITY, NOT APPLICABLE OR UNSPECIFIED: ICD-10-CM

## 2022-11-03 DIAGNOSIS — R10.30 LOWER ABDOMINAL PAIN, UNSPECIFIED: ICD-10-CM

## 2022-11-03 DIAGNOSIS — A74.9 CHLAMYDIAL INFECTION, UNSPECIFIED: ICD-10-CM

## 2022-11-03 DIAGNOSIS — Z87.42 PERSONAL HISTORY OF OTHER DISEASES OF THE FEMALE GENITAL TRACT: ICD-10-CM

## 2022-11-03 PROCEDURE — 99215 OFFICE O/P EST HI 40 MIN: CPT

## 2022-11-03 PROCEDURE — 76817 TRANSVAGINAL US OBSTETRIC: CPT

## 2022-11-03 NOTE — PHYSICAL EXAM

## 2022-11-04 PROBLEM — A74.9 CHLAMYDIA: Status: RESOLVED | Noted: 2018-11-15 | Resolved: 2022-11-04

## 2022-11-04 PROBLEM — Z87.42 HISTORY OF VAGINAL DISCHARGE: Status: RESOLVED | Noted: 2019-12-10 | Resolved: 2022-11-04

## 2022-11-04 PROBLEM — R82.90 CLOUDY URINE: Status: RESOLVED | Noted: 2022-03-01 | Resolved: 2022-11-04

## 2022-11-04 PROBLEM — Z87.898 HISTORY OF BRUISING EASILY: Status: RESOLVED | Noted: 2021-04-28 | Resolved: 2022-11-04

## 2022-11-04 PROBLEM — L50.8 ACUTE URTICARIA: Status: RESOLVED | Noted: 2019-02-11 | Resolved: 2022-11-04

## 2022-11-04 PROBLEM — Z87.898 HISTORY OF DIZZINESS: Status: RESOLVED | Noted: 2017-01-26 | Resolved: 2022-11-04

## 2022-11-04 PROBLEM — R10.30 LOWER ABDOMINAL PAIN: Status: RESOLVED | Noted: 2017-01-26 | Resolved: 2022-11-04

## 2022-11-04 PROBLEM — Z11.3 SCREEN FOR STD (SEXUALLY TRANSMITTED DISEASE): Status: RESOLVED | Noted: 2021-12-10 | Resolved: 2022-11-04

## 2022-11-04 LAB
ABO + RH PNL BLD: NORMAL
ALBUMIN SERPL ELPH-MCNC: 4.8 G/DL
ALP BLD-CCNC: 45 U/L
ALT SERPL-CCNC: 11 U/L
ANION GAP SERPL CALC-SCNC: 17 MMOL/L
AST SERPL-CCNC: 16 U/L
BASOPHILS # BLD AUTO: 0.06 K/UL
BASOPHILS NFR BLD AUTO: 0.8 %
BILIRUB SERPL-MCNC: 0.6 MG/DL
BLD GP AB SCN SERPL QL: NORMAL
BUN SERPL-MCNC: 13 MG/DL
C TRACH RRNA SPEC QL NAA+PROBE: NOT DETECTED
CALCIUM SERPL-MCNC: 9.9 MG/DL
CHLORIDE SERPL-SCNC: 99 MMOL/L
CO2 SERPL-SCNC: 22 MMOL/L
CREAT SERPL-MCNC: 0.55 MG/DL
EGFR: 130 ML/MIN/1.73M2
EOSINOPHIL # BLD AUTO: 0.07 K/UL
EOSINOPHIL NFR BLD AUTO: 1 %
ESTIMATED AVERAGE GLUCOSE: 94 MG/DL
GLUCOSE SERPL-MCNC: 63 MG/DL
HBA1C MFR BLD HPLC: 4.9 %
HBV SURFACE AG SER QL: NONREACTIVE
HCT VFR BLD CALC: 39.2 %
HCV AB SER QL: NONREACTIVE
HCV S/CO RATIO: 0.08 S/CO
HGB A MFR BLD: 97.5 %
HGB A2 MFR BLD: 2.5 %
HGB BLD-MCNC: 13.5 G/DL
HGB FRACT BLD-IMP: NORMAL
HIV1+2 AB SPEC QL IA.RAPID: NONREACTIVE
IMM GRANULOCYTES NFR BLD AUTO: 0.3 %
LYMPHOCYTES # BLD AUTO: 1.65 K/UL
LYMPHOCYTES NFR BLD AUTO: 23.2 %
MAN DIFF?: NORMAL
MCHC RBC-ENTMCNC: 30.5 PG
MCHC RBC-ENTMCNC: 34.4 GM/DL
MCV RBC AUTO: 88.7 FL
MONOCYTES # BLD AUTO: 0.38 K/UL
MONOCYTES NFR BLD AUTO: 5.3 %
N GONORRHOEA RRNA SPEC QL NAA+PROBE: NOT DETECTED
NEUTROPHILS # BLD AUTO: 4.94 K/UL
NEUTROPHILS NFR BLD AUTO: 69.4 %
PLATELET # BLD AUTO: 241 K/UL
POTASSIUM SERPL-SCNC: 3.4 MMOL/L
PROT SERPL-MCNC: 7 G/DL
RBC # BLD: 4.42 M/UL
RBC # FLD: 12.7 %
SODIUM SERPL-SCNC: 138 MMOL/L
SOURCE AMPLIFICATION: NORMAL
T PALLIDUM AB SER QL IA: NEGATIVE
TSH SERPL-ACNC: 1.32 UIU/ML
WBC # FLD AUTO: 7.12 K/UL

## 2022-11-04 RX ORDER — CEFPODOXIME PROXETIL 200 MG/1
200 TABLET, FILM COATED ORAL
Qty: 14 | Refills: 0 | Status: DISCONTINUED | COMMUNITY
Start: 2022-09-20

## 2022-11-04 RX ORDER — NITROFURANTOIN (MONOHYDRATE/MACROCRYSTALS) 25; 75 MG/1; MG/1
100 CAPSULE ORAL
Qty: 14 | Refills: 0 | Status: DISCONTINUED | COMMUNITY
Start: 2022-03-04 | End: 2022-11-04

## 2022-11-04 RX ORDER — FLUVOXAMINE MALEATE 25 MG/1
25 TABLET, FILM COATED ORAL
Qty: 30 | Refills: 0 | Status: COMPLETED | COMMUNITY
Start: 2022-08-04

## 2022-11-04 RX ORDER — PROPRANOLOL HYDROCHLORIDE 80 MG/1
TABLET ORAL
Refills: 0 | Status: DISCONTINUED | COMMUNITY
End: 2022-11-04

## 2022-11-04 RX ORDER — SULFAMETHOXAZOLE AND TRIMETHOPRIM 800; 160 MG/1; MG/1
800-160 TABLET ORAL
Qty: 14 | Refills: 0 | Status: COMPLETED | COMMUNITY
Start: 2022-10-24

## 2022-11-04 RX ORDER — OXYCODONE AND ACETAMINOPHEN 5; 325 MG/1; MG/1
5-325 TABLET ORAL
Qty: 16 | Refills: 0 | Status: COMPLETED | COMMUNITY
Start: 2022-06-25

## 2022-11-04 NOTE — HISTORY OF PRESENT ILLNESS
[Patient reported PAP Smear was normal] : Patient reported PAP Smear was normal [PapSmeardate] : 05/2021 [LMPDate] : 09/26/22 [FreeTextEntry1] : 09/26/22
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
## 2022-11-06 LAB
BACTERIA UR CULT: NORMAL
LEAD BLD-MCNC: <1 UG/DL
MEV IGG FLD QL IA: 158 AU/ML
MEV IGG+IGM SER-IMP: POSITIVE
RUBV IGG FLD-ACNC: 1.2 INDEX
RUBV IGG SER-IMP: POSITIVE
T GONDII AB SER-IMP: NEGATIVE
T GONDII AB SER-IMP: POSITIVE
T GONDII IGG SER QL: 50.4 IU/ML
T GONDII IGM SER QL: <3 AU/ML
VZV AB TITR SER: POSITIVE
VZV IGG SER IF-ACNC: 2022 INDEX

## 2022-11-10 ENCOUNTER — NON-APPOINTMENT (OUTPATIENT)
Age: 27
End: 2022-11-10

## 2022-11-10 LAB
AR GENE MUT ANL BLD/T: NORMAL
B19V IGG SER QL IA: 7.95 INDEX
B19V IGG+IGM SER-IMP: NORMAL
B19V IGG+IGM SER-IMP: POSITIVE
B19V IGM FLD-ACNC: 0.34 INDEX
B19V IGM SER-ACNC: NEGATIVE
CYTOLOGY CVX/VAG DOC THIN PREP: NORMAL
FMR1 GENE MUT ANL BLD/T: NORMAL

## 2022-11-13 LAB — CFTR MUT TESTED BLD/T: NEGATIVE

## 2022-11-28 ENCOUNTER — NON-APPOINTMENT (OUTPATIENT)
Age: 27
End: 2022-11-28

## 2022-12-01 ENCOUNTER — APPOINTMENT (OUTPATIENT)
Dept: OBGYN | Facility: CLINIC | Age: 27
End: 2022-12-01

## 2022-12-01 VITALS
WEIGHT: 140 LBS | BODY MASS INDEX: 24.8 KG/M2 | HEIGHT: 63 IN | DIASTOLIC BLOOD PRESSURE: 76 MMHG | SYSTOLIC BLOOD PRESSURE: 125 MMHG

## 2022-12-01 DIAGNOSIS — N89.8 OTHER SPECIFIED NONINFLAMMATORY DISORDERS OF VAGINA: ICD-10-CM

## 2022-12-01 DIAGNOSIS — Z34.91 ENCOUNTER FOR SUPERVISION OF NORMAL PREGNANCY, UNSPECIFIED, FIRST TRIMESTER: ICD-10-CM

## 2022-12-01 PROCEDURE — 0501F PRENATAL FLOW SHEET: CPT

## 2022-12-06 ENCOUNTER — TRANSCRIPTION ENCOUNTER (OUTPATIENT)
Age: 27
End: 2022-12-06

## 2022-12-06 LAB
CANDIDA VAG CYTO: NOT DETECTED
G VAGINALIS+PREV SP MTYP VAG QL MICRO: NOT DETECTED
T VAGINALIS VAG QL WET PREP: NOT DETECTED

## 2022-12-14 ENCOUNTER — ASOB RESULT (OUTPATIENT)
Age: 27
End: 2022-12-14

## 2022-12-14 ENCOUNTER — APPOINTMENT (OUTPATIENT)
Dept: ANTEPARTUM | Facility: CLINIC | Age: 27
End: 2022-12-14

## 2022-12-14 PROCEDURE — 76813 OB US NUCHAL MEAS 1 GEST: CPT

## 2022-12-14 PROCEDURE — 76801 OB US < 14 WKS SINGLE FETUS: CPT

## 2022-12-19 ENCOUNTER — NON-APPOINTMENT (OUTPATIENT)
Age: 27
End: 2022-12-19

## 2022-12-19 ENCOUNTER — EMERGENCY (EMERGENCY)
Facility: HOSPITAL | Age: 27
LOS: 1 days | Discharge: HOME | End: 2022-12-19
Attending: EMERGENCY MEDICINE | Admitting: EMERGENCY MEDICINE

## 2022-12-19 VITALS
TEMPERATURE: 98 F | RESPIRATION RATE: 15 BRPM | HEART RATE: 105 BPM | SYSTOLIC BLOOD PRESSURE: 115 MMHG | OXYGEN SATURATION: 100 % | DIASTOLIC BLOOD PRESSURE: 62 MMHG

## 2022-12-19 DIAGNOSIS — Z98.890 OTHER SPECIFIED POSTPROCEDURAL STATES: Chronic | ICD-10-CM

## 2022-12-19 PROCEDURE — 93010 ELECTROCARDIOGRAM REPORT: CPT | Mod: NC

## 2022-12-19 PROCEDURE — 99285 EMERGENCY DEPT VISIT HI MDM: CPT

## 2022-12-19 NOTE — ED ADULT TRIAGE NOTE - CHIEF COMPLAINT QUOTE
Pt  12wks pregnant  worsening nausea & vomiting x 6 weeks. Pt sent by OB for hydration. Endorses decreased po intake. Denies back pain, vaginal bleed, abdominal pain.  PMHx: Borderline Personality Disorder, Spinal Fusion

## 2022-12-20 VITALS
HEART RATE: 91 BPM | RESPIRATION RATE: 16 BRPM | OXYGEN SATURATION: 100 % | DIASTOLIC BLOOD PRESSURE: 61 MMHG | TEMPERATURE: 98 F | SYSTOLIC BLOOD PRESSURE: 103 MMHG

## 2022-12-20 LAB
ALBUMIN SERPL ELPH-MCNC: 4.2 G/DL — SIGNIFICANT CHANGE UP (ref 3.3–5)
ALP SERPL-CCNC: 42 U/L — SIGNIFICANT CHANGE UP (ref 40–120)
ALT FLD-CCNC: 12 U/L — SIGNIFICANT CHANGE UP (ref 4–33)
ANION GAP SERPL CALC-SCNC: 10 MMOL/L — SIGNIFICANT CHANGE UP (ref 7–14)
APPEARANCE UR: CLEAR — SIGNIFICANT CHANGE UP
APTT BLD: 26.4 SEC — LOW (ref 27–36.3)
AST SERPL-CCNC: 14 U/L — SIGNIFICANT CHANGE UP (ref 4–32)
BACTERIA # UR AUTO: ABNORMAL
BASE EXCESS BLDV CALC-SCNC: 0.3 MMOL/L — SIGNIFICANT CHANGE UP (ref -2–3)
BASOPHILS # BLD AUTO: 0.04 K/UL — SIGNIFICANT CHANGE UP (ref 0–0.2)
BASOPHILS NFR BLD AUTO: 0.4 % — SIGNIFICANT CHANGE UP (ref 0–2)
BILIRUB SERPL-MCNC: 0.4 MG/DL — SIGNIFICANT CHANGE UP (ref 0.2–1.2)
BILIRUB UR-MCNC: NEGATIVE — SIGNIFICANT CHANGE UP
BLOOD GAS VENOUS COMPREHENSIVE RESULT: SIGNIFICANT CHANGE UP
BUN SERPL-MCNC: 13 MG/DL — SIGNIFICANT CHANGE UP (ref 7–23)
CALCIUM SERPL-MCNC: 9.5 MG/DL — SIGNIFICANT CHANGE UP (ref 8.4–10.5)
CHLORIDE BLDV-SCNC: 101 MMOL/L — SIGNIFICANT CHANGE UP (ref 96–108)
CHLORIDE SERPL-SCNC: 99 MMOL/L — SIGNIFICANT CHANGE UP (ref 98–107)
CO2 BLDV-SCNC: 27.4 MMOL/L — HIGH (ref 22–26)
CO2 SERPL-SCNC: 24 MMOL/L — SIGNIFICANT CHANGE UP (ref 22–31)
COLOR SPEC: YELLOW — SIGNIFICANT CHANGE UP
CREAT SERPL-MCNC: 0.48 MG/DL — LOW (ref 0.5–1.3)
DIFF PNL FLD: NEGATIVE — SIGNIFICANT CHANGE UP
EGFR: 135 ML/MIN/1.73M2 — SIGNIFICANT CHANGE UP
EOSINOPHIL # BLD AUTO: 0.12 K/UL — SIGNIFICANT CHANGE UP (ref 0–0.5)
EOSINOPHIL NFR BLD AUTO: 1.3 % — SIGNIFICANT CHANGE UP (ref 0–6)
EPI CELLS # UR: 6 /HPF — HIGH (ref 0–5)
GAS PNL BLDV: 134 MMOL/L — LOW (ref 136–145)
GLUCOSE BLDV-MCNC: 83 MG/DL — SIGNIFICANT CHANGE UP (ref 70–99)
GLUCOSE SERPL-MCNC: 82 MG/DL — SIGNIFICANT CHANGE UP (ref 70–99)
GLUCOSE UR QL: NEGATIVE — SIGNIFICANT CHANGE UP
HCG SERPL-ACNC: SIGNIFICANT CHANGE UP MIU/ML
HCO3 BLDV-SCNC: 26 MMOL/L — SIGNIFICANT CHANGE UP (ref 22–29)
HCT VFR BLD CALC: 39.8 % — SIGNIFICANT CHANGE UP (ref 34.5–45)
HCT VFR BLDA CALC: 43 % — SIGNIFICANT CHANGE UP (ref 34.5–46.5)
HGB BLD CALC-MCNC: 14.2 G/DL — SIGNIFICANT CHANGE UP (ref 11.5–15.5)
HGB BLD-MCNC: 13.8 G/DL — SIGNIFICANT CHANGE UP (ref 11.5–15.5)
HYALINE CASTS # UR AUTO: 1 /LPF — SIGNIFICANT CHANGE UP (ref 0–7)
IANC: 6.41 K/UL — SIGNIFICANT CHANGE UP (ref 1.8–7.4)
IMM GRANULOCYTES NFR BLD AUTO: 0.4 % — SIGNIFICANT CHANGE UP (ref 0–0.9)
INR BLD: 0.93 RATIO — SIGNIFICANT CHANGE UP (ref 0.88–1.16)
KETONES UR-MCNC: ABNORMAL
LACTATE BLDV-MCNC: 1.4 MMOL/L — SIGNIFICANT CHANGE UP (ref 0.5–2)
LEUKOCYTE ESTERASE UR-ACNC: NEGATIVE — SIGNIFICANT CHANGE UP
LYMPHOCYTES # BLD AUTO: 2.29 K/UL — SIGNIFICANT CHANGE UP (ref 1–3.3)
LYMPHOCYTES # BLD AUTO: 24.3 % — SIGNIFICANT CHANGE UP (ref 13–44)
MAGNESIUM SERPL-MCNC: 2.1 MG/DL — SIGNIFICANT CHANGE UP (ref 1.6–2.6)
MCHC RBC-ENTMCNC: 30.3 PG — SIGNIFICANT CHANGE UP (ref 27–34)
MCHC RBC-ENTMCNC: 34.7 GM/DL — SIGNIFICANT CHANGE UP (ref 32–36)
MCV RBC AUTO: 87.5 FL — SIGNIFICANT CHANGE UP (ref 80–100)
MONOCYTES # BLD AUTO: 0.51 K/UL — SIGNIFICANT CHANGE UP (ref 0–0.9)
MONOCYTES NFR BLD AUTO: 5.4 % — SIGNIFICANT CHANGE UP (ref 2–14)
NEUTROPHILS # BLD AUTO: 6.41 K/UL — SIGNIFICANT CHANGE UP (ref 1.8–7.4)
NEUTROPHILS NFR BLD AUTO: 68.2 % — SIGNIFICANT CHANGE UP (ref 43–77)
NITRITE UR-MCNC: NEGATIVE — SIGNIFICANT CHANGE UP
NRBC # BLD: 0 /100 WBCS — SIGNIFICANT CHANGE UP (ref 0–0)
NRBC # FLD: 0 K/UL — SIGNIFICANT CHANGE UP (ref 0–0)
PCO2 BLDV: 45 MMHG — HIGH (ref 39–42)
PH BLDV: 7.37 — SIGNIFICANT CHANGE UP (ref 7.32–7.43)
PH UR: 6.5 — SIGNIFICANT CHANGE UP (ref 5–8)
PHOSPHATE SERPL-MCNC: 3.7 MG/DL — SIGNIFICANT CHANGE UP (ref 2.5–4.5)
PLATELET # BLD AUTO: 263 K/UL — SIGNIFICANT CHANGE UP (ref 150–400)
PO2 BLDV: 41 MMHG — SIGNIFICANT CHANGE UP
POTASSIUM BLDV-SCNC: 3.7 MMOL/L — SIGNIFICANT CHANGE UP (ref 3.5–5.1)
POTASSIUM SERPL-MCNC: 3.6 MMOL/L — SIGNIFICANT CHANGE UP (ref 3.5–5.3)
POTASSIUM SERPL-SCNC: 3.6 MMOL/L — SIGNIFICANT CHANGE UP (ref 3.5–5.3)
PROT SERPL-MCNC: 6.9 G/DL — SIGNIFICANT CHANGE UP (ref 6–8.3)
PROT UR-MCNC: ABNORMAL
PROTHROM AB SERPL-ACNC: 10.8 SEC — SIGNIFICANT CHANGE UP (ref 10.5–13.4)
RBC # BLD: 4.55 M/UL — SIGNIFICANT CHANGE UP (ref 3.8–5.2)
RBC # FLD: 13.1 % — SIGNIFICANT CHANGE UP (ref 10.3–14.5)
RBC CASTS # UR COMP ASSIST: 1 /HPF — SIGNIFICANT CHANGE UP (ref 0–4)
SAO2 % BLDV: 66.2 % — SIGNIFICANT CHANGE UP
SODIUM SERPL-SCNC: 133 MMOL/L — LOW (ref 135–145)
SP GR SPEC: 1.03 — SIGNIFICANT CHANGE UP (ref 1.01–1.05)
UROBILINOGEN FLD QL: SIGNIFICANT CHANGE UP
WBC # BLD: 9.41 K/UL — SIGNIFICANT CHANGE UP (ref 3.8–10.5)
WBC # FLD AUTO: 9.41 K/UL — SIGNIFICANT CHANGE UP (ref 3.8–10.5)
WBC UR QL: 4 /HPF — SIGNIFICANT CHANGE UP (ref 0–5)

## 2022-12-20 RX ORDER — ONDANSETRON 8 MG/1
4 TABLET, FILM COATED ORAL ONCE
Refills: 0 | Status: COMPLETED | OUTPATIENT
Start: 2022-12-20 | End: 2022-12-20

## 2022-12-20 RX ORDER — FAMOTIDINE 10 MG/ML
20 INJECTION INTRAVENOUS ONCE
Refills: 0 | Status: COMPLETED | OUTPATIENT
Start: 2022-12-20 | End: 2022-12-20

## 2022-12-20 RX ORDER — SODIUM CHLORIDE 9 MG/ML
1000 INJECTION INTRAMUSCULAR; INTRAVENOUS; SUBCUTANEOUS ONCE
Refills: 0 | Status: COMPLETED | OUTPATIENT
Start: 2022-12-20 | End: 2022-12-20

## 2022-12-20 RX ORDER — METOCLOPRAMIDE HCL 10 MG
10 TABLET ORAL
Qty: 60 | Refills: 0
Start: 2022-12-20 | End: 2022-12-21

## 2022-12-20 RX ORDER — METOCLOPRAMIDE HCL 10 MG
10 TABLET ORAL ONCE
Refills: 0 | Status: COMPLETED | OUTPATIENT
Start: 2022-12-20 | End: 2022-12-20

## 2022-12-20 RX ORDER — METOCLOPRAMIDE HCL 10 MG
1 TABLET ORAL
Qty: 15 | Refills: 0
Start: 2022-12-20 | End: 2022-12-24

## 2022-12-20 RX ADMIN — SODIUM CHLORIDE 1000 MILLILITER(S): 9 INJECTION INTRAMUSCULAR; INTRAVENOUS; SUBCUTANEOUS at 02:55

## 2022-12-20 RX ADMIN — ONDANSETRON 4 MILLIGRAM(S): 8 TABLET, FILM COATED ORAL at 00:35

## 2022-12-20 RX ADMIN — FAMOTIDINE 20 MILLIGRAM(S): 10 INJECTION INTRAVENOUS at 00:35

## 2022-12-20 RX ADMIN — SODIUM CHLORIDE 1000 MILLILITER(S): 9 INJECTION INTRAMUSCULAR; INTRAVENOUS; SUBCUTANEOUS at 00:35

## 2022-12-20 RX ADMIN — Medication 30 MILLILITER(S): at 00:35

## 2022-12-20 RX ADMIN — Medication 10 MILLIGRAM(S): at 02:55

## 2022-12-20 NOTE — ED PROVIDER NOTE - PROGRESS NOTE DETAILS
Pt w/persistent nausea/vomiting after zofran, will trial reglan and reassess. - Bill Solomon, PGY-2 Pt feeling improved, no nausea/vomiting for 2hrs post reglan, OK for DCTH. Pt stable, agreeable to discharge. Discussed results of workup, importance of follow-up with OB/gyn for hyperemesis gravidarum management, reglan for nausea/vomiting, and return precautions with patient who verbalized understanding and agreement. Pt had opportunity to ask questions and address concerns, and results of workup including lab and imaging were provided in DC paperwork. Patient is medically clear for DC at this time. -Bill Solomon, PGY-2

## 2022-12-20 NOTE — ED PROVIDER NOTE - ATTENDING CONTRIBUTION TO CARE
I performed a face-to-face evaluation of the patient and performed a history and physical examination along with the resident or ACP, and/or medical student above.  I agree with the history and physical examination as documented by the resident or ACP, and/or medical student above.  Shoshana:  27-year-old female,  at approximately 12 weeks gestation (confirmed by outpatient ultrasound),   With past medical history of frequent UTIs, presents for evaluation of intractable nausea and vomiting times approximately 6 weeks.  Patient states that she was initially on diet changes with little to no effect, transition to Zofran a week ago (also without relief of symptoms), now reports vomiting every 30 minutes and general weakness.  Denies lightheadedness, chest pain, palpitations, shortness of breath.  Denies vaginal bleeding/spotting or pelvic pain.  On ROS, does report slight urinary frequency. patient is mildly tachycardic, and clinically dehydrated.  Fetal heart rate is 162.  Labs, fluids, meds for symptomatic relief, reassess.

## 2022-12-20 NOTE — ED PROVIDER NOTE - OBJECTIVE STATEMENT
28yo  F @ 12w p/w acute on chronic nausea/vomiting. Pt has had nausea/vomiting for 6 weeks which has acutely worsened over the last 4d, now vomiting every 25-30 min, waking up from sleep to vomit, trialed diclegis and zofran outpt w/OB without relief, now sent here by OB due to concern for dehydration. Pt reports being able to tolerate PO despite constant vomiting. Denies HA, SOB, CP, abd pain, vaginal bleeding/cramping, fever/chills, dysuria/hematuria, hematochezia/melena, numbness/tingling, focal weakness, dizziness/lightheadedness. Next OB appt .     OB: Lulú Yun

## 2022-12-20 NOTE — ED PROVIDER NOTE - NS ED ROS FT
GENERAL: No fever or chills, EYES: no change in vision, HEENT: no trouble swallowing or speaking, CARDIAC: no chest pain, PULMONARY: no cough or SOB, GI: no abdominal pain, +nausea, +vomiting, no diarrhea or constipation, : No changes in urination, SKIN: no rashes, NEURO: no headache,  MSK: No joint pain     All other ROS negative unless otherwise specified in HPI.     ~Bill Solomon M.D. Resident

## 2022-12-20 NOTE — ED PROVIDER NOTE - NSFOLLOWUPINSTRUCTIONS_ED_ALL_ED_FT
Please follow up with your OBGYN within 1 week. Bring copies of your results with you (provided in your discharge paperwork). You have been diagnosed with HYPEREMESIS GRAVIDARUM.     Please take the following medication as prescribed: reglan (metoclopramide), 1 tab, every 8 hours, AS NEEDED for nausea/vomiting. Please DO NOT CONTINUE ZOFRAN while taking this medication.    You may take 500-1000 mg acetaminophen (tylenol) every 6 hours, as needed for pain.    WHAT YOU NEED TO KNOW:  Hyperemesis gravidarum is a severe form of nausea and vomiting that happens during pregnancy. Hyperemesis is more severe than morning sickness. It may cause you to have nausea or vomiting all day for many days. It may also keep you from getting enough food and liquid.    DISCHARGE INSTRUCTIONS:    Return to the emergency department if:   •You have signs of severe dehydration including little to no urine and dry mouth or lips.  •You have severe stomach pain.  •You feel too weak or dizzy to stand up.  •You see blood in your vomit or bowel movements.    Contact your healthcare provider if:   •You cannot keep any food or liquid down.  •You are losing weight.  •You have a fever.  •You have questions or concerns about your condition or care.    Medicines:   •Medicines, vitamins, or supplements may be given to help decrease nausea and vomiting.  •Take your medicine as directed. Contact your healthcare provider if you think your medicine is not helping or if you have side effects. Tell your provider if you are allergic to any medicine. Keep a list of the medicines, vitamins, and herbs you take. Include the amounts, and when and why you take them. Bring the list or the pill bottles to follow-up visits. Carry your medicine list with you in case of an emergency.    Manage your symptoms:   •Eat small amounts of food every 1 to 2 hours. Some examples of good foods to eat include broth, toast, fruit, eggs, gelatin, or cottage cheese. Do not eat spicy or high-fat foods. Try to eat crackers before getting out of bed each morning. Foods and drinks with ginger, such as ginger ale, may help to decrease nausea and vomiting.  •Drink liquids as directed. You may need to drink small amounts of liquid often to prevent dehydration. Ask how much liquid to drink each day and which liquids are best for you.   •Rest when you need to. Start activity slowly and work up to your usual routine as you start to feel better.  •Avoid things that may make hyperemesis worse. Avoid odors, heat, and humidity. Limit noise and flickering lights.   •Weigh yourself daily if directed by your healthcare provider. You may need to keep a record of your daily weights for your healthcare provider. He or she may want to make sure you are not losing too much weight.    Follow up with your doctor as directed: Write down your questions so you remember to ask them during your visits.

## 2022-12-20 NOTE — ED ADULT NURSE NOTE - OBJECTIVE STATEMENT
RN notified Christian, midwife 
Pt received in room 10B, alert & awake, A&OX4. Pt sent by OB for excessive n/v. Pt is currently 12 weeks pregnant, confirmed by US, . Pt states she has been vomiting since 6 weeks of pregnancy and has worsened to the point of vomiting every 20-30 minutes @ a time. Pt denies vaginal bleeding, abd pain, c/p, sob, fever/chills. IV 18G, L AC, labs collected & sent, meds given, will continue to monitor

## 2022-12-20 NOTE — ED PROVIDER NOTE - PATIENT PORTAL LINK FT
You can access the FollowMyHealth Patient Portal offered by [facility] by registering at the following website: [email or web address]. By joining [facility]’s FollowMyHealth portal, you will also be able to view your health information using other applications (apps) compatible with our system.

## 2022-12-20 NOTE — ED ADULT NURSE NOTE - NSFALLRSKASSESSTYPE_ED_ALL_ED
Pt brought to ED by son c/o severe abdominal pain and AMS. Son endorses pt is having visual hallucinations, ambulates with a walker at home. Per son, pt was seen here x1 week ago and was dx with constipation. Initial (On Arrival)

## 2022-12-20 NOTE — ED PROVIDER NOTE - PHYSICAL EXAMINATION
Gen: AAOx3, non-toxic WDWN young woman lying in bed in NAD  Head: NCAT  HEENT: EOMI, oral mucosa dry, normal conjunctiva  Lung: CTAB, no respiratory distress, no wheezes/rhonchi/rales B/L, speaking in full sentences  CV: RRR, no murmurs, rubs or gallops  Abd: soft, NTND, no guarding, no CVA tenderness, +gravid uterus; +  MSK: no visible deformities  Neuro: No focal sensory or motor deficits  Skin: Warm, well perfused, no rash, no edema  Psych: pleasant, normal affect.   ~Bill Solomon M.D. Resident

## 2022-12-20 NOTE — ED PROVIDER NOTE - CLINICAL SUMMARY MEDICAL DECISION MAKING FREE TEXT BOX
28yo  F @ 12w gestation p/w acute on chronic nausea/vomiting for 6 weeks now q30min for last 4d, vs significant for tachycardia, phys exam w/dry MMs otherwise WNL no abd tenderness/pain, no vaginal bleeding/cramping, FHR WNL. Likely 2/2 hyperemesis gravidarum, pt has trialed diclegis and zofran w/o relief, will eval w/labs, UA to eval for ketonuria, IV zofran/pepcid/PO maalox for symptoms, likely DCTH w/OB f/u pending w/u. - Bill Solomon, PGY-2

## 2022-12-21 LAB
CULTURE RESULTS: SIGNIFICANT CHANGE UP
SPECIMEN SOURCE: SIGNIFICANT CHANGE UP

## 2023-01-05 ENCOUNTER — APPOINTMENT (OUTPATIENT)
Dept: OBGYN | Facility: CLINIC | Age: 28
End: 2023-01-05
Payer: COMMERCIAL

## 2023-01-05 VITALS
SYSTOLIC BLOOD PRESSURE: 114 MMHG | WEIGHT: 147 LBS | HEIGHT: 63 IN | DIASTOLIC BLOOD PRESSURE: 80 MMHG | BODY MASS INDEX: 26.05 KG/M2 | HEART RATE: 106 BPM

## 2023-01-05 PROCEDURE — 0502F SUBSEQUENT PRENATAL CARE: CPT

## 2023-01-09 ENCOUNTER — TRANSCRIPTION ENCOUNTER (OUTPATIENT)
Age: 28
End: 2023-01-09

## 2023-01-26 ENCOUNTER — NON-APPOINTMENT (OUTPATIENT)
Age: 28
End: 2023-01-26

## 2023-01-26 ENCOUNTER — APPOINTMENT (OUTPATIENT)
Dept: OBGYN | Facility: CLINIC | Age: 28
End: 2023-01-26
Payer: COMMERCIAL

## 2023-01-26 VITALS
HEIGHT: 63 IN | SYSTOLIC BLOOD PRESSURE: 129 MMHG | DIASTOLIC BLOOD PRESSURE: 76 MMHG | BODY MASS INDEX: 28.35 KG/M2 | HEART RATE: 111 BPM | WEIGHT: 160 LBS

## 2023-01-26 PROCEDURE — 0502F SUBSEQUENT PRENATAL CARE: CPT

## 2023-01-29 LAB
AFP MOM: 0.94
AFP VALUE: 41.5 NG/ML
ALPHA FETOPROTEIN SERUM COMMENT: NORMAL
ALPHA FETOPROTEIN SERUM INTERPRETATION: NORMAL
ALPHA FETOPROTEIN SERUM RESULTS: NORMAL
ALPHA FETOPROTEIN SERUM TEST RESULTS: NORMAL
GESTATIONAL AGE BASED ON: NORMAL
GESTATIONAL AGE ON COLLECTION DATE: 18.1 WEEKS
INSULIN DEP DIABETES: NO
MATERNAL AGE AT EDD AFP: 25.8 YR
MULTIPLE GESTATION: NO
OSBR RISK 1 IN: NORMAL
RACE: NORMAL
WEIGHT AFP: 160 LBS

## 2023-02-16 ENCOUNTER — ASOB RESULT (OUTPATIENT)
Age: 28
End: 2023-02-16

## 2023-02-16 ENCOUNTER — APPOINTMENT (OUTPATIENT)
Dept: ANTEPARTUM | Facility: CLINIC | Age: 28
End: 2023-02-16
Payer: COMMERCIAL

## 2023-02-16 PROCEDURE — 76811 OB US DETAILED SNGL FETUS: CPT

## 2023-02-23 ENCOUNTER — APPOINTMENT (OUTPATIENT)
Dept: OBGYN | Facility: CLINIC | Age: 28
End: 2023-02-23
Payer: COMMERCIAL

## 2023-02-23 VITALS
WEIGHT: 166 LBS | SYSTOLIC BLOOD PRESSURE: 114 MMHG | DIASTOLIC BLOOD PRESSURE: 75 MMHG | BODY MASS INDEX: 29.41 KG/M2 | HEIGHT: 63 IN

## 2023-02-23 PROCEDURE — 0502F SUBSEQUENT PRENATAL CARE: CPT

## 2023-02-27 ENCOUNTER — NON-APPOINTMENT (OUTPATIENT)
Age: 28
End: 2023-02-27

## 2023-02-27 ENCOUNTER — ASOB RESULT (OUTPATIENT)
Age: 28
End: 2023-02-27

## 2023-02-27 ENCOUNTER — OUTPATIENT (OUTPATIENT)
Dept: INPATIENT UNIT | Facility: HOSPITAL | Age: 28
LOS: 1 days | Discharge: HOME | End: 2023-02-27
Payer: COMMERCIAL

## 2023-02-27 ENCOUNTER — APPOINTMENT (OUTPATIENT)
Dept: ANTEPARTUM | Facility: CLINIC | Age: 28
End: 2023-02-27
Payer: COMMERCIAL

## 2023-02-27 VITALS
RESPIRATION RATE: 17 BRPM | TEMPERATURE: 99 F | DIASTOLIC BLOOD PRESSURE: 57 MMHG | HEART RATE: 106 BPM | SYSTOLIC BLOOD PRESSURE: 112 MMHG

## 2023-02-27 VITALS — HEART RATE: 95 BPM | DIASTOLIC BLOOD PRESSURE: 67 MMHG | SYSTOLIC BLOOD PRESSURE: 117 MMHG

## 2023-02-27 DIAGNOSIS — Z98.890 OTHER SPECIFIED POSTPROCEDURAL STATES: Chronic | ICD-10-CM

## 2023-02-27 DIAGNOSIS — O26.899 OTHER SPECIFIED PREGNANCY RELATED CONDITIONS, UNSPECIFIED TRIMESTER: ICD-10-CM

## 2023-02-27 LAB
APPEARANCE UR: CLEAR — SIGNIFICANT CHANGE UP
BILIRUB UR-MCNC: NEGATIVE — SIGNIFICANT CHANGE UP
COLOR SPEC: YELLOW — SIGNIFICANT CHANGE UP
DIFF PNL FLD: NEGATIVE — SIGNIFICANT CHANGE UP
GLUCOSE UR QL: NEGATIVE — SIGNIFICANT CHANGE UP
KETONES UR-MCNC: NEGATIVE — SIGNIFICANT CHANGE UP
LEUKOCYTE ESTERASE UR-ACNC: NEGATIVE — SIGNIFICANT CHANGE UP
NITRITE UR-MCNC: NEGATIVE — SIGNIFICANT CHANGE UP
PH UR: 6.5 — SIGNIFICANT CHANGE UP (ref 5–8)
PROT UR-MCNC: ABNORMAL
SP GR SPEC: 1.02 — SIGNIFICANT CHANGE UP (ref 1.01–1.05)
UROBILINOGEN FLD QL: SIGNIFICANT CHANGE UP

## 2023-02-27 PROCEDURE — 76815 OB US LIMITED FETUS(S): CPT | Mod: 26

## 2023-02-27 PROCEDURE — 99221 1ST HOSP IP/OBS SF/LOW 40: CPT

## 2023-02-27 PROCEDURE — 76817 TRANSVAGINAL US OBSTETRIC: CPT | Mod: 26

## 2023-02-27 RX ORDER — TRAZODONE HCL 50 MG
0 TABLET ORAL
Qty: 0 | Refills: 0 | DISCHARGE

## 2023-02-27 RX ORDER — ALBUTEROL 90 UG/1
3 AEROSOL, METERED ORAL
Qty: 0 | Refills: 0 | DISCHARGE

## 2023-02-27 NOTE — OB PROVIDER TRIAGE NOTE - NSOBPROVIDERNOTE_OBGYN_ALL_OB_FT
19:00  Received care of patient   Awaiting UA results  19:45  UA WNL- Dr. Chavez made aware   Cleared for discharge

## 2023-02-27 NOTE — OB RN TRIAGE NOTE - MENTAL HEALTH CONDITIONS/SYMPTOMS, PROFILE
anxiety disorder/depression anxiety disorder/depression/personality disorder Pt hospitalized to [facility] three times, last in 04/2022 for borderline personality disorder./anxiety disorder/depression/personality disorder

## 2023-02-27 NOTE — OB PROVIDER TRIAGE NOTE - ADDITIONAL INSTRUCTIONS
Follow up at next scheduled prenatal appointment  Return for decreased fetal movement, loss of fluid or vaginal bleeding  Increase oral hydration   Belly Band Reviewed

## 2023-02-27 NOTE — OB RN TRIAGE NOTE - NSOBDISCHARGEVS_OBGYN_ALL_OB
Confirmed that patient received an additional set of vital signs prior to discharge. Hatchet Flap Text: The defect edges were debeveled with a #15 scalpel blade.  Given the location of the defect, shape of the defect and the proximity to free margins a hatchet flap was deemed most appropriate.  Using a sterile surgical marker, an appropriate hatchet flap was drawn incorporating the defect and placing the expected incisions within the relaxed skin tension lines where possible.    The area thus outlined was incised deep to adipose tissue with a #15 scalpel blade.  The skin margins were undermined to an appropriate distance in all directions utilizing iris scissors.
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
## 2023-02-27 NOTE — OB PROVIDER TRIAGE NOTE - PLAN OF CARE
D/C Home  D/W Dr. Chavez  No evidence of acute process  Pain most likely due to round ligament pain with movement   Normal fetal testing  Increase oral hydration  Follow up at next scheduled prenatal appointment  Return for decreased fetal movement, loss of fluid or vaginal bleeding  Increase oral hydration   Belly Band Reviewed  Discharged: 20:00

## 2023-02-27 NOTE — OB PROVIDER TRIAGE NOTE - NSHPPHYSICALEXAM_GEN_ALL_CORE
Assessment reveals VSS, abdomen soft, NT.   No rebound, no guarding  No ctx on toco  Limited US- M mode, MVP 7.1, posterior placenta, vtx.- Saved in ASOB  SSE- cervix appears closed.  CL 3.8 no funneling or dynamic changes.     UA sent Assessment reveals VSS, abdomen soft, NT.   No rebound, no guarding  No ctx on toco  Limited US- M mode 130, MVP 7.1, posterior placenta, vtx.- Saved in ASOB- images did not transfer.   SSE- cervix appears closed.  CL 3.8 no funneling or dynamic changes.     UA sent

## 2023-02-28 DIAGNOSIS — F32.A DEPRESSION, UNSPECIFIED: ICD-10-CM

## 2023-02-28 DIAGNOSIS — R10.2 PELVIC AND PERINEAL PAIN: ICD-10-CM

## 2023-02-28 DIAGNOSIS — O26.892 OTHER SPECIFIED PREGNANCY RELATED CONDITIONS, SECOND TRIMESTER: ICD-10-CM

## 2023-02-28 DIAGNOSIS — F41.9 ANXIETY DISORDER, UNSPECIFIED: ICD-10-CM

## 2023-02-28 DIAGNOSIS — Z3A.22 22 WEEKS GESTATION OF PREGNANCY: ICD-10-CM

## 2023-02-28 DIAGNOSIS — Z87.39 PERSONAL HISTORY OF OTHER DISEASES OF THE MUSCULOSKELETAL SYSTEM AND CONNECTIVE TISSUE: ICD-10-CM

## 2023-02-28 DIAGNOSIS — O99.343 OTHER MENTAL DISORDERS COMPLICATING PREGNANCY, THIRD TRIMESTER: ICD-10-CM

## 2023-03-09 DIAGNOSIS — O99.342 OTHER MENTAL DISORDERS COMPLICATING PREGNANCY, SECOND TRIMESTER: ICD-10-CM

## 2023-03-10 NOTE — ED ADULT TRIAGE NOTE - NS_BH TRG QUESTION7_ED_ALL_ED
-- DO NOT REPLY / DO NOT REPLY ALL --  -- Message is from [employer] [facility] [employer] ([facility]) --    General Patient Message: PT is calling wanting to get information regarding blood pressure readings.       Caller Information       Type Contact Phone/Fax    03/10/2023 11:17 AM CST Phone (Incoming) Lucinda Boothe (Self) [phone] (M)        Alternative phone number: No    Can a detailed message be left? Yes    Message Turnaround: [place]:    Refer to site's KB page for routing instructions    Please give this turnaround time to the caller:   \"You can expect to receive a response 2-3 business days after your provider's clinical team reviews the message\"               Anxiety (includes Panic, OCD)

## 2023-03-20 ENCOUNTER — APPOINTMENT (OUTPATIENT)
Dept: OBGYN | Facility: CLINIC | Age: 28
End: 2023-03-20
Payer: COMMERCIAL

## 2023-03-20 VITALS
HEIGHT: 63 IN | HEART RATE: 112 BPM | BODY MASS INDEX: 30.48 KG/M2 | DIASTOLIC BLOOD PRESSURE: 75 MMHG | SYSTOLIC BLOOD PRESSURE: 127 MMHG | WEIGHT: 172 LBS

## 2023-03-20 DIAGNOSIS — Z34.92 ENCOUNTER FOR SUPERVISION OF NORMAL PREGNANCY, UNSPECIFIED, SECOND TRIMESTER: ICD-10-CM

## 2023-03-20 PROCEDURE — 0502F SUBSEQUENT PRENATAL CARE: CPT

## 2023-03-22 LAB
BASOPHILS # BLD AUTO: 0.04 K/UL
BASOPHILS NFR BLD AUTO: 0.4 %
EOSINOPHIL # BLD AUTO: 0.09 K/UL
EOSINOPHIL NFR BLD AUTO: 1 %
GLUCOSE 1H P 100 G GLC PO SERPL-MCNC: 128 MG/DL
HCT VFR BLD CALC: 37.6 %
HGB BLD-MCNC: 11.9 G/DL
IMM GRANULOCYTES NFR BLD AUTO: 0.4 %
LYMPHOCYTES # BLD AUTO: 1.1 K/UL
LYMPHOCYTES NFR BLD AUTO: 12.2 %
MAN DIFF?: NORMAL
MCHC RBC-ENTMCNC: 31 PG
MCHC RBC-ENTMCNC: 31.6 GM/DL
MCV RBC AUTO: 97.9 FL
MONOCYTES # BLD AUTO: 0.32 K/UL
MONOCYTES NFR BLD AUTO: 3.5 %
NEUTROPHILS # BLD AUTO: 7.45 K/UL
NEUTROPHILS NFR BLD AUTO: 82.5 %
PLATELET # BLD AUTO: 224 K/UL
RBC # BLD: 3.84 M/UL
RBC # FLD: 14.6 %
T PALLIDUM AB SER QL IA: NEGATIVE
WBC # FLD AUTO: 9.04 K/UL

## 2023-04-10 ENCOUNTER — NON-APPOINTMENT (OUTPATIENT)
Age: 28
End: 2023-04-10

## 2023-04-10 ENCOUNTER — APPOINTMENT (OUTPATIENT)
Dept: OBGYN | Facility: CLINIC | Age: 28
End: 2023-04-10
Payer: COMMERCIAL

## 2023-04-10 VITALS
HEIGHT: 63 IN | SYSTOLIC BLOOD PRESSURE: 106 MMHG | WEIGHT: 182 LBS | DIASTOLIC BLOOD PRESSURE: 65 MMHG | HEART RATE: 116 BPM | BODY MASS INDEX: 32.25 KG/M2

## 2023-04-10 DIAGNOSIS — L29.9 DISEASES OF THE SKIN AND SUBCUTANEOUS TISSUE COMPLICATING PREGNANCY, THIRD TRIMESTER: ICD-10-CM

## 2023-04-10 DIAGNOSIS — O99.713 DISEASES OF THE SKIN AND SUBCUTANEOUS TISSUE COMPLICATING PREGNANCY, THIRD TRIMESTER: ICD-10-CM

## 2023-04-10 PROCEDURE — 0502F SUBSEQUENT PRENATAL CARE: CPT

## 2023-04-10 RX ORDER — CETIRIZINE HYDROCHLORIDE 10 MG/1
10 TABLET, FILM COATED ORAL DAILY
Qty: 30 | Refills: 1 | Status: ACTIVE | COMMUNITY
Start: 2023-04-10 | End: 1900-01-01

## 2023-04-11 LAB
ALBUMIN SERPL ELPH-MCNC: 3.9 G/DL
ALP BLD-CCNC: 67 U/L
ALT SERPL-CCNC: 15 U/L
AST SERPL-CCNC: 19 U/L
BILIRUB DIRECT SERPL-MCNC: 0.1 MG/DL
BILIRUB INDIRECT SERPL-MCNC: 0.2 MG/DL
BILIRUB SERPL-MCNC: 0.3 MG/DL
PROT SERPL-MCNC: 6 G/DL

## 2023-04-14 LAB — BILE AC SER-MCNC: 2.7 UMOL/L

## 2023-04-26 ENCOUNTER — APPOINTMENT (OUTPATIENT)
Dept: OBGYN | Facility: CLINIC | Age: 28
End: 2023-04-26
Payer: COMMERCIAL

## 2023-04-26 PROCEDURE — 0502F SUBSEQUENT PRENATAL CARE: CPT

## 2023-05-04 ENCOUNTER — ASOB RESULT (OUTPATIENT)
Age: 28
End: 2023-05-04

## 2023-05-04 ENCOUNTER — APPOINTMENT (OUTPATIENT)
Dept: ANTEPARTUM | Facility: CLINIC | Age: 28
End: 2023-05-04
Payer: COMMERCIAL

## 2023-05-04 PROCEDURE — 76816 OB US FOLLOW-UP PER FETUS: CPT

## 2023-05-04 PROCEDURE — 76819 FETAL BIOPHYS PROFIL W/O NST: CPT | Mod: 59

## 2023-05-11 ENCOUNTER — NON-APPOINTMENT (OUTPATIENT)
Age: 28
End: 2023-05-11

## 2023-05-11 ENCOUNTER — APPOINTMENT (OUTPATIENT)
Dept: OBGYN | Facility: CLINIC | Age: 28
End: 2023-05-11
Payer: COMMERCIAL

## 2023-05-11 VITALS
WEIGHT: 188.3 LBS | DIASTOLIC BLOOD PRESSURE: 73 MMHG | HEART RATE: 109 BPM | HEIGHT: 63 IN | BODY MASS INDEX: 33.36 KG/M2 | SYSTOLIC BLOOD PRESSURE: 126 MMHG

## 2023-05-11 DIAGNOSIS — O36.63X0 MATERNAL CARE FOR EXCESSIVE FETAL GROWTH, THIRD TRIMESTER, NOT APPLICABLE OR UNSPECIFIED: ICD-10-CM

## 2023-05-11 DIAGNOSIS — Z23 ENCOUNTER FOR IMMUNIZATION: ICD-10-CM

## 2023-05-11 PROCEDURE — 0502F SUBSEQUENT PRENATAL CARE: CPT

## 2023-05-12 ENCOUNTER — NON-APPOINTMENT (OUTPATIENT)
Age: 28
End: 2023-05-12

## 2023-05-12 ENCOUNTER — APPOINTMENT (OUTPATIENT)
Dept: CARDIOLOGY | Facility: CLINIC | Age: 28
End: 2023-05-12
Payer: COMMERCIAL

## 2023-05-12 VITALS
WEIGHT: 191 LBS | HEART RATE: 102 BPM | DIASTOLIC BLOOD PRESSURE: 80 MMHG | BODY MASS INDEX: 33.84 KG/M2 | SYSTOLIC BLOOD PRESSURE: 110 MMHG | HEIGHT: 63 IN | OXYGEN SATURATION: 97 % | TEMPERATURE: 98.6 F

## 2023-05-12 DIAGNOSIS — O21.0 MILD HYPEREMESIS GRAVIDARUM: ICD-10-CM

## 2023-05-12 DIAGNOSIS — Z83.438 FAMILY HISTORY OF OTHER DISORDER OF LIPOPROTEIN METABOLISM AND OTHER LIPIDEMIA: ICD-10-CM

## 2023-05-12 DIAGNOSIS — F32.A ANXIETY DISORDER, UNSPECIFIED: ICD-10-CM

## 2023-05-12 DIAGNOSIS — Z34.90 ENCOUNTER FOR SUPERVISION OF NORMAL PREGNANCY, UNSPECIFIED, UNSPECIFIED TRIMESTER: ICD-10-CM

## 2023-05-12 DIAGNOSIS — F41.9 ANXIETY DISORDER, UNSPECIFIED: ICD-10-CM

## 2023-05-12 DIAGNOSIS — Z87.891 PERSONAL HISTORY OF NICOTINE DEPENDENCE: ICD-10-CM

## 2023-05-12 DIAGNOSIS — R89.9 UNSPECIFIED ABNORMAL FINDING IN SPECIMENS FROM OTHER ORGANS, SYSTEMS AND TISSUES: ICD-10-CM

## 2023-05-12 DIAGNOSIS — R06.02 SHORTNESS OF BREATH: ICD-10-CM

## 2023-05-12 PROCEDURE — 93306 TTE W/DOPPLER COMPLETE: CPT

## 2023-05-12 PROCEDURE — 93970 EXTREMITY STUDY: CPT

## 2023-05-12 PROCEDURE — 93000 ELECTROCARDIOGRAM COMPLETE: CPT

## 2023-05-12 PROCEDURE — 99204 OFFICE O/P NEW MOD 45 MIN: CPT | Mod: 25

## 2023-05-12 NOTE — PHYSICAL EXAM
[Well Developed] : well developed [Well Nourished] : well nourished [No Acute Distress] : no acute distress [Normal Conjunctiva] : normal conjunctiva [Normal Venous Pressure] : normal venous pressure [No Carotid Bruit] : no carotid bruit [Normal S1, S2] : normal S1, S2 [No Murmur] : no murmur [No Rub] : no rub [No Gallop] : no gallop [Clear Lung Fields] : clear lung fields [Good Air Entry] : good air entry [No Respiratory Distress] : no respiratory distress  [Soft] : abdomen soft [Non Tender] : non-tender [No Masses/organomegaly] : no masses/organomegaly [Normal Bowel Sounds] : normal bowel sounds [Normal Gait] : normal gait [No Cyanosis] : no cyanosis [No Clubbing] : no clubbing [No Varicosities] : no varicosities [No Rash] : no rash [Moves all extremities] : moves all extremities [No Skin Lesions] : no skin lesions [No Focal Deficits] : no focal deficits [Normal Speech] : normal speech [Alert and Oriented] : alert and oriented [Normal memory] : normal memory [de-identified] : protuberant c/w pregnancy  [de-identified] : trace edema

## 2023-05-12 NOTE — HISTORY OF PRESENT ILLNESS
[FreeTextEntry1] : This is a 28 y/o female, 33 weeks pregnant here today to establish cardiac care. Patient reports a month history sensation of "heart racing," worse when lying flat on back, improved when lying on left side. Symptoms also occur intermittently throughout the day. she reports rare dizziness not associated with palpitations. Patient also reports SOB at rest.pt with symptoms when lying down not with walking able to walk dog no issues  Patient denies chest pain, syncope.

## 2023-05-16 PROBLEM — R89.9 ABNORMAL LABORATORY TEST RESULT: Status: ACTIVE | Noted: 2023-05-16

## 2023-05-16 LAB
ALBUMIN SERPL ELPH-MCNC: 3.9 G/DL
ALBUMIN SERPL ELPH-MCNC: 4 G/DL
ALP BLD-CCNC: 94 U/L
ALP BLD-CCNC: 94 U/L
ALT SERPL-CCNC: 10 U/L
ALT SERPL-CCNC: 12 U/L
ANION GAP SERPL CALC-SCNC: 14 MMOL/L
APPEARANCE: ABNORMAL
AST SERPL-CCNC: 18 U/L
AST SERPL-CCNC: 20 U/L
BACTERIA UR CULT: NORMAL
BACTERIA: ABNORMAL /HPF
BASOPHILS # BLD AUTO: 0.05 K/UL
BASOPHILS NFR BLD AUTO: 0.5 %
BILIRUB DIRECT SERPL-MCNC: 0.1 MG/DL
BILIRUB INDIRECT SERPL-MCNC: 0.1 MG/DL
BILIRUB SERPL-MCNC: 0.2 MG/DL
BILIRUB SERPL-MCNC: 0.2 MG/DL
BILIRUBIN URINE: NEGATIVE
BLOOD URINE: NEGATIVE
BUN SERPL-MCNC: 13 MG/DL
CALCIUM SERPL-MCNC: 10.1 MG/DL
CAST: 6 /LPF
CHLORIDE SERPL-SCNC: 104 MMOL/L
CHOLEST SERPL-MCNC: 271 MG/DL
CK SERPL-CCNC: 24 U/L
CO2 SERPL-SCNC: 20 MMOL/L
COLOR: YELLOW
CREAT SERPL-MCNC: 0.66 MG/DL
EGFR: 125 ML/MIN/1.73M2
EOSINOPHIL # BLD AUTO: 0.23 K/UL
EOSINOPHIL NFR BLD AUTO: 2.4 %
EPITHELIAL CELLS: 15 /HPF
ESTIMATED AVERAGE GLUCOSE: 94 MG/DL
FERRITIN SERPL-MCNC: 30 NG/ML
FOLATE SERPL-MCNC: 17.3 NG/ML
GLUCOSE QUALITATIVE U: NEGATIVE MG/DL
GLUCOSE SERPL-MCNC: 84 MG/DL
HAPTOGLOB SERPL-MCNC: 68 MG/DL
HBA1C MFR BLD HPLC: 4.9 %
HCT VFR BLD CALC: 38.2 %
HDLC SERPL-MCNC: 71 MG/DL
HGB BLD-MCNC: 12.7 G/DL
IMM GRANULOCYTES NFR BLD AUTO: 0.3 %
IRON SERPL-MCNC: 139 UG/DL
KETONES URINE: NEGATIVE MG/DL
LDH SERPL-CCNC: 170 U/L
LDLC SERPL CALC-MCNC: 136 MG/DL
LDLC SERPL DIRECT ASSAY-MCNC: 164 MG/DL
LEUKOCYTE ESTERASE URINE: ABNORMAL
LYMPHOCYTES # BLD AUTO: 1.51 K/UL
LYMPHOCYTES NFR BLD AUTO: 15.8 %
MAN DIFF?: NORMAL
MCHC RBC-ENTMCNC: 31.1 PG
MCHC RBC-ENTMCNC: 33.2 GM/DL
MCV RBC AUTO: 93.6 FL
MICROSCOPIC-UA: NORMAL
MONOCYTES # BLD AUTO: 0.63 K/UL
MONOCYTES NFR BLD AUTO: 6.6 %
NEUTROPHILS # BLD AUTO: 7.1 K/UL
NEUTROPHILS NFR BLD AUTO: 74.4 %
NITRITE URINE: NEGATIVE
NONHDLC SERPL-MCNC: 200 MG/DL
PH URINE: 6
PLATELET # BLD AUTO: 198 K/UL
POTASSIUM SERPL-SCNC: 4.1 MMOL/L
PROT SERPL-MCNC: 6.2 G/DL
PROT SERPL-MCNC: 6.7 G/DL
PROTEIN URINE: NORMAL MG/DL
RBC # BLD: 4.08 M/UL
RBC # FLD: 14 %
RED BLOOD CELLS URINE: 1 /HPF
REVIEW: NORMAL
SODIUM SERPL-SCNC: 138 MMOL/L
SPECIFIC GRAVITY URINE: 1.02
T4 FREE SERPL-MCNC: 1.1 NG/DL
TRANSFERRIN SERPL-MCNC: 384 MG/DL
TRIGL SERPL-MCNC: 320 MG/DL
TSH SERPL-ACNC: 2.12 UIU/ML
UROBILINOGEN URINE: 0.2 MG/DL
VIT B12 SERPL-MCNC: 419 PG/ML
WBC # FLD AUTO: 9.55 K/UL
WHITE BLOOD CELLS URINE: 32 /HPF

## 2023-05-20 PROCEDURE — 93224 XTRNL ECG REC UP TO 48 HRS: CPT

## 2023-05-22 ENCOUNTER — NON-APPOINTMENT (OUTPATIENT)
Age: 28
End: 2023-05-22

## 2023-05-22 ENCOUNTER — APPOINTMENT (OUTPATIENT)
Dept: OBGYN | Facility: CLINIC | Age: 28
End: 2023-05-22

## 2023-05-23 ENCOUNTER — NON-APPOINTMENT (OUTPATIENT)
Age: 28
End: 2023-05-23

## 2023-06-01 ENCOUNTER — APPOINTMENT (OUTPATIENT)
Dept: OBGYN | Facility: CLINIC | Age: 28
End: 2023-06-01
Payer: COMMERCIAL

## 2023-06-01 VITALS
BODY MASS INDEX: 35.17 KG/M2 | HEART RATE: 80 BPM | DIASTOLIC BLOOD PRESSURE: 83 MMHG | SYSTOLIC BLOOD PRESSURE: 127 MMHG | HEIGHT: 63 IN | WEIGHT: 198.5 LBS

## 2023-06-01 DIAGNOSIS — N76.6 ULCERATION OF VULVA: ICD-10-CM

## 2023-06-01 PROCEDURE — 0502F SUBSEQUENT PRENATAL CARE: CPT

## 2023-06-02 ENCOUNTER — NON-APPOINTMENT (OUTPATIENT)
Age: 28
End: 2023-06-02

## 2023-06-06 ENCOUNTER — NON-APPOINTMENT (OUTPATIENT)
Age: 28
End: 2023-06-06

## 2023-06-06 LAB
GP B STREP DNA SPEC QL NAA+PROBE: NOT DETECTED
SOURCE GBS: NORMAL

## 2023-06-08 ENCOUNTER — NON-APPOINTMENT (OUTPATIENT)
Age: 28
End: 2023-06-08

## 2023-06-08 ENCOUNTER — OUTPATIENT (OUTPATIENT)
Dept: INPATIENT UNIT | Facility: HOSPITAL | Age: 28
LOS: 1 days | Discharge: HOME | End: 2023-06-08
Payer: COMMERCIAL

## 2023-06-08 ENCOUNTER — APPOINTMENT (OUTPATIENT)
Dept: OBGYN | Facility: CLINIC | Age: 28
End: 2023-06-08
Payer: COMMERCIAL

## 2023-06-08 VITALS
DIASTOLIC BLOOD PRESSURE: 85 MMHG | HEIGHT: 63 IN | WEIGHT: 198 LBS | SYSTOLIC BLOOD PRESSURE: 127 MMHG | BODY MASS INDEX: 35.08 KG/M2

## 2023-06-08 VITALS — HEIGHT: 63 IN

## 2023-06-08 VITALS — TEMPERATURE: 99 F

## 2023-06-08 DIAGNOSIS — Z98.890 OTHER SPECIFIED POSTPROCEDURAL STATES: Chronic | ICD-10-CM

## 2023-06-08 DIAGNOSIS — Z98.1 ARTHRODESIS STATUS: Chronic | ICD-10-CM

## 2023-06-08 LAB
APPEARANCE UR: CLEAR — SIGNIFICANT CHANGE UP
BACTERIA # UR AUTO: NEGATIVE — SIGNIFICANT CHANGE UP
BASOPHILS # BLD AUTO: 0.05 K/UL — SIGNIFICANT CHANGE UP (ref 0–0.2)
BASOPHILS NFR BLD AUTO: 0.6 % — SIGNIFICANT CHANGE UP (ref 0–2)
BILIRUB UR-MCNC: NEGATIVE — SIGNIFICANT CHANGE UP
COLOR SPEC: YELLOW — SIGNIFICANT CHANGE UP
DIFF PNL FLD: NEGATIVE — SIGNIFICANT CHANGE UP
EOSINOPHIL # BLD AUTO: 0.08 K/UL — SIGNIFICANT CHANGE UP (ref 0–0.5)
EOSINOPHIL NFR BLD AUTO: 0.9 % — SIGNIFICANT CHANGE UP (ref 0–6)
EPI CELLS # UR: 4 /HPF — SIGNIFICANT CHANGE UP (ref 0–5)
GLUCOSE UR QL: NEGATIVE — SIGNIFICANT CHANGE UP
HCT VFR BLD CALC: 35 % — SIGNIFICANT CHANGE UP (ref 34.5–45)
HGB BLD-MCNC: 12 G/DL — SIGNIFICANT CHANGE UP (ref 11.5–15.5)
HYALINE CASTS # UR AUTO: 2 /LPF — SIGNIFICANT CHANGE UP (ref 0–7)
IANC: 6.15 K/UL — SIGNIFICANT CHANGE UP (ref 1.8–7.4)
IMM GRANULOCYTES NFR BLD AUTO: 0.6 % — SIGNIFICANT CHANGE UP (ref 0–0.9)
KETONES UR-MCNC: NEGATIVE — SIGNIFICANT CHANGE UP
LEUKOCYTE ESTERASE UR-ACNC: NEGATIVE — SIGNIFICANT CHANGE UP
LYMPHOCYTES # BLD AUTO: 1.73 K/UL — SIGNIFICANT CHANGE UP (ref 1–3.3)
LYMPHOCYTES # BLD AUTO: 20 % — SIGNIFICANT CHANGE UP (ref 13–44)
MCHC RBC-ENTMCNC: 29.9 PG — SIGNIFICANT CHANGE UP (ref 27–34)
MCHC RBC-ENTMCNC: 34.3 GM/DL — SIGNIFICANT CHANGE UP (ref 32–36)
MCV RBC AUTO: 87.3 FL — SIGNIFICANT CHANGE UP (ref 80–100)
MONOCYTES # BLD AUTO: 0.61 K/UL — SIGNIFICANT CHANGE UP (ref 0–0.9)
MONOCYTES NFR BLD AUTO: 7 % — SIGNIFICANT CHANGE UP (ref 2–14)
NEUTROPHILS # BLD AUTO: 6.15 K/UL — SIGNIFICANT CHANGE UP (ref 1.8–7.4)
NEUTROPHILS NFR BLD AUTO: 70.9 % — SIGNIFICANT CHANGE UP (ref 43–77)
NITRITE UR-MCNC: NEGATIVE — SIGNIFICANT CHANGE UP
NRBC # BLD: 0 /100 WBCS — SIGNIFICANT CHANGE UP (ref 0–0)
NRBC # FLD: 0 K/UL — SIGNIFICANT CHANGE UP (ref 0–0)
PH UR: 6.5 — SIGNIFICANT CHANGE UP (ref 5–8)
PLATELET # BLD AUTO: 159 K/UL — SIGNIFICANT CHANGE UP (ref 150–400)
PROT UR-MCNC: ABNORMAL
RBC # BLD: 4.01 M/UL — SIGNIFICANT CHANGE UP (ref 3.8–5.2)
RBC # FLD: 13.4 % — SIGNIFICANT CHANGE UP (ref 10.3–14.5)
RBC CASTS # UR COMP ASSIST: 1 /HPF — SIGNIFICANT CHANGE UP (ref 0–4)
SP GR SPEC: 1.02 — SIGNIFICANT CHANGE UP (ref 1.01–1.05)
UROBILINOGEN FLD QL: SIGNIFICANT CHANGE UP
WBC # BLD: 8.67 K/UL — SIGNIFICANT CHANGE UP (ref 3.8–10.5)
WBC # FLD AUTO: 8.67 K/UL — SIGNIFICANT CHANGE UP (ref 3.8–10.5)
WBC UR QL: 4 /HPF — SIGNIFICANT CHANGE UP (ref 0–5)

## 2023-06-08 PROCEDURE — 0502F SUBSEQUENT PRENATAL CARE: CPT

## 2023-06-08 PROCEDURE — 99221 1ST HOSP IP/OBS SF/LOW 40: CPT

## 2023-06-08 NOTE — OB RN TRIAGE NOTE - PAIN RATING/NUMBER SCALE (0-10): ACTIVITY
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 Hernadez, Chronic Systolic HF, Chronic Hypertension & Atrial Tachycardia who was admitted 3/25/18 after he presented to [place] [facility] from [facility] with c/o shortness of breath and fatigue.     Patient recently discharged after prolonged hospitalization (18-->3/21/18) for failure to thrive, generalized deconditioning, decompensated heart failure and recurrent pleural effusion(s). PEG placed on  & patient initiated on nocturnal enteral nutrition with general PO intake during the day. Patient and wife endorse PO intake has been poor; he does endorse thirst.     Labs on admission indicative of acute kidney injury (creatinine 0.56-->2.48) with associated severe hyperkalemia (6.4). Nephrology consulted and orders implemented for potassium suppression.    Summary of Stay:  3/26--18: See notes prior to 18: feels more short of breath, weight increasing, increased pedal edema, less activity tolerance  : weight continues to increase, starting to feel more bloated. Anxious. Wife present.  : Worsening SOB.  Decreased sleep overnight.  Wt climbing  : SOB continues to worsen with weight gain, now up 8-9 kg above ideal dry weight. Increasing swelling and GI distention  : excellent response to infusion loop diuretics with 2.6L out. Still c/o groin swelling and SOB  : Good diuresis on lasix drip at 10 mg/hr per nephrology. Improvement in metabolic alkalosis with diamox.   : Continues to diurese well. Refused therapies intermittently during the  weekend. Reports anxiety and SOB.   4/24: ambulating short distances. Continues to feel anxious. Breathing appeared non labored this am but reports SOB. Poor appetite.   4/25: Sitting up in chair this am, respiratory status stable. Reports no sleep last night. Diuresing well  4/26: getting ready to transfer to . Feels tired, short of breath after working with RT.   4/27: H/H trending down, no obvious s/s bleeding, INR 2.3, off heparin.  Tired.  4/30: Seen up walking in halls..    Review of Systems: Pertinent items are listed in Summary of Stay  Chronic back pain    Nutrition: Enteral nutrition + general PO diet as tolerated     Medications/Infusions:  Reviewed    Rhythm: V-Paced with underlying atrial flutter    Arrythmias:  None appreciated     UNOS Status:  7      Vital Last Value 24 Hour Range   Temperature 98.5 °F (36.9 °C) Temp  Min: 97.7 °F (36.5 °C)  Max: 98.5 °F (36.9 °C)   Pulse 101 Pulse  Min: 97  Max: 124   Respiratory (!) 37 Resp  Min: 23  Max: 37   Blood Pressure 119/83 No Data Recorded   Pulse Oximetry 96 % SpO2  Min: 94 %  Max: 100 %     Vital Today Admitted   Weight 61.8 kg Weight: 59.9 kg   Height N/A Height: 5' 7\" (170.2 cm)   BMI (body mass index) N/A BMI (Calculated): 20.73       Weight over the past 48 Hours:  Patient Vitals for the past 48 hrs:   Weight   04/29/18 0600 62.7 kg   04/30/18 0500 61.8 kg      Intake/Output:  No intake/output data recorded.  I/O last 3 completed shifts:  In: 2677.1 [P.O.:1260; I.V.:478.1; NG/GT:939]  Out: 2510 [Urine:2510]    Intake/Output Summary (Last 24 hours) at 04/30/18 1255  Last data filed at 04/30/18 0630   Gross per 24 hour   Intake          2677.13 ml   Output             2110 ml   Net           567.13 ml       Physical Assessment:    General:    Up walking in dillon. Cachetic/frail.     Incision:    Driveline with drsg CDI   EENT:    PERRL.     Oral Mucosa:    Dry   Neck:   Trachea midline, JVD 10 cm, +HJR   Lungs:     Diminished throughout.  Poor effort  on exam   Cardiovascular:   Normal VAD sounds. 1+ BLE edema improving   Abdomen:     Firm. Distended, non-tender, BS + x4. PEG with dressing intact   Pulses:   Non-pulsatile     Skin:   Warm core/periphery. Dry and intact.    Neurologic:   Awake, alert.  Motor grossly intact  MS: cervical kyphosis         Laboratory Results:  Lab Results   Component Value Date    WBC 7.9 04/30/2018    HGB 7.9 (L) 04/30/2018    HCT 27.1 (L) 04/30/2018     04/30/2018    BUN 27 (H) 04/30/2018    CREATININE 0.52 (L) 04/30/2018    SODIUM 134 (L) 04/30/2018    POTASSIUM 3.7 04/30/2018    CO2 42 (HH) 04/30/2018    MG 2.5 (H) 04/29/2018    BILIRUBIN 0.2 04/11/2018    INR 1.9 04/30/2018    PTT 34 (H) 04/27/2018    LACTA 0.9 03/25/2018    ALKPT 101 04/11/2018    AST 27 04/11/2018    GPT <6 04/11/2018    ALBUMIN 2.4 (L) 04/11/2018    NAZIA 1.16 03/26/2018    GLUCOSE 138 (H) 04/30/2018    TSH 24.510 (H) 04/14/2018     Echo 4/18/18  LVAD 2500 RPM . Velocities : Inflow = 0.72 m/s . Outflow = 1.39 m/s .  IVS is in mid position .  Bioprosthetic aortic valve. Aortic valve does not open .  Compared to prior study , dated 3/26/18 , LVAD outflow velocity decreased .    ECHO 3/25/18  S/p Heartware LVAD (5/3/17) @ 2500 RPM's.  Inflow cannula velocity: 0.93 m/s.  Outflow cannula velocity: 2.71 m/s  Interventricular septum is midline.  Severely decreased left ventricular systolic function.  S/p redo 19 mm Magna Ease bioprosthetic AVR. Trace aortic regurgitation.  Right ventricle not well visualized.  Mild tricuspid valve regurgitation.  No pericardial effusion.  Dilated IVC, RA 15  Compared to the prior study (2/13/18), the outflow cannula velocity has increased from 1.24 m/s to 2.71 m/s on today's study.  Repeat TTE in 24 hours to re evaluate the outflow canulaNo other significant changes     RHC 3/9/18.  Wt 58.5kg  Results: HeartWare (speed 2500 RPM, Flow 3.9 L/min, Power 3.0 victoria, HCT 24%)  1. RA pressure 17 mmHg  2. Right ventricular 51/16  mmHg  3. PA pressure 51/20/ 30 mmHg   4. Pulmonary capillary wedge pressure 18 mmHg  5. Transpulmonary gradient (mean PA-PCWP) 12 mmHg  6. TD CO was not performed  7. Erika CO 4.83 L/min, Erika CI 2.88 L/min/m2  8. PVR (TPG/C.O) 2.48 wood units   9. Aortic saturation: 98%.   10. PA sat 57.9%.   11. Hemoglobin 7.5   12. HR: 70 BPM       Diagnosis/Plan:   S/P Heartware LVAD as BTT (5/3/17-Dr Hernadez) due to NICMP / Jm Syndrome. Acute on Chronic Systolic HF. ACC/AHA Stage D, NYHA Functional Class IV. Chronic Hypertension.    Volume Status: Elevated Perfusion Status: Warm   -Current VAD speed 2500 RPM   -Continue Lasix drip 10 mg/hour   -Eplerenone 25 mg daily   -Discontinued Norvasc - significant LE swelling   -Losartan 50 mg daily   -Goal MAP 60-85 mmHg   -Allergic to Hydralazine    -Allergic to ASA; continue Plavix 75 mg daily for anti-platelet   -Daily warfarin dosing per FT; goal INR 2-3 (modified 2/2 Hx GIBs).     -Avoid Heparin bridging.   -Driveline dressing changes per protocol   -Daily standing weights and strict I&O   -Outpatient referral placed for CardioMEMS               Hypercapnic respiratory failure / hypoventilation   Recurrent Pleural Effusions S/P Right PleurX 3/12-4/6/18. Left pleural effusion s/p thoracentesis 4/3.  MSSA colonized right Pleural Fluid    -Dr. Tompkins reconsidering trach   -Refusing BiPAP overnoc 2/2 claustrophobia    -CT chest today    MSSA Bacteremia and +MSSA from right PleurX tube (fluid not consistent with empyema)   -ID following   -IV Ancef for bacteremia through 5/7   -LITZY to rule out vegetation not performed due to tenuous respiratory status     Chronic Atrial Fibrillation. NSVT. .    -AC as above       Hypothyroidism and Low Testosterone / Hypogonadism             -Synthroid 125 mcg daily              -Testosterone gel     Failure to Thrive / Deconditioning / Severe Protein Malnutrition S/P PEG 2/6/18             -PEG.  Continue tube feeding w/ free water flushes, encourage  PO diet              -PT/OT/CR as tolerated             -Dietician following     Insomnia   -Melatonin + Zyprexa and prn benzo at night    Depression/Anxiety              -Clonazepam PRN   -Shelia Morales & Lesly following   -Zoloft 50 mg daily     Chronic Back Pain   -Followed by pain management    -Currently on sched [place] TD    Roddy Henry APNP  [phone]    CARDIOLOGY ATTENDING NOTE:  I have examined this patient, reviewed all pertinent lab and radiology data, and determined the findings detailed above. I agree with the assessment and plan as detailed above. The above exam reflects my own personal exam in its entirety.  CV exam: comfortable / alert / up to chair, normal VAD sounds, diminished breath sounds bilateral bases, abdomen is distended and tympanic, minimal peripheral edema.     Please feel free to call me if there are any further questions regarding this patient.    Trever Garzon MD  Advanced Heart Failure & Cardiac Transplant / Pulmonary Hypertension  Pager [phone]     0 (no pain/absence of nonverbal indicators of pain)

## 2023-06-08 NOTE — OB RN TRIAGE NOTE - MENTAL HEALTH CONDITIONS/SYMPTOMS, PROFILE
Pt hospitalized to [facility] three times, last in 04/2022 for borderline personality disorder./anxiety disorder/depression/personality disorder

## 2023-06-09 VITALS — SYSTOLIC BLOOD PRESSURE: 128 MMHG | HEART RATE: 80 BPM | DIASTOLIC BLOOD PRESSURE: 81 MMHG

## 2023-06-09 LAB
ALBUMIN SERPL ELPH-MCNC: 3.3 G/DL — SIGNIFICANT CHANGE UP (ref 3.3–5)
ALP SERPL-CCNC: 125 U/L — HIGH (ref 40–120)
ALT FLD-CCNC: 29 U/L — SIGNIFICANT CHANGE UP (ref 4–33)
ANION GAP SERPL CALC-SCNC: 12 MMOL/L — SIGNIFICANT CHANGE UP (ref 7–14)
APTT BLD: 27.7 SEC — SIGNIFICANT CHANGE UP (ref 27–36.3)
AST SERPL-CCNC: 30 U/L — SIGNIFICANT CHANGE UP (ref 4–32)
BILIRUB SERPL-MCNC: 0.2 MG/DL — SIGNIFICANT CHANGE UP (ref 0.2–1.2)
BUN SERPL-MCNC: 19 MG/DL — SIGNIFICANT CHANGE UP (ref 7–23)
CALCIUM SERPL-MCNC: 9.2 MG/DL — SIGNIFICANT CHANGE UP (ref 8.4–10.5)
CHLORIDE SERPL-SCNC: 105 MMOL/L — SIGNIFICANT CHANGE UP (ref 98–107)
CO2 SERPL-SCNC: 22 MMOL/L — SIGNIFICANT CHANGE UP (ref 22–31)
CREAT ?TM UR-MCNC: 134 MG/DL — SIGNIFICANT CHANGE UP
CREAT SERPL-MCNC: 0.93 MG/DL — SIGNIFICANT CHANGE UP (ref 0.5–1.3)
EGFR: 87 ML/MIN/1.73M2 — SIGNIFICANT CHANGE UP
FIBRINOGEN PPP-MCNC: 367 MG/DL — SIGNIFICANT CHANGE UP (ref 200–465)
GLUCOSE SERPL-MCNC: 92 MG/DL — SIGNIFICANT CHANGE UP (ref 70–99)
HIV1+2 AB SPEC QL IA.RAPID: NONREACTIVE
INR BLD: <0.9 RATIO — SIGNIFICANT CHANGE UP (ref 0.88–1.16)
LDH SERPL L TO P-CCNC: 197 U/L — SIGNIFICANT CHANGE UP (ref 135–225)
POTASSIUM SERPL-MCNC: 4.8 MMOL/L — SIGNIFICANT CHANGE UP (ref 3.5–5.3)
POTASSIUM SERPL-SCNC: 4.8 MMOL/L — SIGNIFICANT CHANGE UP (ref 3.5–5.3)
PROT ?TM UR-MCNC: 189 MG/DL — SIGNIFICANT CHANGE UP
PROT SERPL-MCNC: 5.8 G/DL — LOW (ref 6–8.3)
PROT/CREAT UR-RTO: 1.4 RATIO — HIGH (ref 0–0.2)
PROTHROM AB SERPL-ACNC: 10.3 SEC — LOW (ref 10.5–13.4)
SODIUM SERPL-SCNC: 139 MMOL/L — SIGNIFICANT CHANGE UP (ref 135–145)
URATE SERPL-MCNC: 9.1 MG/DL — HIGH (ref 2.5–7)

## 2023-06-09 NOTE — OB PROVIDER TRIAGE NOTE - ADDITIONAL INSTRUCTIONS
Follow up at next scheduled prenatal appointment  Return for decreased fetal movement, loss of fluid or vaginal bleeding  Increase oral hydration  Signs and Symptoms of Labor reviewed   Kick Counts Reviewed   24hour urine to be completed and return Monday 6/12

## 2023-06-09 NOTE — OB PROVIDER TRIAGE NOTE - HISTORY OF PRESENT ILLNESS
28y/o  @37.2wks presents from Dr. Bejarano's office to r/o PEC, patient was seen in the office today for her normal prenatal appointment and had 300 protein in her dipstick urine and was sent for further evaluation. Patient also c/o [id number]  Reports good fetal movement  Denies LOF/VB    Allergies: Denies  Medications: PNV, Gabapentin     Medical HX: Spondylolisthesis of lumbosacral region   Surgical HX: Spine Fusion  Psy HX: Anxiety and Depression  Denies Etoh/Smoke/Drugs     28y/o  @37.2wks presents from Dr. Bejarano's office to r/o PEC, patient was seen in the office today for her normal prenatal appointment and had 300 protein in her dipstick urine and was sent for further evaluation. Patient also c/o [id number] but states she had a TMJ flair up that causes a HA and currently feels "pain free"   Reports good fetal movement  Denies LOF/VB    Allergies: Denies  Medications: PNV, Gabapentin     Medical HX: Spondylolisthesis of lumbosacral region   Surgical HX: Spine Fusion  Psy HX: Anxiety and Depression, [facility] Admission   Denies Etoh/Smoke/Drugs

## 2023-06-09 NOTE — OB PROVIDER TRIAGE NOTE - NSHPLABSRESULTS_GEN_ALL_CORE
CBC Full  -  ( 08 Jun 2023 23:30 )  WBC Count : 8.67 K/uL  RBC Count : 4.01 M/uL  Hemoglobin : 12.0 g/dL  Hematocrit : 35.0 %  Platelet Count - Automated : 159 K/uL  Mean Cell Volume : 87.3 fL  Mean Cell Hemoglobin : 29.9 pg  Mean Cell Hemoglobin Concentration : 34.3 gm/dL  Auto Neutrophil # : 6.15 K/uL  Auto Lymphocyte # : 1.73 K/uL  Auto Monocyte # : 0.61 K/uL  Auto Eosinophil # : 0.08 K/uL  Auto Basophil # : 0.05 K/uL  Auto Neutrophil % : 70.9 %  Auto Lymphocyte % : 20.0 %  Auto Monocyte % : 7.0 %  Auto Eosinophil % : 0.9 %  Auto Basophil % : 0.6 %    06-08-23 @ 23:30    139  |  105  |  19             --------------------------< 92     4.8  |  22  | 0.93    eGFR AA: --  eGFR N-AA: --    Calcium: 9.2  Phosphorus: --  Magnesium: --    AST: 30    ALT: 29  AlkPhos: 125<H>  Protein: 5.8<L>  Albumin: 3.3  TBili: 0.2  D-Bili: --

## 2023-06-09 NOTE — OB PROVIDER TRIAGE NOTE - NS_OBGYNHISTORY_OBGYN_ALL_OB_FT
GYN: HSV last outbreak 12/2021  OB: Denies      AP course complicated by  -GDMA1 GYN: HSV last outbreak 12/2021  OB: Denies      AP course complicated by  -GDMA1- has first appointment on Monday with diabetic counselor discussed plan with patient and patient's son to call 911 or go to nearest ED in case of SI or other acute decompensation

## 2023-06-09 NOTE — OB PROVIDER TRIAGE NOTE - PLAN OF CARE
D/C Home  D/W Dr. Castellon  No evidence of hypertensive disorder at this time  Normal fetal testing  BPP 8/8  Follow up at next scheduled prenatal appointment  Return for decreased fetal movement, loss of fluid or vaginal bleeding  Increase oral hydration  Signs and Symptoms of Labor reviewed   Kick Counts Reviewed   24hour urine to be completed and return Monday 6/12

## 2023-06-12 ENCOUNTER — ASOB RESULT (OUTPATIENT)
Age: 28
End: 2023-06-12

## 2023-06-12 ENCOUNTER — APPOINTMENT (OUTPATIENT)
Dept: MATERNAL FETAL MEDICINE | Facility: CLINIC | Age: 28
End: 2023-06-12
Payer: COMMERCIAL

## 2023-06-12 DIAGNOSIS — O24.419 GESTATIONAL DIABETES MELLITUS IN PREGNANCY, UNSPECIFIED CONTROL: ICD-10-CM

## 2023-06-12 PROCEDURE — G0109 DIAB MANAGE TRN IND/GROUP: CPT | Mod: 95

## 2023-06-13 ENCOUNTER — APPOINTMENT (OUTPATIENT)
Dept: OBGYN | Facility: CLINIC | Age: 28
End: 2023-06-13

## 2023-06-13 DIAGNOSIS — Z98.1 ARTHRODESIS STATUS: ICD-10-CM

## 2023-06-13 DIAGNOSIS — R51.9 HEADACHE, UNSPECIFIED: ICD-10-CM

## 2023-06-13 DIAGNOSIS — F41.8 OTHER SPECIFIED ANXIETY DISORDERS: ICD-10-CM

## 2023-06-13 DIAGNOSIS — O24.410 GESTATIONAL DIABETES MELLITUS IN PREGNANCY, DIET CONTROLLED: ICD-10-CM

## 2023-06-13 DIAGNOSIS — O99.343 OTHER MENTAL DISORDERS COMPLICATING PREGNANCY, THIRD TRIMESTER: ICD-10-CM

## 2023-06-13 DIAGNOSIS — Z3A.37 37 WEEKS GESTATION OF PREGNANCY: ICD-10-CM

## 2023-06-13 DIAGNOSIS — O26.899 OTHER SPECIFIED PREGNANCY RELATED CONDITIONS, UNSPECIFIED TRIMESTER: ICD-10-CM

## 2023-06-13 DIAGNOSIS — O26.893 OTHER SPECIFIED PREGNANCY RELATED CONDITIONS, THIRD TRIMESTER: ICD-10-CM

## 2023-06-14 ENCOUNTER — NON-APPOINTMENT (OUTPATIENT)
Age: 28
End: 2023-06-14

## 2023-06-14 ENCOUNTER — APPOINTMENT (OUTPATIENT)
Dept: OBGYN | Facility: CLINIC | Age: 28
End: 2023-06-14
Payer: COMMERCIAL

## 2023-06-14 ENCOUNTER — APPOINTMENT (OUTPATIENT)
Dept: ANTEPARTUM | Facility: CLINIC | Age: 28
End: 2023-06-14

## 2023-06-14 ENCOUNTER — ASOB RESULT (OUTPATIENT)
Age: 28
End: 2023-06-14

## 2023-06-14 ENCOUNTER — INPATIENT (INPATIENT)
Facility: HOSPITAL | Age: 28
LOS: 6 days | Discharge: HOME | End: 2023-06-21
Attending: OBSTETRICS & GYNECOLOGY | Admitting: OBSTETRICS & GYNECOLOGY
Payer: COMMERCIAL

## 2023-06-14 ENCOUNTER — APPOINTMENT (OUTPATIENT)
Dept: ANTEPARTUM | Facility: CLINIC | Age: 28
End: 2023-06-14
Payer: COMMERCIAL

## 2023-06-14 VITALS
BODY MASS INDEX: 36.18 KG/M2 | SYSTOLIC BLOOD PRESSURE: 149 MMHG | HEART RATE: 87 BPM | WEIGHT: 204.2 LBS | DIASTOLIC BLOOD PRESSURE: 89 MMHG | HEIGHT: 63 IN

## 2023-06-14 VITALS — DIASTOLIC BLOOD PRESSURE: 93 MMHG | SYSTOLIC BLOOD PRESSURE: 142 MMHG

## 2023-06-14 VITALS — RESPIRATION RATE: 16 BRPM | TEMPERATURE: 98 F

## 2023-06-14 DIAGNOSIS — O12.13 GESTATIONAL PROTEINURIA, THIRD TRIMESTER: ICD-10-CM

## 2023-06-14 DIAGNOSIS — Z98.890 OTHER SPECIFIED POSTPROCEDURAL STATES: Chronic | ICD-10-CM

## 2023-06-14 DIAGNOSIS — Z34.93 ENCOUNTER FOR SUPERVISION OF NORMAL PREGNANCY, UNSPECIFIED, THIRD TRIMESTER: ICD-10-CM

## 2023-06-14 DIAGNOSIS — O26.899 OTHER SPECIFIED PREGNANCY RELATED CONDITIONS, UNSPECIFIED TRIMESTER: ICD-10-CM

## 2023-06-14 DIAGNOSIS — Z98.1 ARTHRODESIS STATUS: Chronic | ICD-10-CM

## 2023-06-14 LAB
CREAT 24H UR-MCNC: 1.2 G/24 H
CREAT 24H UR-MCNC: 1.2 G/24 H
CREAT ?TM UR-MCNC: 48 MG/DL
CREAT ?TM UR-MCNC: 48 MG/DL
PROT 24H UR-MRATE: 71 MG/DL
PROT ?TM UR-MCNC: 24 HR
PROT UR-MCNC: 1704 MG/24 H
SPECIMEN VOL 24H UR: 2400 ML
SPECIMEN VOL 24H UR: 2400 ML

## 2023-06-14 PROCEDURE — 76818 FETAL BIOPHYS PROFILE W/NST: CPT

## 2023-06-14 PROCEDURE — 0502F SUBSEQUENT PRENATAL CARE: CPT

## 2023-06-14 PROCEDURE — 76816 OB US FOLLOW-UP PER FETUS: CPT

## 2023-06-14 NOTE — OB RN TRIAGE NOTE - NS_OBGYNHISTORY_OBGYN_ALL_OB_FT
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

## 2023-06-14 NOTE — OB RN TRIAGE NOTE - MENTAL HEALTH CONDITIONS/SYMPTOMS, PROFILE
RX PROGRESS NOTE: Vancomycin Therapeutic Drug Monitoring      Indication for therapy: VAP/HCAP    ALLERGIES:   Allergen Reactions   • Alcohol   (Food Or Med) THROAT SWELLING   • Byetta PRURITUS   • Sitagliptin HIVES   • Sulfa Antibiotics HIVES   • Statins Other (See Comments)       Most recent height and weight information:  Weight: 83.4 kg (04/22/21 2015)  Height: 5' (152.4 cm) (04/22/21 2015)    The Following are the calculated  Current Weights for Venturi, June V            Adjusted Ideal    60.7 kg 45.5 kg             Labs:  Serum Creatinine and Creatinine Clearance:  Serum creatinine: 0.82 mg/dL 04/22/21 1338  Estimated creatinine clearance: 45.4 mL/min    Maximum Temperature (last 24 hours)     Value Max    Temp  99.3 °F (37.4 °C)        WBC (K/mcL)   Date/Time Value   04/22/2021 1338 8.8     Microbiology Results     None            Assessment/Plan:  Briefly, this is a 89 year old female started on vancomycin for  Vent/HCAP, with a target serum trough concentration of 10-15. Initial dosing regimen will be LD 1750mg and MD 1250mg q hs.     Pharmacy will monitor levels as appropriate.    Pharmacy will continue to monitor patient (renal function, microbiology data, risk factors for adverse events, appropriate duration of therapy), will order/monitor serum levels as appropriate, and will adjust dose if/when necessary.      Thank you,    Garcia Trejo RPH  4/22/2021 9:19 PM     controlled with biweekly therapy, meds prior to pregnancy/anxiety disorder/depression

## 2023-06-14 NOTE — OB RN TRIAGE NOTE - HOW PATIENT ADDRESSED, PROFILE
[facility] Adult  Hospitalist Group                                                                                          Hospitalist Progress Note  Elmo Gotti MD  Answering service: [phone] or [phone] from in house phone        Date of Service:  2020  NAME:  Stan Saba  :  1986  MRN:  [id number]      Admission Summary:   72-year-old man with past medical history significant for attention deficit hyperactivity disorder for which the patient is no longer taking any medication, who was in his usual state of health until the day of his presentation at the emergency room when the patient developed abdominal pain.  The onset of the abdominal pain was very sudden, located at the epigastric region.  The abdominal pain occur while at rest, constant sharp pain, 10/10 in severity with radiation to the back, associated with one episode of nausea and vomiting.  No known aggravating or relieving factors.  No prior episode of similar abdominal pain.  The patient denies fever, rigors, or chills.  The patient went to [facility] emergency room at [facility] for further evaluation.  When the patient arrived at the emergency room, the patient was found to have elevated amylase and lipase level.  The CT of the abdomen and pelvis shows evidence of acute pancreatitis as well as patchy bibasilar atelectasis or minimal infiltrate.  The patient was referred to the hospitalist service for evaluation for admission.  No record of prior admission to this hospital.  The patient denies sick contacts or exposure to any person with COVID virus infection.  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 Plan discussed with patient and his wife       Assessment & Plan:     Severe Acute pancreatitis  - Continue IV fluids   - Pain Management  - No clear reasons for pancreatitis-- likely medication induced since patient was taking PPI vs autoimmune hepatitis. He denied Etoh use and no gallstones on CT/US or ERCP.    - Triglyceride mild elevation   - GI following, appreciate input, EUS 5/6  - Cont Zosyn,continue Vanc, follow Blood Cx NGTD  - pancreatic Fluid Cx growing Ecoli, alpha strep, and enterococcus  - ID consulted, appreciate input  -NPO. - PICC line and TPN continue  -Contrast ct 5/9, no interval change  -Lipase trending down 900 today 5/12  -Can start CLD once lipase in 500s per GS     Cyst and pseudocyst of Pancreas  - Linear upper EUS done 05/06/20  - 100ml fluid aspirated from cyst   - cont Zosyn started on vanc  - NPO   - Surgery Consulted, appreciate input  - PICC line and Continue TPN      # Possible paralytic ileus   - NGT removed.  -resolved     # Acute renal failure   # Non anion gap metabolic acidosis   Likely from pre-renal from volume depletion from pancreatitis +/- contrast induced   Resolved , monitor  - Continue IV fluids  - Strict I & O  - avoid nephrotoxin drugs and renally dose others   - Nephrology available if needed     # Hyperkalemia   - from acute renal failure   - resolved   Potassium 4.3 05/12/20      - Monitor labs        # Hypomagnesemia  - resolved  - Monitor labs      # Hypocalcemia    - Resolved  - Monitor labs      # Sinus tachycardia  - Likely secondary to dehydration continue hydration,  -  Cardiology following   - Continue Lopressor  75mg PO BID  - Echo- Normal cavity size, wall thickness, systolic function (ejection fraction normal) and diastolic function. Estimated left ventricular ejection fraction is 60 - 65%.      # Constipation  - Resolved  - Continue Miralax BID  - Bisacodyl BID      # Leukocytosis  - Likely reactive  - Resolved     # Elevated blood pressure without history of hypertension  - controlled     # History of ADHD  - Stable        Code status: Full   DVT prophylaxis: Heparin      Care Plan discussed with: Patient/Family and Nurse  Anticipated Disposition: Home w/Family  Anticipated Discharge: Greater than 48 hours        Hospital Problems  Date Reviewed: 5/7/2020          Codes Class Noted POA    Severe protein-calorie malnutrition ([facility] [address] ICD-10-CM: Z36  ICD-9-CM: 625  5/7/2020 No        * (Principal) Acute pancreatitis ICD-10-CM: K85.90  ICD-9-CM: 274.5  4/26/2020 Yes                Review of Systems:   A comprehensive review of systems was negative except for that written in the HPI. Vital Signs:    Last 24hrs VS reviewed since prior progress note. Most recent are:  Visit Vitals  /76 (BP 1 Location: Left arm, BP Patient Position: At rest)   Pulse (!) 114   Temp 99 °F (37.2 °C)   Resp 16   Ht 5' 5\" (1.651 m)   Wt 95 kg (209 lb 7 oz)   SpO2 97%   BMI 34.85 kg/m²         Intake/Output Summary (Last 24 hours) at 5/12/2020 1012  Last data filed at 5/11/2020 1922  Gross per 24 hour   Intake    Output 600 ml   Net -600 ml        Physical Examination:             Constitutional:  No acute distress, cooperative, pleasant    ENT:  Oral mucosa moist, oropharynx benign. Resp:  CTA bilaterally. No wheezing/rhonchi/rales. No accessory muscle use   CV:  Regular rhythm, normal rate, no murmurs, gallops, rubs    GI:  Soft, non distended,tender +. normoactive bowel sounds,    Musculoskeletal:  No edema, warm, 2+ pulses throughout    Neurologic:  Moves all extremities.   AAOx3, CN II-XII reviewed            Data Review:    Review and/or order of clinical lab test      Labs:     Recent Labs     05/11/20  0335 05/10/20  0641   WBC 9.3 9.7   HGB 8.8* 9.2*   HCT 28.4* 29.7*   * 420*     Recent Labs     05/12/20  0608 05/11/20  0335 05/10/20  0641   * 131* 131* K 4.1 4.2 4.3    102 103   CO2 23 23 21   BUN 10 10 10   CREA 0.51* 0.50* 0.60*   * 114* 108*   CA 8.6 8.4* 8.2*   MG  --   --  1.8   PHOS  --   --  2.4*     Recent Labs     05/12/20  0608 05/11/20  0335 05/10/20  0641   SGOT 52*  --   --    ALT 22  --   --    *  --   --    TBILI 0.7  --   --    TP 6.1*  --   --    ALB 1.9*  --   --    GLOB 4.2*  --   --    LPSE 901* 984* 1,058*     No results for input(s): INR, PTP, APTT, INREXT, INREXT in the last 72 hours. No results for input(s): FE, TIBC, PSAT, FERR in the last 72 hours. Lab Results   Component Value Date/Time    Folate 1.7 (L) 05/04/2020 02:43 AM      No results for input(s): PH, PCO2, PO2 in the last 72 hours. No results for input(s): CPK, CKNDX, TROIQ in the last 72 hours.     No lab exists for component: CPKMB  Lab Results   Component Value Date/Time    Cholesterol, total 194 04/27/2020 05:48 AM    HDL Cholesterol 16 04/27/2020 05:48 AM    LDL, calculated 140.6 (H) 04/27/2020 05:48 AM    Triglyceride 187 (H) 04/27/2020 05:48 AM    CHOL/HDL Ratio 12.1 (H) 04/27/2020 05:48 AM     Lab Results   Component Value Date/Time    Glucose (POC) 112 (H) 05/12/2020 06:07 AM    Glucose (POC) 112 (H) 05/12/2020 12:06 AM    Glucose (POC) 108 (H) 05/11/2020 06:22 PM    Glucose (POC) 120 (H) 05/11/2020 11:40 AM    Glucose (POC) 110 (H) 05/11/2020 06:17 AM     Lab Results   Component Value Date/Time    Color DELPHINE 04/28/2020 09:31 PM    Appearance CLOUDY (A) 04/28/2020 09:31 PM    Specific gravity 1.023 04/28/2020 09:31 PM    pH (UA) 5.0 04/28/2020 09:31 PM    Protein 30 (A) 04/28/2020 09:31 PM    Glucose Negative 04/28/2020 09:31 PM    Ketone TRACE (A) 04/28/2020 09:31 PM    Bilirubin Negative 04/26/2020 05:23 PM    Urobilinogen 1.0 04/28/2020 09:31 PM    Nitrites Positive (A) 04/28/2020 09:31 PM    Leukocyte Esterase SMALL (A) 04/28/2020 09:31 PM    Epithelial cells FEW 04/28/2020 09:31 PM    Bacteria Negative 04/28/2020 09:31 PM    WBC 5-10 04/28/2020 09:31 PM    RBC 0-5 04/28/2020 09:31 PM         Medications Reviewed:     Current Facility-Administered Medications   Medication Dose Route Frequency    TPN ADULT - CENTRAL   IntraVENous CONTINUOUS    vancomycin (VANCOCIN) 1750 mg in  ml infusion  1,750 mg IntraVENous Q8H    HYDROmorphone (PF) (DILAUDID) injection 1 mg  1 mg IntraVENous Q6H PRN    oxyCODONE-acetaminophen (PERCOCET 7.5) 7.5-325 mg per tablet 1 Tab  1 Tab Oral Q4H PRN    Vancomycin - Pharmacy to Dose   Other Rx Dosing/Monitoring    sodium chloride (NS) flush 5-40 mL  5-40 mL IntraVENous PRN    sodium chloride (NS) flush 5-40 mL  5-40 mL IntraVENous PRN    piperacillin-tazobactam (ZOSYN) 3.375 g in 0.9% sodium chloride (MBP/ADV) 100 mL  3.375 g IntraVENous Q8H    acetaminophen (TYLENOL) tablet 650 mg  650 mg Oral Q6H PRN    polyethylene glycol (MIRALAX) packet 17 g  17 g Oral DAILY PRN    potassium chloride SR (KLOR-CON 10) tablet 40 mEq  40 mEq Oral DAILY    pantoprazole (PROTONIX) tablet 40 mg  40 mg Oral ACD    cholecalciferol (VITAMIN D3) (1000 Units /25 mcg) tablet 5 Tab  5,000 Units Oral DAILY    bisacodyL (DULCOLAX) tablet 5 mg  5 mg Oral BID    metoprolol tartrate (LOPRESSOR) tablet 75 mg  75 mg Oral Q12H    lactated Ringers infusion  50 mL/hr IntraVENous CONTINUOUS    metoprolol (LOPRESSOR) injection 2.5 mg  2.5 mg IntraVENous Q6H PRN    sodium chloride (NS) flush 5-40 mL  5-40 mL IntraVENous Q8H    sodium chloride (NS) flush 5-40 mL  5-40 mL IntraVENous PRN    acetaminophen (TYLENOL) solution 650 mg  650 mg Oral Q4H PRN    ondansetron (ZOFRAN) injection 4 mg  4 mg IntraVENous Q4H PRN    heparin (porcine) injection 5,000 Units  5,000 Units SubCUTAneous Q8H     ______________________________________________________________________  EXPECTED LENGTH OF STAY: 4d 14h  ACTUAL LENGTH OF STAY:          16                 Belén Loya MD [place]

## 2023-06-14 NOTE — OB RN TRIAGE NOTE - FALL HARM RISK - UNIVERSAL INTERVENTIONS
Bed in lowest position, wheels locked, appropriate side rails in place/Call bell, personal items and telephone in reach/Instruct patient to call for assistance before getting out of bed or chair/Non-slip footwear when patient is out of bed/[place] to call system/Physically safe environment - no spills, clutter or unnecessary equipment/Purposeful Proactive Rounding/Room/bathroom lighting operational, light cord in reach

## 2023-06-15 DIAGNOSIS — O16.9 UNSPECIFIED MATERNAL HYPERTENSION, UNSPECIFIED TRIMESTER: ICD-10-CM

## 2023-06-15 DIAGNOSIS — R03.0 ELEVATED BLOOD-PRESSURE READING, WITHOUT DIAGNOSIS OF HYPERTENSION: ICD-10-CM

## 2023-06-15 LAB
ALBUMIN SERPL ELPH-MCNC: 2.8 G/DL — LOW (ref 3.3–5)
ALBUMIN SERPL ELPH-MCNC: 2.9 G/DL — LOW (ref 3.3–5)
ALBUMIN SERPL ELPH-MCNC: 3.2 G/DL — LOW (ref 3.3–5)
ALP SERPL-CCNC: 125 U/L — HIGH (ref 40–120)
ALP SERPL-CCNC: 130 U/L — HIGH (ref 40–120)
ALP SERPL-CCNC: 139 U/L — HIGH (ref 40–120)
ALT FLD-CCNC: 17 U/L — SIGNIFICANT CHANGE UP (ref 4–33)
ALT FLD-CCNC: 20 U/L — SIGNIFICANT CHANGE UP (ref 4–33)
ALT FLD-CCNC: 20 U/L — SIGNIFICANT CHANGE UP (ref 4–33)
ANION GAP SERPL CALC-SCNC: 10 MMOL/L — SIGNIFICANT CHANGE UP (ref 7–14)
ANION GAP SERPL CALC-SCNC: 11 MMOL/L — SIGNIFICANT CHANGE UP (ref 7–14)
ANION GAP SERPL CALC-SCNC: 15 MMOL/L — HIGH (ref 7–14)
APPEARANCE UR: ABNORMAL
APTT BLD: 28.1 SEC — SIGNIFICANT CHANGE UP (ref 27–36.3)
APTT BLD: 28.2 SEC — SIGNIFICANT CHANGE UP (ref 27–36.3)
APTT BLD: 29.2 SEC — SIGNIFICANT CHANGE UP (ref 27–36.3)
AST SERPL-CCNC: 24 U/L — SIGNIFICANT CHANGE UP (ref 4–32)
AST SERPL-CCNC: 26 U/L — SIGNIFICANT CHANGE UP (ref 4–32)
AST SERPL-CCNC: 27 U/L — SIGNIFICANT CHANGE UP (ref 4–32)
BACTERIA # UR AUTO: ABNORMAL
BASOPHILS # BLD AUTO: 0.04 K/UL — SIGNIFICANT CHANGE UP (ref 0–0.2)
BASOPHILS # BLD AUTO: 0.05 K/UL — SIGNIFICANT CHANGE UP (ref 0–0.2)
BASOPHILS # BLD AUTO: 0.05 K/UL — SIGNIFICANT CHANGE UP (ref 0–0.2)
BASOPHILS NFR BLD AUTO: 0.5 % — SIGNIFICANT CHANGE UP (ref 0–2)
BILIRUB SERPL-MCNC: 0.2 MG/DL — SIGNIFICANT CHANGE UP (ref 0.2–1.2)
BILIRUB SERPL-MCNC: 0.3 MG/DL — SIGNIFICANT CHANGE UP (ref 0.2–1.2)
BILIRUB SERPL-MCNC: <0.2 MG/DL — SIGNIFICANT CHANGE UP (ref 0.2–1.2)
BILIRUB UR-MCNC: NEGATIVE — SIGNIFICANT CHANGE UP
BLD GP AB SCN SERPL QL: NEGATIVE — SIGNIFICANT CHANGE UP
BUN SERPL-MCNC: 16 MG/DL — SIGNIFICANT CHANGE UP (ref 7–23)
BUN SERPL-MCNC: 17 MG/DL — SIGNIFICANT CHANGE UP (ref 7–23)
BUN SERPL-MCNC: 19 MG/DL — SIGNIFICANT CHANGE UP (ref 7–23)
CALCIUM SERPL-MCNC: 7.9 MG/DL — LOW (ref 8.4–10.5)
CALCIUM SERPL-MCNC: 8 MG/DL — LOW (ref 8.4–10.5)
CALCIUM SERPL-MCNC: 8.5 MG/DL — SIGNIFICANT CHANGE UP (ref 8.4–10.5)
CHLORIDE SERPL-SCNC: 105 MMOL/L — SIGNIFICANT CHANGE UP (ref 98–107)
CHLORIDE SERPL-SCNC: 106 MMOL/L — SIGNIFICANT CHANGE UP (ref 98–107)
CHLORIDE SERPL-SCNC: 109 MMOL/L — HIGH (ref 98–107)
CO2 SERPL-SCNC: 18 MMOL/L — LOW (ref 22–31)
CO2 SERPL-SCNC: 19 MMOL/L — LOW (ref 22–31)
CO2 SERPL-SCNC: 19 MMOL/L — LOW (ref 22–31)
COLOR SPEC: YELLOW — SIGNIFICANT CHANGE UP
COVID-19 SPIKE DOMAIN AB INTERP: POSITIVE
COVID-19 SPIKE DOMAIN ANTIBODY RESULT: 209 U/ML — HIGH
CREAT ?TM UR-MCNC: 253 MG/DL — SIGNIFICANT CHANGE UP
CREAT SERPL-MCNC: 0.87 MG/DL — SIGNIFICANT CHANGE UP (ref 0.5–1.3)
CREAT SERPL-MCNC: 0.95 MG/DL — SIGNIFICANT CHANGE UP (ref 0.5–1.3)
CREAT SERPL-MCNC: 1 MG/DL — SIGNIFICANT CHANGE UP (ref 0.5–1.3)
DIFF PNL FLD: ABNORMAL
EGFR: 80 ML/MIN/1.73M2 — SIGNIFICANT CHANGE UP
EGFR: 85 ML/MIN/1.73M2 — SIGNIFICANT CHANGE UP
EGFR: 95 ML/MIN/1.73M2 — SIGNIFICANT CHANGE UP
EOSINOPHIL # BLD AUTO: 0.07 K/UL — SIGNIFICANT CHANGE UP (ref 0–0.5)
EOSINOPHIL # BLD AUTO: 0.07 K/UL — SIGNIFICANT CHANGE UP (ref 0–0.5)
EOSINOPHIL # BLD AUTO: 0.12 K/UL — SIGNIFICANT CHANGE UP (ref 0–0.5)
EOSINOPHIL NFR BLD AUTO: 0.7 % — SIGNIFICANT CHANGE UP (ref 0–6)
EOSINOPHIL NFR BLD AUTO: 0.8 % — SIGNIFICANT CHANGE UP (ref 0–6)
EOSINOPHIL NFR BLD AUTO: 1.6 % — SIGNIFICANT CHANGE UP (ref 0–6)
EPI CELLS # UR: 4 /HPF — SIGNIFICANT CHANGE UP (ref 0–5)
FIBRINOGEN PPP-MCNC: 319 MG/DL — SIGNIFICANT CHANGE UP (ref 200–465)
FIBRINOGEN PPP-MCNC: 345 MG/DL — SIGNIFICANT CHANGE UP (ref 200–465)
GLUCOSE BLDC GLUCOMTR-MCNC: 63 MG/DL — LOW (ref 70–99)
GLUCOSE BLDC GLUCOMTR-MCNC: 64 MG/DL — LOW (ref 70–99)
GLUCOSE BLDC GLUCOMTR-MCNC: 79 MG/DL — SIGNIFICANT CHANGE UP (ref 70–99)
GLUCOSE BLDC GLUCOMTR-MCNC: 83 MG/DL — SIGNIFICANT CHANGE UP (ref 70–99)
GLUCOSE BLDC GLUCOMTR-MCNC: 84 MG/DL — SIGNIFICANT CHANGE UP (ref 70–99)
GLUCOSE SERPL-MCNC: 102 MG/DL — HIGH (ref 70–99)
GLUCOSE SERPL-MCNC: 75 MG/DL — SIGNIFICANT CHANGE UP (ref 70–99)
GLUCOSE SERPL-MCNC: 76 MG/DL — SIGNIFICANT CHANGE UP (ref 70–99)
GLUCOSE UR QL: NEGATIVE — SIGNIFICANT CHANGE UP
HCT VFR BLD CALC: 35.7 % — SIGNIFICANT CHANGE UP (ref 34.5–45)
HCT VFR BLD CALC: 37.3 % — SIGNIFICANT CHANGE UP (ref 34.5–45)
HCT VFR BLD CALC: 37.5 % — SIGNIFICANT CHANGE UP (ref 34.5–45)
HGB BLD-MCNC: 11.7 G/DL — SIGNIFICANT CHANGE UP (ref 11.5–15.5)
HGB BLD-MCNC: 12.2 G/DL — SIGNIFICANT CHANGE UP (ref 11.5–15.5)
HGB BLD-MCNC: 12.5 G/DL — SIGNIFICANT CHANGE UP (ref 11.5–15.5)
HYALINE CASTS # UR AUTO: 20 /LPF — HIGH (ref 0–7)
IANC: 5.21 K/UL — SIGNIFICANT CHANGE UP (ref 1.8–7.4)
IANC: 6.89 K/UL — SIGNIFICANT CHANGE UP (ref 1.8–7.4)
IANC: 7.97 K/UL — HIGH (ref 1.8–7.4)
IMM GRANULOCYTES NFR BLD AUTO: 0.5 % — SIGNIFICANT CHANGE UP (ref 0–0.9)
IMM GRANULOCYTES NFR BLD AUTO: 0.8 % — SIGNIFICANT CHANGE UP (ref 0–0.9)
IMM GRANULOCYTES NFR BLD AUTO: 0.8 % — SIGNIFICANT CHANGE UP (ref 0–0.9)
INR BLD: 0.91 RATIO — SIGNIFICANT CHANGE UP (ref 0.88–1.16)
INR BLD: 0.91 RATIO — SIGNIFICANT CHANGE UP (ref 0.88–1.16)
INR BLD: <0.9 RATIO — SIGNIFICANT CHANGE UP (ref 0.88–1.16)
KETONES UR-MCNC: NEGATIVE — SIGNIFICANT CHANGE UP
LDH SERPL L TO P-CCNC: 208 U/L — SIGNIFICANT CHANGE UP (ref 135–225)
LDH SERPL L TO P-CCNC: 219 U/L — SIGNIFICANT CHANGE UP (ref 135–225)
LDH SERPL L TO P-CCNC: 233 U/L — HIGH (ref 135–225)
LEUKOCYTE ESTERASE UR-ACNC: NEGATIVE — SIGNIFICANT CHANGE UP
LYMPHOCYTES # BLD AUTO: 1.62 K/UL — SIGNIFICANT CHANGE UP (ref 1–3.3)
LYMPHOCYTES # BLD AUTO: 1.65 K/UL — SIGNIFICANT CHANGE UP (ref 1–3.3)
LYMPHOCYTES # BLD AUTO: 1.65 K/UL — SIGNIFICANT CHANGE UP (ref 1–3.3)
LYMPHOCYTES # BLD AUTO: 15.6 % — SIGNIFICANT CHANGE UP (ref 13–44)
LYMPHOCYTES # BLD AUTO: 17.8 % — SIGNIFICANT CHANGE UP (ref 13–44)
LYMPHOCYTES # BLD AUTO: 22 % — SIGNIFICANT CHANGE UP (ref 13–44)
MCHC RBC-ENTMCNC: 30 PG — SIGNIFICANT CHANGE UP (ref 27–34)
MCHC RBC-ENTMCNC: 30.3 PG — SIGNIFICANT CHANGE UP (ref 27–34)
MCHC RBC-ENTMCNC: 30.5 PG — SIGNIFICANT CHANGE UP (ref 27–34)
MCHC RBC-ENTMCNC: 32.5 GM/DL — SIGNIFICANT CHANGE UP (ref 32–36)
MCHC RBC-ENTMCNC: 32.8 GM/DL — SIGNIFICANT CHANGE UP (ref 32–36)
MCHC RBC-ENTMCNC: 33.5 GM/DL — SIGNIFICANT CHANGE UP (ref 32–36)
MCV RBC AUTO: 90.5 FL — SIGNIFICANT CHANGE UP (ref 80–100)
MCV RBC AUTO: 91.5 FL — SIGNIFICANT CHANGE UP (ref 80–100)
MCV RBC AUTO: 93.8 FL — SIGNIFICANT CHANGE UP (ref 80–100)
MONOCYTES # BLD AUTO: 0.43 K/UL — SIGNIFICANT CHANGE UP (ref 0–0.9)
MONOCYTES # BLD AUTO: 0.53 K/UL — SIGNIFICANT CHANGE UP (ref 0–0.9)
MONOCYTES # BLD AUTO: 0.6 K/UL — SIGNIFICANT CHANGE UP (ref 0–0.9)
MONOCYTES NFR BLD AUTO: 5.7 % — SIGNIFICANT CHANGE UP (ref 2–14)
MONOCYTES NFR BLD AUTO: 5.7 % — SIGNIFICANT CHANGE UP (ref 2–14)
MONOCYTES NFR BLD AUTO: 5.8 % — SIGNIFICANT CHANGE UP (ref 2–14)
NEUTROPHILS # BLD AUTO: 5.21 K/UL — SIGNIFICANT CHANGE UP (ref 1.8–7.4)
NEUTROPHILS # BLD AUTO: 6.89 K/UL — SIGNIFICANT CHANGE UP (ref 1.8–7.4)
NEUTROPHILS # BLD AUTO: 7.97 K/UL — HIGH (ref 1.8–7.4)
NEUTROPHILS NFR BLD AUTO: 69.4 % — SIGNIFICANT CHANGE UP (ref 43–77)
NEUTROPHILS NFR BLD AUTO: 74.4 % — SIGNIFICANT CHANGE UP (ref 43–77)
NEUTROPHILS NFR BLD AUTO: 76.9 % — SIGNIFICANT CHANGE UP (ref 43–77)
NITRITE UR-MCNC: NEGATIVE — SIGNIFICANT CHANGE UP
NRBC # BLD: 0 /100 WBCS — SIGNIFICANT CHANGE UP (ref 0–0)
NRBC # FLD: 0 K/UL — SIGNIFICANT CHANGE UP (ref 0–0)
PH UR: 6.5 — SIGNIFICANT CHANGE UP (ref 5–8)
PLATELET # BLD AUTO: 112 K/UL — LOW (ref 150–400)
PLATELET # BLD AUTO: 116 K/UL — LOW (ref 150–400)
PLATELET # BLD AUTO: 135 K/UL — LOW (ref 150–400)
POTASSIUM SERPL-MCNC: 4 MMOL/L — SIGNIFICANT CHANGE UP (ref 3.5–5.3)
POTASSIUM SERPL-MCNC: 4.3 MMOL/L — SIGNIFICANT CHANGE UP (ref 3.5–5.3)
POTASSIUM SERPL-MCNC: 4.7 MMOL/L — SIGNIFICANT CHANGE UP (ref 3.5–5.3)
POTASSIUM SERPL-SCNC: 4 MMOL/L — SIGNIFICANT CHANGE UP (ref 3.5–5.3)
POTASSIUM SERPL-SCNC: 4.3 MMOL/L — SIGNIFICANT CHANGE UP (ref 3.5–5.3)
POTASSIUM SERPL-SCNC: 4.7 MMOL/L — SIGNIFICANT CHANGE UP (ref 3.5–5.3)
PROT ?TM UR-MCNC: 867 MG/DL — SIGNIFICANT CHANGE UP
PROT ?TM UR-MCNC: 867 MG/DL — SIGNIFICANT CHANGE UP
PROT SERPL-MCNC: 5.1 G/DL — LOW (ref 6–8.3)
PROT SERPL-MCNC: 5.4 G/DL — LOW (ref 6–8.3)
PROT SERPL-MCNC: 5.7 G/DL — LOW (ref 6–8.3)
PROT UR-MCNC: ABNORMAL
PROT/CREAT UR-RTO: 3.4 RATIO — HIGH (ref 0–0.2)
PROTHROM AB SERPL-ACNC: 10.2 SEC — LOW (ref 10.5–13.4)
PROTHROM AB SERPL-ACNC: 10.5 SEC — SIGNIFICANT CHANGE UP (ref 10.5–13.4)
PROTHROM AB SERPL-ACNC: 10.6 SEC — SIGNIFICANT CHANGE UP (ref 10.5–13.4)
RBC # BLD: 3.9 M/UL — SIGNIFICANT CHANGE UP (ref 3.8–5.2)
RBC # BLD: 4 M/UL — SIGNIFICANT CHANGE UP (ref 3.8–5.2)
RBC # BLD: 4.12 M/UL — SIGNIFICANT CHANGE UP (ref 3.8–5.2)
RBC # FLD: 13.8 % — SIGNIFICANT CHANGE UP (ref 10.3–14.5)
RBC # FLD: 14 % — SIGNIFICANT CHANGE UP (ref 10.3–14.5)
RBC # FLD: 14 % — SIGNIFICANT CHANGE UP (ref 10.3–14.5)
RBC CASTS # UR COMP ASSIST: 5 /HPF — HIGH (ref 0–4)
RH IG SCN BLD-IMP: POSITIVE — SIGNIFICANT CHANGE UP
SARS-COV-2 IGG+IGM SERPL QL IA: 209 U/ML — HIGH
SARS-COV-2 IGG+IGM SERPL QL IA: POSITIVE
SODIUM SERPL-SCNC: 136 MMOL/L — SIGNIFICANT CHANGE UP (ref 135–145)
SODIUM SERPL-SCNC: 138 MMOL/L — SIGNIFICANT CHANGE UP (ref 135–145)
SODIUM SERPL-SCNC: 138 MMOL/L — SIGNIFICANT CHANGE UP (ref 135–145)
SP GR SPEC: 1.03 — SIGNIFICANT CHANGE UP (ref 1.01–1.05)
URATE SERPL-MCNC: 9.1 MG/DL — HIGH (ref 2.5–7)
URATE SERPL-MCNC: 9.3 MG/DL — HIGH (ref 2.5–7)
URATE SERPL-MCNC: 9.3 MG/DL — HIGH (ref 2.5–7)
UROBILINOGEN FLD QL: SIGNIFICANT CHANGE UP
WBC # BLD: 10.36 K/UL — SIGNIFICANT CHANGE UP (ref 3.8–10.5)
WBC # BLD: 7.51 K/UL — SIGNIFICANT CHANGE UP (ref 3.8–10.5)
WBC # BLD: 9.26 K/UL — SIGNIFICANT CHANGE UP (ref 3.8–10.5)
WBC # FLD AUTO: 10.36 K/UL — SIGNIFICANT CHANGE UP (ref 3.8–10.5)
WBC # FLD AUTO: 7.51 K/UL — SIGNIFICANT CHANGE UP (ref 3.8–10.5)
WBC # FLD AUTO: 9.26 K/UL — SIGNIFICANT CHANGE UP (ref 3.8–10.5)
WBC UR QL: 35 /HPF — HIGH (ref 0–5)

## 2023-06-15 RX ORDER — ONDANSETRON 8 MG/1
4 TABLET, FILM COATED ORAL ONCE
Refills: 0 | Status: COMPLETED | OUTPATIENT
Start: 2023-06-15 | End: 2023-06-15

## 2023-06-15 RX ORDER — SODIUM CHLORIDE 9 MG/ML
1000 INJECTION INTRAMUSCULAR; INTRAVENOUS; SUBCUTANEOUS
Refills: 0 | Status: DISCONTINUED | OUTPATIENT
Start: 2023-06-15 | End: 2023-06-17

## 2023-06-15 RX ORDER — SODIUM CHLORIDE 9 MG/ML
1000 INJECTION, SOLUTION INTRAVENOUS
Refills: 0 | Status: DISCONTINUED | OUTPATIENT
Start: 2023-06-15 | End: 2023-06-17

## 2023-06-15 RX ORDER — DOXYLAMINE SUCCINATE AND PYRIDOXINE HYDROCHLORIDE, DELAYED RELEASE TABLETS 10 MG/10 MG 10; 10 MG/1; MG/1
2 TABLET, DELAYED RELEASE ORAL
Refills: 0 | DISCHARGE

## 2023-06-15 RX ORDER — NORETHINDRONE AND ETHINYL ESTRADIOL 0.4-0.035
1 KIT ORAL
Qty: 0 | Refills: 0 | DISCHARGE

## 2023-06-15 RX ORDER — CHLORHEXIDINE GLUCONATE 213 G/1000ML
1 SOLUTION TOPICAL DAILY
Refills: 0 | Status: DISCONTINUED | OUTPATIENT
Start: 2023-06-15 | End: 2023-06-17

## 2023-06-15 RX ORDER — OXYTOCIN 10 UNIT/ML
333.33 VIAL (ML) INJECTION
Qty: 20 | Refills: 0 | Status: DISCONTINUED | OUTPATIENT
Start: 2023-06-15 | End: 2023-06-17

## 2023-06-15 RX ORDER — SODIUM CHLORIDE 9 MG/ML
300 INJECTION, SOLUTION INTRAVENOUS ONCE
Refills: 0 | Status: DISCONTINUED | OUTPATIENT
Start: 2023-06-15 | End: 2023-06-15

## 2023-06-15 RX ORDER — SODIUM CHLORIDE 9 MG/ML
1000 INJECTION, SOLUTION INTRAVENOUS
Refills: 0 | Status: DISCONTINUED | OUTPATIENT
Start: 2023-06-15 | End: 2023-06-15

## 2023-06-15 RX ORDER — DIPHENHYDRAMINE HCL 50 MG
25 CAPSULE ORAL ONCE
Refills: 0 | Status: COMPLETED | OUTPATIENT
Start: 2023-06-15 | End: 2023-06-15

## 2023-06-15 RX ADMIN — SODIUM CHLORIDE 125 MILLILITER(S): 9 INJECTION, SOLUTION INTRAVENOUS at 02:48

## 2023-06-15 RX ADMIN — ONDANSETRON 4 MILLIGRAM(S): 8 TABLET, FILM COATED ORAL at 18:00

## 2023-06-15 RX ADMIN — Medication 25 MILLIGRAM(S): at 08:15

## 2023-06-15 RX ADMIN — CHLORHEXIDINE GLUCONATE 1 APPLICATION(S): 213 SOLUTION TOPICAL at 16:08

## 2023-06-15 NOTE — OB PROVIDER H&P - BLOOD TRANSFUSION, PREVIOUS, PROFILE
[facility] AMBULATORY ENCOUNTER  URGENT CARE OFFICE VISIT    SUBJECTIVE:    Jessica A Reyes Hernandez is a 34 year old year old  woman, who is 12 weeks pregnant and just had a urine culture done on February 12th by her OB/GYN which was negative, who [facility] today with urinary complaints of frequency starting 2 days ago and yesterday with dysuria and lower abdominal discomfort, but also has urgency, nocturia 4-5 times per night and some odor to her urine at the end of urination.  She denies hematuria, voiding in small amounts, incontinence, low back pain, nausea or vomiting and fever or chills.  She does have some abdominal discomfort after eating, more related to her pregnancy, but denies true morning sickness.  She has had no problems with her pregnancy so far.      The patient does  have a history of previous urinary tract infection.  Last urinary tract infection was in November, but urine showed no growth.      REVIEW OF SYSTEMS:    A review of systems was performed and findings relevant to these symptoms are included in the History of Present Illness.    PAST HISTORIES:  ALLERGIES:  No Known Allergies    Current Outpatient Medications   Medication Sig Dispense Refill   • Prenatal Vit-Fe Fumarate-FA (PRENATAL 19 PO)      • Probiotic Product (FLORAJEN3 PO)      • cephalexin (KEFLEX) 500 MG capsule Take 1 capsule by mouth 4 times daily for 5 days. 20 capsule 0     No current facility-administered medications for this visit.        I have reviewed the past medical history, family history, social history, medications and allergies listed in the medical record as obtained by my nursing staff and support staff and agree with their documentation    OBJECTIVE:  PHYSICAL EXAM:    Visit Vitals  /59 (BP Location: RUE - Right upper extremity, Patient Position: Sitting, Cuff Size: Regular)   Pulse 84   Temp 98 °F (36.7 °C) (Temporal)   Resp 18   LMP 12/05/2020   SpO2 99%      General:   Well-developed, well-nourished,  woman who appears to be in no acute distress, alert and oriented x3, happy and pleasant.  Lungs: Clear to auscultation posteriorly bilaterally.  Heart:: Regular rate and rhythm without murmur.  Abdomen:  Normal bowel sounds, soft, mild suprapubic tenderness but also to both right and left adnexa, no rebound, no guarding, no masses, no hepatomegaly or splenomegaly and no hernia.  Back: No costovertebral angle tenderness bilaterally.  Skin:  Normal turgor and no rash.      DIAGNOSTICS:  Urine dipstick reveals   Walk In on 02/26/2021   Component Date Value Ref Range Status   • POCT Color 02/26/2021 Yellow   Final   • POCT Appearance 02/26/2021 Cloudy   Final   • POCT Glucose Urine 02/26/2021 Negative  Negative Final   • POCT Bilirubin 02/26/2021 Negative  Negative Final   • POCT Ketones 02/26/2021 Negative  Negative Final   • POCT Specific Gravity 02/26/2021 1.025  1.005 - 1.03 Final   • POCT Occult Blood 02/26/2021 Trace - Lysed  Negative Final   • POCT pH 02/26/2021 7.0  5 - 9 Final   • POCT Protein 02/26/2021 Negative  Negative Final   • POCT Urobilinogen 02/26/2021 0.2  0 - 1 mg/dL Final   • Urine Nitrite 02/26/2021 Negative  Negative Final   • WBC (Leukocyte) Esterase POC 02/26/2021 Moderate  Negative Final        Urine culture is pending.    ASSESSMENT:   1. Acute cystitis without hematuria    2. Dysuria        I told just because the based on her history, my exam and urine dipstick results she most likely has a urinary tract infection, especially since he had a normal UA and culture done earlier this month by her OB/GYN.  I told her antibiotics that I will give her will be safe to take while she is pregnant as the usual ones that we give may not be.    PLAN:  Orders Placed This Encounter   • POCT Urine Dip Auto   • Urine, Bacterial Culture   • cephalexin (KEFLEX) 500 MG capsule       EDUCATIONAL HANDOUTS GIVEN TO PATIENT AND REVIEWED:  Urinary Tract Infections in  Women.    PATIENT INSTRUCTIONS REVIEWED WITH THE PATIENT:  My SmartPhrase which recommends taking all the antibiotics, drinking plenty of fluids, to be called with the urine culture results in 2-3 days, using cranberry juice/tablets for treatment and prevention and when to follow-up with primary care provider.      The patient indicated understanding of the diagnosis and agreed with the plan of care.     .       no

## 2023-06-15 NOTE — OB PROVIDER TRIAGE NOTE - NSHPPHYSICALEXAM_GEN_ALL_CORE
abd soft gravid NT  CV RRR  LS clear bilaterally  TAS: images saved on sono  vertex  SSE: negative for lesions  SVE: long closed posterior   FHT: moderate variability + accelerations negative decelerations  toco: irregular pt not feeling 0/10 pain  Vital Signs Last 24 Hrs  T(C): 36.9 (14 Jun 2023 23:41), Max: 36.9 (14 Jun 2023 23:40)  T(F): 98.4 (14 Jun 2023 23:41), Max: 98.42 (14 Jun 2023 23:40)  HR: 79 (15 Madhu 2023 00:27) (79 - 98)  BP: 127/81 (15 Madhu 2023 00:27) (125/89 - 138/90)  BP(mean): --  RR: 15 (14 Jun 2023 23:41) (15 - 16)  SpO2: --

## 2023-06-15 NOTE — OB PROVIDER H&P - PROBLEM SELECTOR PLAN 1
d/w Dr Benitez admit for IOL for PEC @ 38 wks   for buccal cytotec for IOL  for cervical balloon  HELLP labs sent  BP monitoring  prenatals reviewed  repeat VE  pain medication as needed

## 2023-06-15 NOTE — OB PROVIDER IHI INDUCTION/AUGMENTATION NOTE - NS_FINALEDD_OBGYN_ALL_OB_DT
[FreeTextEntry1] : This is a 86 year old female with  CLEO/central sleep apnea on ASV  and narcolepsy type I here for a follow up visit. She is now no longer having status cataplecticus. My suspicion is that she was noncompliant with her venlafaxine which caused her cataplexy to spiral out of control. We discussed at length with the patient and her daughter the important of taking her medication daily. We also discussed the need for frequent daytime naps in order to have the feeling of being awake during the day. She can continue to use the armodafinil as needed but will need frequent naps throughout the day. \par \par We discussed the use of other medications if needed as Xyrem and now Pitolisant. The patient and daughter opt to stay on venlafaxine at this time. We also discussed the use of CBD or THC for an increase in her quality of life. \par \par We discussed the need for using her ASV machine whenever she sleeps more than 30 minutes and the importance it places in restorative sleep. \par \par She is asked to do all these measures and see if her quality of life improves. she will follow up with me in 3-6 months or sooner if needed.  28-Jun-2023

## 2023-06-15 NOTE — OB PROVIDER LABOR PROGRESS NOTE - ASSESSMENT
26 y/o  @38+1w PEC IOL hellp labs from 10a besides Plts downtrending 135 -> 112, creatinine 1.0 -> 0.87   PCR 3.4 but contaminated with blood     Continue buccal cytotec    RUTH amos       26 y/o  @38+1w PEC IOL hellp labs from 10a besides Plts downtrending 135 -> 112, creatinine 1.0 -> 0.87   PCR 3.4 but contaminated with blood     Continue buccal cytotec  for early epidural given downtrending plts followed by cervical balloon placement  gary amos, NP

## 2023-06-15 NOTE — OB PROVIDER LABOR PROGRESS NOTE - NS_SUBJECTIVE/OBJECTIVE_OBGYN_ALL_OB_FT
Pt seen for cervical exam. Endorses + intermittent ctx not requesting pain management. Endorses a mild headache 2-3/10 does not want Tylenol      T(C): 36.7 (15 Madhu 2023 09:53), Max: 36.9 (14 Jun 2023 23:40)  T(F): 98.1 (15 Madhu 2023 09:53), Max: 98.42 (14 Jun 2023 23:40)  HR: 85 (15 Madhu 2023 09:53) (75 - 98)  BP: 131/83 (15 Madhu 2023 09:53) (121/81 - 161/88)  RR: 16 (15 Madhu 2023 09:53) (15 - 18)  SpO2: 98% (15 Madhu 2023 09:53) (91% - 98%)  Parameters below as of 15 Madhu 2023 09:53  Patient On (Oxygen Delivery Method): room air    v           11.7   7.51  )-----------( 112      ( 06-15 @ 09:58 )             35.7     06-15 @ 09:58    136  |  106  |  16  ----------------------------<  76  4.0   |  19  |  0.87    Ca    8.0      06-15 @ 09:58    TPro  5.1  /  Alb  2.9  /  TBili  0.2  /  DBili  x   /  AST  26  /  ALT  17  /  AlkPhos  125  06-15 @ 09:58    PT/INR - ( 06-15 @ 09:58 )   PT: 10.5 sec;   INR: 0.91 ratio    PTT - ( 06-15 @ 09:58 )  PTT:29.2 sec    Uric Acid: (06-15 @ 09:58)  9.3      Fibrinogen: (06-15 @ 09:58)  --       LDH: (06-15 @ 09:58)  208 Pt seen for cervical exam. Endorses + intermittent ctx not requesting pain management. Endorses a mild headache 2-3/10 does not want Tylenol. Otherwise asymptomatic.       T(C): 36.7 (15 Madhu 2023 09:53), Max: 36.9 (14 Jun 2023 23:40)  T(F): 98.1 (15 Madhu 2023 09:53), Max: 98.42 (14 Jun 2023 23:40)  HR: 85 (15 Madhu 2023 09:53) (75 - 98)  BP: 131/83 (15 Madhu 2023 09:53) (121/81 - 161/88)  RR: 16 (15 Mdahu 2023 09:53) (15 - 18)  SpO2: 98% (15 Madhu 2023 09:53) (91% - 98%)  Parameters below as of 15 Madhu 2023 09:53  Patient On (Oxygen Delivery Method): room air    v           11.7   7.51  )-----------( 112      ( 06-15 @ 09:58 )             35.7     06-15 @ 09:58    136  |  106  |  16  ----------------------------<  76  4.0   |  19  |  0.87    Ca    8.0      06-15 @ 09:58    TPro  5.1  /  Alb  2.9  /  TBili  0.2  /  DBili  x   /  AST  26  /  ALT  17  /  AlkPhos  125  06-15 @ 09:58    PT/INR - ( 06-15 @ 09:58 )   PT: 10.5 sec;   INR: 0.91 ratio    PTT - ( 06-15 @ 09:58 )  PTT:29.2 sec    Uric Acid: (06-15 @ 09:58)  9.3      Fibrinogen: (06-15 @ 09:58)  --       LDH: (06-15 @ 09:58)  208

## 2023-06-15 NOTE — OB PROVIDER LABOR PROGRESS NOTE - ASSESSMENT
-s/p CB  -chux underneath w/ dark red blood, observe closely for abruption  -tracing currently category 1, overall reassuring  -c/w buccal cytotec due to tone    d/w Dr. Sunshine  [facility] PGY4

## 2023-06-15 NOTE — OB PROVIDER TRIAGE NOTE - HISTORY OF PRESENT ILLNESS
28 yo  @ 38 wks sent from 8pm office visit for elevated BP in office- first time elevated and 24 hr urine results from last week very high. denies vb or lof +GFM, denies pain or contractions. denies fever chills ha n.v new swelling vision changes cp sob or cough. has bilateral pedal edema skin intact no pain + pulses. last saw OB today EFW 8#9 in office VE closed    GBS: negative  meds: Valtrex since 36 wks, last outbreak , diclegis PNV  all: denies  PMH: lamictal prior to pregnancy now biweekly therapy- anxiety  PSH: spinal fusion 2018- anesthesia consult during this pregnancy  gyn hx: abn pap ovarian cysts  ob hx: denies

## 2023-06-15 NOTE — OB PROVIDER TRIAGE NOTE - NSOBPROVIDERNOTE_OBGYN_ALL_OB_FT
26 yo  @ 38 wks sent from 8pm office visit for elevated BP in office- first time elevated and 24 hr urine results from last week very high. denies vb or lof +GFM, denies pain or contractions. denies fever chills ha n.v new swelling vision changes cp sob or cough. has bilateral pedal edema skin intact no pain + pulses. last saw OB today EFW 8#9 in office VE closed    GBS: negative  meds: Valtrex since 36 wks, last outbreak , diclegis PNV  all: denies  PMH: lamictal prior to pregnancy now biweekly therapy- anxiety  PSH: spinal fusion 2018- anesthesia consult during this pregnancy  gyn hx: abn pap ovarian cysts  ob hx: denies    d/w Dr Benitez admit for IOL for PEC @ 38 wks   for buccal cytotec for IOL  for cervical balloon  HELLP labs sent  BP monitoring  prenatals reviewed  repeat VE  pain medication as needed

## 2023-06-15 NOTE — OB PROVIDER LABOR PROGRESS NOTE - ASSESSMENT
cervical balloon placed without incident. instilled with 60/60ccs. Pt tolerated well.     continue buccal cytotec    bette NP

## 2023-06-15 NOTE — OB PROVIDER H&P - ASSESSMENT
28 yo  @ 38 wks sent from 8pm office visit for elevated BP in office- first time elevated and 24 hr urine results from last week very high. denies vb or lof +GFM, denies pain or contractions. denies fever chills ha n.v new swelling vision changes cp sob or cough. has bilateral pedal edema skin intact no pain + pulses. last saw OB today EFW 8#9 in office VE closed    GBS: negative  meds: Valtrex since 36 wks, last outbreak , diclegis PNV  all: denies  PMH: lamictal prior to pregnancy now biweekly therapy- anxiety  PSH: spinal fusion 2018- anesthesia consult during this pregnancy  gyn hx: abn pap ovarian cysts  ob hx: denies   elevated protein in 24 hr urine collection, elevated BP in office    d/w Dr Benitez admit for IOL for PEC @ 38 wks   for buccal cytotec for IOL  for cervical balloon  HELLP labs sent  BP monitoring  prenatals reviewed  repeat VE  pain medication as needed

## 2023-06-16 ENCOUNTER — TRANSCRIPTION ENCOUNTER (OUTPATIENT)
Age: 28
End: 2023-06-16

## 2023-06-16 PROBLEM — Z34.93 ENCOUNTER FOR SUPERVISION OF LOW-RISK PREGNANCY IN THIRD TRIMESTER: Status: ACTIVE | Noted: 2023-04-10 | Resolved: 2023-09-07

## 2023-06-16 LAB
ALBUMIN SERPL ELPH-MCNC: 3 G/DL — LOW (ref 3.3–5)
ALP SERPL-CCNC: 136 U/L — HIGH (ref 40–120)
ALT FLD-CCNC: 19 U/L — SIGNIFICANT CHANGE UP (ref 4–33)
ANION GAP SERPL CALC-SCNC: 11 MMOL/L — SIGNIFICANT CHANGE UP (ref 7–14)
APTT BLD: 29 SEC — SIGNIFICANT CHANGE UP (ref 27–36.3)
APTT BLD: 29.2 SEC — SIGNIFICANT CHANGE UP (ref 27–36.3)
AST SERPL-CCNC: 25 U/L — SIGNIFICANT CHANGE UP (ref 4–32)
BASOPHILS # BLD AUTO: 0.05 K/UL — SIGNIFICANT CHANGE UP (ref 0–0.2)
BASOPHILS # BLD AUTO: 0.05 K/UL — SIGNIFICANT CHANGE UP (ref 0–0.2)
BASOPHILS NFR BLD AUTO: 0.3 % — SIGNIFICANT CHANGE UP (ref 0–2)
BASOPHILS NFR BLD AUTO: 0.6 % — SIGNIFICANT CHANGE UP (ref 0–2)
BILIRUB SERPL-MCNC: 0.3 MG/DL — SIGNIFICANT CHANGE UP (ref 0.2–1.2)
BUN SERPL-MCNC: 16 MG/DL — SIGNIFICANT CHANGE UP (ref 7–23)
CALCIUM SERPL-MCNC: 8 MG/DL — LOW (ref 8.4–10.5)
CHLORIDE SERPL-SCNC: 108 MMOL/L — HIGH (ref 98–107)
CO2 SERPL-SCNC: 18 MMOL/L — LOW (ref 22–31)
CREAT SERPL-MCNC: 0.99 MG/DL — SIGNIFICANT CHANGE UP (ref 0.5–1.3)
EGFR: 81 ML/MIN/1.73M2 — SIGNIFICANT CHANGE UP
EOSINOPHIL # BLD AUTO: 0.03 K/UL — SIGNIFICANT CHANGE UP (ref 0–0.5)
EOSINOPHIL # BLD AUTO: 0.07 K/UL — SIGNIFICANT CHANGE UP (ref 0–0.5)
EOSINOPHIL NFR BLD AUTO: 0.2 % — SIGNIFICANT CHANGE UP (ref 0–6)
EOSINOPHIL NFR BLD AUTO: 0.8 % — SIGNIFICANT CHANGE UP (ref 0–6)
FIBRINOGEN PPP-MCNC: 357 MG/DL — SIGNIFICANT CHANGE UP (ref 200–465)
FIBRINOGEN PPP-MCNC: 413 MG/DL — SIGNIFICANT CHANGE UP (ref 200–465)
GLUCOSE BLDC GLUCOMTR-MCNC: 74 MG/DL — SIGNIFICANT CHANGE UP (ref 70–99)
GLUCOSE BLDC GLUCOMTR-MCNC: 75 MG/DL — SIGNIFICANT CHANGE UP (ref 70–99)
GLUCOSE BLDC GLUCOMTR-MCNC: 75 MG/DL — SIGNIFICANT CHANGE UP (ref 70–99)
GLUCOSE BLDC GLUCOMTR-MCNC: 77 MG/DL — SIGNIFICANT CHANGE UP (ref 70–99)
GLUCOSE BLDC GLUCOMTR-MCNC: 80 MG/DL — SIGNIFICANT CHANGE UP (ref 70–99)
GLUCOSE BLDC GLUCOMTR-MCNC: 81 MG/DL — SIGNIFICANT CHANGE UP (ref 70–99)
GLUCOSE BLDC GLUCOMTR-MCNC: 84 MG/DL — SIGNIFICANT CHANGE UP (ref 70–99)
GLUCOSE SERPL-MCNC: 77 MG/DL — SIGNIFICANT CHANGE UP (ref 70–99)
HCT VFR BLD CALC: 36.9 % — SIGNIFICANT CHANGE UP (ref 34.5–45)
HCT VFR BLD CALC: 39 % — SIGNIFICANT CHANGE UP (ref 34.5–45)
HGB BLD-MCNC: 12.1 G/DL — SIGNIFICANT CHANGE UP (ref 11.5–15.5)
HGB BLD-MCNC: 12.6 G/DL — SIGNIFICANT CHANGE UP (ref 11.5–15.5)
IANC: 12.59 K/UL — HIGH (ref 1.8–7.4)
IANC: 6.35 K/UL — SIGNIFICANT CHANGE UP (ref 1.8–7.4)
IMM GRANULOCYTES NFR BLD AUTO: 0.5 % — SIGNIFICANT CHANGE UP (ref 0–0.9)
IMM GRANULOCYTES NFR BLD AUTO: 0.5 % — SIGNIFICANT CHANGE UP (ref 0–0.9)
INR BLD: 0.88 RATIO — SIGNIFICANT CHANGE UP (ref 0.88–1.16)
INR BLD: 0.9 RATIO — SIGNIFICANT CHANGE UP (ref 0.88–1.16)
LDH SERPL L TO P-CCNC: 201 U/L — SIGNIFICANT CHANGE UP (ref 135–225)
LYMPHOCYTES # BLD AUTO: 1.3 K/UL — SIGNIFICANT CHANGE UP (ref 1–3.3)
LYMPHOCYTES # BLD AUTO: 1.57 K/UL — SIGNIFICANT CHANGE UP (ref 1–3.3)
LYMPHOCYTES # BLD AUTO: 18.4 % — SIGNIFICANT CHANGE UP (ref 13–44)
LYMPHOCYTES # BLD AUTO: 8.8 % — LOW (ref 13–44)
MCHC RBC-ENTMCNC: 30.1 PG — SIGNIFICANT CHANGE UP (ref 27–34)
MCHC RBC-ENTMCNC: 30.1 PG — SIGNIFICANT CHANGE UP (ref 27–34)
MCHC RBC-ENTMCNC: 32.3 GM/DL — SIGNIFICANT CHANGE UP (ref 32–36)
MCHC RBC-ENTMCNC: 32.8 GM/DL — SIGNIFICANT CHANGE UP (ref 32–36)
MCV RBC AUTO: 91.8 FL — SIGNIFICANT CHANGE UP (ref 80–100)
MCV RBC AUTO: 93.3 FL — SIGNIFICANT CHANGE UP (ref 80–100)
MONOCYTES # BLD AUTO: 0.43 K/UL — SIGNIFICANT CHANGE UP (ref 0–0.9)
MONOCYTES # BLD AUTO: 0.65 K/UL — SIGNIFICANT CHANGE UP (ref 0–0.9)
MONOCYTES NFR BLD AUTO: 4.4 % — SIGNIFICANT CHANGE UP (ref 2–14)
MONOCYTES NFR BLD AUTO: 5.1 % — SIGNIFICANT CHANGE UP (ref 2–14)
NEUTROPHILS # BLD AUTO: 12.59 K/UL — HIGH (ref 1.8–7.4)
NEUTROPHILS # BLD AUTO: 6.35 K/UL — SIGNIFICANT CHANGE UP (ref 1.8–7.4)
NEUTROPHILS NFR BLD AUTO: 74.6 % — SIGNIFICANT CHANGE UP (ref 43–77)
NEUTROPHILS NFR BLD AUTO: 85.8 % — HIGH (ref 43–77)
NRBC # BLD: 0 /100 WBCS — SIGNIFICANT CHANGE UP (ref 0–0)
NRBC # BLD: 0 /100 WBCS — SIGNIFICANT CHANGE UP (ref 0–0)
NRBC # FLD: 0 K/UL — SIGNIFICANT CHANGE UP (ref 0–0)
NRBC # FLD: 0 K/UL — SIGNIFICANT CHANGE UP (ref 0–0)
PLATELET # BLD AUTO: 123 K/UL — LOW (ref 150–400)
PLATELET # BLD AUTO: 128 K/UL — LOW (ref 150–400)
POTASSIUM SERPL-MCNC: 4 MMOL/L — SIGNIFICANT CHANGE UP (ref 3.5–5.3)
POTASSIUM SERPL-SCNC: 4 MMOL/L — SIGNIFICANT CHANGE UP (ref 3.5–5.3)
PROT SERPL-MCNC: 5.3 G/DL — LOW (ref 6–8.3)
PROTHROM AB SERPL-ACNC: 10.2 SEC — LOW (ref 10.5–13.4)
PROTHROM AB SERPL-ACNC: 10.4 SEC — LOW (ref 10.5–13.4)
RBC # BLD: 4.02 M/UL — SIGNIFICANT CHANGE UP (ref 3.8–5.2)
RBC # BLD: 4.18 M/UL — SIGNIFICANT CHANGE UP (ref 3.8–5.2)
RBC # FLD: 14 % — SIGNIFICANT CHANGE UP (ref 10.3–14.5)
RBC # FLD: 14.1 % — SIGNIFICANT CHANGE UP (ref 10.3–14.5)
SODIUM SERPL-SCNC: 137 MMOL/L — SIGNIFICANT CHANGE UP (ref 135–145)
URATE SERPL-MCNC: 9 MG/DL — HIGH (ref 2.5–7)
WBC # BLD: 14.7 K/UL — HIGH (ref 3.8–10.5)
WBC # BLD: 8.51 K/UL — SIGNIFICANT CHANGE UP (ref 3.8–10.5)
WBC # FLD AUTO: 14.7 K/UL — HIGH (ref 3.8–10.5)
WBC # FLD AUTO: 8.51 K/UL — SIGNIFICANT CHANGE UP (ref 3.8–10.5)

## 2023-06-16 RX ORDER — OXYTOCIN 10 UNIT/ML
VIAL (ML) INJECTION
Qty: 30 | Refills: 0 | Status: DISCONTINUED | OUTPATIENT
Start: 2023-06-16 | End: 2023-06-17

## 2023-06-16 RX ORDER — ONDANSETRON 8 MG/1
4 TABLET, FILM COATED ORAL ONCE
Refills: 0 | Status: COMPLETED | OUTPATIENT
Start: 2023-06-16 | End: 2023-06-16

## 2023-06-16 RX ADMIN — ONDANSETRON 4 MILLIGRAM(S): 8 TABLET, FILM COATED ORAL at 23:26

## 2023-06-16 RX ADMIN — Medication 2 MILLIUNIT(S)/MIN: at 11:00

## 2023-06-16 NOTE — OB PROVIDER LABOR PROGRESS NOTE - NS_SUBJECTIVE/OBJECTIVE_OBGYN_ALL_OB_FT
Patient re-examined, VE 2.5/50/-3. AROM performed with light meconium, copious fluid. Will get patient a top off and start pitocin. Anticipate .    Chaz Chavez MD

## 2023-06-17 ENCOUNTER — TRANSCRIPTION ENCOUNTER (OUTPATIENT)
Age: 28
End: 2023-06-17

## 2023-06-17 LAB
ALBUMIN SERPL ELPH-MCNC: 2.7 G/DL — LOW (ref 3.3–5)
ALBUMIN SERPL ELPH-MCNC: 3 G/DL — LOW (ref 3.3–5)
ALBUMIN SERPL ELPH-MCNC: 3.3 G/DL — SIGNIFICANT CHANGE UP (ref 3.3–5)
ALP SERPL-CCNC: 138 U/L — HIGH (ref 40–120)
ALP SERPL-CCNC: 149 U/L — HIGH (ref 40–120)
ALP SERPL-CCNC: 160 U/L — HIGH (ref 40–120)
ALT FLD-CCNC: 20 U/L — SIGNIFICANT CHANGE UP (ref 4–33)
ALT FLD-CCNC: 21 U/L — SIGNIFICANT CHANGE UP (ref 4–33)
ALT FLD-CCNC: 23 U/L — SIGNIFICANT CHANGE UP (ref 4–33)
ANION GAP SERPL CALC-SCNC: 11 MMOL/L — SIGNIFICANT CHANGE UP (ref 7–14)
ANION GAP SERPL CALC-SCNC: 13 MMOL/L — SIGNIFICANT CHANGE UP (ref 7–14)
ANION GAP SERPL CALC-SCNC: 13 MMOL/L — SIGNIFICANT CHANGE UP (ref 7–14)
APTT BLD: 28.5 SEC — SIGNIFICANT CHANGE UP (ref 27–36.3)
APTT BLD: 29.1 SEC — SIGNIFICANT CHANGE UP (ref 27–36.3)
AST SERPL-CCNC: 31 U/L — SIGNIFICANT CHANGE UP (ref 4–32)
AST SERPL-CCNC: 32 U/L — SIGNIFICANT CHANGE UP (ref 4–32)
AST SERPL-CCNC: 33 U/L — HIGH (ref 4–32)
BASOPHILS # BLD AUTO: 0.02 K/UL — SIGNIFICANT CHANGE UP (ref 0–0.2)
BASOPHILS # BLD AUTO: 0.05 K/UL — SIGNIFICANT CHANGE UP (ref 0–0.2)
BASOPHILS NFR BLD AUTO: 0.1 % — SIGNIFICANT CHANGE UP (ref 0–2)
BASOPHILS NFR BLD AUTO: 0.3 % — SIGNIFICANT CHANGE UP (ref 0–2)
BILIRUB SERPL-MCNC: 0.2 MG/DL — SIGNIFICANT CHANGE UP (ref 0.2–1.2)
BILIRUB SERPL-MCNC: 0.4 MG/DL — SIGNIFICANT CHANGE UP (ref 0.2–1.2)
BILIRUB SERPL-MCNC: 0.4 MG/DL — SIGNIFICANT CHANGE UP (ref 0.2–1.2)
BUN SERPL-MCNC: 18 MG/DL — SIGNIFICANT CHANGE UP (ref 7–23)
BUN SERPL-MCNC: 19 MG/DL — SIGNIFICANT CHANGE UP (ref 7–23)
BUN SERPL-MCNC: 19 MG/DL — SIGNIFICANT CHANGE UP (ref 7–23)
CALCIUM SERPL-MCNC: 7.7 MG/DL — LOW (ref 8.4–10.5)
CALCIUM SERPL-MCNC: 7.9 MG/DL — LOW (ref 8.4–10.5)
CALCIUM SERPL-MCNC: 8.3 MG/DL — LOW (ref 8.4–10.5)
CHLORIDE SERPL-SCNC: 105 MMOL/L — SIGNIFICANT CHANGE UP (ref 98–107)
CHLORIDE SERPL-SCNC: 106 MMOL/L — SIGNIFICANT CHANGE UP (ref 98–107)
CHLORIDE SERPL-SCNC: 107 MMOL/L — SIGNIFICANT CHANGE UP (ref 98–107)
CO2 SERPL-SCNC: 16 MMOL/L — LOW (ref 22–31)
CO2 SERPL-SCNC: 16 MMOL/L — LOW (ref 22–31)
CO2 SERPL-SCNC: 18 MMOL/L — LOW (ref 22–31)
CREAT SERPL-MCNC: 1.17 MG/DL — SIGNIFICANT CHANGE UP (ref 0.5–1.3)
CREAT SERPL-MCNC: 1.24 MG/DL — SIGNIFICANT CHANGE UP (ref 0.5–1.3)
CREAT SERPL-MCNC: 1.3 MG/DL — SIGNIFICANT CHANGE UP (ref 0.5–1.3)
EGFR: 59 ML/MIN/1.73M2 — LOW
EGFR: 62 ML/MIN/1.73M2 — SIGNIFICANT CHANGE UP
EGFR: 66 ML/MIN/1.73M2 — SIGNIFICANT CHANGE UP
EOSINOPHIL # BLD AUTO: 0 K/UL — SIGNIFICANT CHANGE UP (ref 0–0.5)
EOSINOPHIL # BLD AUTO: 0 K/UL — SIGNIFICANT CHANGE UP (ref 0–0.5)
EOSINOPHIL NFR BLD AUTO: 0 % — SIGNIFICANT CHANGE UP (ref 0–6)
EOSINOPHIL NFR BLD AUTO: 0 % — SIGNIFICANT CHANGE UP (ref 0–6)
FIBRINOGEN PPP-MCNC: 388 MG/DL — SIGNIFICANT CHANGE UP (ref 200–465)
FIBRINOGEN PPP-MCNC: 443 MG/DL — SIGNIFICANT CHANGE UP (ref 200–465)
GLUCOSE BLDC GLUCOMTR-MCNC: 109 MG/DL — HIGH (ref 70–99)
GLUCOSE BLDC GLUCOMTR-MCNC: 95 MG/DL — SIGNIFICANT CHANGE UP (ref 70–99)
GLUCOSE BLDC GLUCOMTR-MCNC: 96 MG/DL — SIGNIFICANT CHANGE UP (ref 70–99)
GLUCOSE SERPL-MCNC: 101 MG/DL — HIGH (ref 70–99)
GLUCOSE SERPL-MCNC: 130 MG/DL — HIGH (ref 70–99)
GLUCOSE SERPL-MCNC: 147 MG/DL — HIGH (ref 70–99)
HCT VFR BLD CALC: 34.5 % — SIGNIFICANT CHANGE UP (ref 34.5–45)
HCT VFR BLD CALC: 38 % — SIGNIFICANT CHANGE UP (ref 34.5–45)
HGB BLD-MCNC: 11.5 G/DL — SIGNIFICANT CHANGE UP (ref 11.5–15.5)
HGB BLD-MCNC: 12.9 G/DL — SIGNIFICANT CHANGE UP (ref 11.5–15.5)
IANC: 15.2 K/UL — HIGH (ref 1.8–7.4)
IANC: 15.52 K/UL — HIGH (ref 1.8–7.4)
IMM GRANULOCYTES NFR BLD AUTO: 0.5 % — SIGNIFICANT CHANGE UP (ref 0–0.9)
IMM GRANULOCYTES NFR BLD AUTO: 0.8 % — SIGNIFICANT CHANGE UP (ref 0–0.9)
INR BLD: <0.9 RATIO — LOW (ref 0.88–1.16)
INR BLD: <0.9 RATIO — SIGNIFICANT CHANGE UP (ref 0.88–1.16)
LDH SERPL L TO P-CCNC: 237 U/L — HIGH (ref 135–225)
LDH SERPL L TO P-CCNC: 244 U/L — HIGH (ref 135–225)
LDH SERPL L TO P-CCNC: 258 U/L — HIGH (ref 135–225)
LYMPHOCYTES # BLD AUTO: 0.58 K/UL — LOW (ref 1–3.3)
LYMPHOCYTES # BLD AUTO: 1.57 K/UL — SIGNIFICANT CHANGE UP (ref 1–3.3)
LYMPHOCYTES # BLD AUTO: 3.6 % — LOW (ref 13–44)
LYMPHOCYTES # BLD AUTO: 8.7 % — LOW (ref 13–44)
MAGNESIUM SERPL-MCNC: 6.9 MG/DL — HIGH (ref 1.6–2.6)
MAGNESIUM SERPL-MCNC: 7.9 MG/DL — CRITICAL HIGH (ref 1.6–2.6)
MAGNESIUM SERPL-MCNC: >9.7 MG/DL — CRITICAL HIGH (ref 1.6–2.6)
MCHC RBC-ENTMCNC: 30.4 PG — SIGNIFICANT CHANGE UP (ref 27–34)
MCHC RBC-ENTMCNC: 30.7 PG — SIGNIFICANT CHANGE UP (ref 27–34)
MCHC RBC-ENTMCNC: 33.3 GM/DL — SIGNIFICANT CHANGE UP (ref 32–36)
MCHC RBC-ENTMCNC: 33.9 GM/DL — SIGNIFICANT CHANGE UP (ref 32–36)
MCV RBC AUTO: 90.5 FL — SIGNIFICANT CHANGE UP (ref 80–100)
MCV RBC AUTO: 91.3 FL — SIGNIFICANT CHANGE UP (ref 80–100)
MONOCYTES # BLD AUTO: 0.36 K/UL — SIGNIFICANT CHANGE UP (ref 0–0.9)
MONOCYTES # BLD AUTO: 0.73 K/UL — SIGNIFICANT CHANGE UP (ref 0–0.9)
MONOCYTES NFR BLD AUTO: 2.2 % — SIGNIFICANT CHANGE UP (ref 2–14)
MONOCYTES NFR BLD AUTO: 4.1 % — SIGNIFICANT CHANGE UP (ref 2–14)
NEUTROPHILS # BLD AUTO: 15.2 K/UL — HIGH (ref 1.8–7.4)
NEUTROPHILS # BLD AUTO: 15.52 K/UL — HIGH (ref 1.8–7.4)
NEUTROPHILS NFR BLD AUTO: 86.1 % — HIGH (ref 43–77)
NEUTROPHILS NFR BLD AUTO: 93.6 % — HIGH (ref 43–77)
NRBC # BLD: 0 /100 WBCS — SIGNIFICANT CHANGE UP (ref 0–0)
NRBC # BLD: 0 /100 WBCS — SIGNIFICANT CHANGE UP (ref 0–0)
NRBC # FLD: 0 K/UL — SIGNIFICANT CHANGE UP (ref 0–0)
NRBC # FLD: 0 K/UL — SIGNIFICANT CHANGE UP (ref 0–0)
PLATELET # BLD AUTO: 121 K/UL — LOW (ref 150–400)
PLATELET # BLD AUTO: 148 K/UL — LOW (ref 150–400)
POTASSIUM SERPL-MCNC: 3.8 MMOL/L — SIGNIFICANT CHANGE UP (ref 3.5–5.3)
POTASSIUM SERPL-MCNC: 4.2 MMOL/L — SIGNIFICANT CHANGE UP (ref 3.5–5.3)
POTASSIUM SERPL-MCNC: 4.4 MMOL/L — SIGNIFICANT CHANGE UP (ref 3.5–5.3)
POTASSIUM SERPL-SCNC: 3.8 MMOL/L — SIGNIFICANT CHANGE UP (ref 3.5–5.3)
POTASSIUM SERPL-SCNC: 4.2 MMOL/L — SIGNIFICANT CHANGE UP (ref 3.5–5.3)
POTASSIUM SERPL-SCNC: 4.4 MMOL/L — SIGNIFICANT CHANGE UP (ref 3.5–5.3)
PROT SERPL-MCNC: 5 G/DL — LOW (ref 6–8.3)
PROT SERPL-MCNC: 5.7 G/DL — LOW (ref 6–8.3)
PROT SERPL-MCNC: 5.7 G/DL — LOW (ref 6–8.3)
PROTHROM AB SERPL-ACNC: 10.2 SEC — LOW (ref 10.5–13.4)
PROTHROM AB SERPL-ACNC: 9.9 SEC — LOW (ref 10.5–13.4)
RBC # BLD: 3.78 M/UL — LOW (ref 3.8–5.2)
RBC # BLD: 4.2 M/UL — SIGNIFICANT CHANGE UP (ref 3.8–5.2)
RBC # FLD: 13.8 % — SIGNIFICANT CHANGE UP (ref 10.3–14.5)
RBC # FLD: 13.8 % — SIGNIFICANT CHANGE UP (ref 10.3–14.5)
SODIUM SERPL-SCNC: 134 MMOL/L — LOW (ref 135–145)
SODIUM SERPL-SCNC: 135 MMOL/L — SIGNIFICANT CHANGE UP (ref 135–145)
SODIUM SERPL-SCNC: 136 MMOL/L — SIGNIFICANT CHANGE UP (ref 135–145)
T PALLIDUM AB TITR SER: NEGATIVE — SIGNIFICANT CHANGE UP
URATE SERPL-MCNC: 9.3 MG/DL — HIGH (ref 2.5–7)
URATE SERPL-MCNC: 9.6 MG/DL — HIGH (ref 2.5–7)
URATE SERPL-MCNC: 9.9 MG/DL — HIGH (ref 2.5–7)
WBC # BLD: 16.24 K/UL — HIGH (ref 3.8–10.5)
WBC # BLD: 18.01 K/UL — HIGH (ref 3.8–10.5)
WBC # FLD AUTO: 16.24 K/UL — HIGH (ref 3.8–10.5)
WBC # FLD AUTO: 18.01 K/UL — HIGH (ref 3.8–10.5)

## 2023-06-17 PROCEDURE — 59510 CESAREAN DELIVERY: CPT | Mod: U9,GC

## 2023-06-17 RX ORDER — LANOLIN
1 OINTMENT (GRAM) TOPICAL EVERY 6 HOURS
Refills: 0 | Status: DISCONTINUED | OUTPATIENT
Start: 2023-06-17 | End: 2023-06-21

## 2023-06-17 RX ORDER — IBUPROFEN 200 MG
600 TABLET ORAL EVERY 6 HOURS
Refills: 0 | Status: COMPLETED | OUTPATIENT
Start: 2023-06-17 | End: 2024-05-15

## 2023-06-17 RX ORDER — BUTORPHANOL TARTRATE 2 MG/ML
2 INJECTION, SOLUTION INTRAMUSCULAR; INTRAVENOUS ONCE
Refills: 0 | Status: DISCONTINUED | OUTPATIENT
Start: 2023-06-17 | End: 2023-06-17

## 2023-06-17 RX ORDER — SIMETHICONE 80 MG/1
80 TABLET, CHEWABLE ORAL EVERY 4 HOURS
Refills: 0 | Status: DISCONTINUED | OUTPATIENT
Start: 2023-06-17 | End: 2023-06-21

## 2023-06-17 RX ORDER — SODIUM CHLORIDE 9 MG/ML
1000 INJECTION, SOLUTION INTRAVENOUS
Refills: 0 | Status: DISCONTINUED | OUTPATIENT
Start: 2023-06-17 | End: 2023-06-18

## 2023-06-17 RX ORDER — VALACYCLOVIR 500 MG/1
1 TABLET, FILM COATED ORAL
Refills: 0 | DISCHARGE

## 2023-06-17 RX ORDER — METOCLOPRAMIDE HCL 10 MG
10 TABLET ORAL ONCE
Refills: 0 | Status: COMPLETED | OUTPATIENT
Start: 2023-06-17 | End: 2023-06-17

## 2023-06-17 RX ORDER — ACETAMINOPHEN 500 MG
1000 TABLET ORAL ONCE
Refills: 0 | Status: DISCONTINUED | OUTPATIENT
Start: 2023-06-17 | End: 2023-06-19

## 2023-06-17 RX ORDER — NALOXONE HYDROCHLORIDE 4 MG/.1ML
0.1 SPRAY NASAL
Refills: 0 | Status: DISCONTINUED | OUTPATIENT
Start: 2023-06-17 | End: 2023-06-19

## 2023-06-17 RX ORDER — NALBUPHINE HYDROCHLORIDE 10 MG/ML
2.5 INJECTION, SOLUTION INTRAMUSCULAR; INTRAVENOUS; SUBCUTANEOUS EVERY 6 HOURS
Refills: 0 | Status: DISCONTINUED | OUTPATIENT
Start: 2023-06-17 | End: 2023-06-19

## 2023-06-17 RX ORDER — MAGNESIUM SULFATE 500 MG/ML
2 VIAL (ML) INJECTION
Qty: 40 | Refills: 0 | Status: DISCONTINUED | OUTPATIENT
Start: 2023-06-17 | End: 2023-06-17

## 2023-06-17 RX ORDER — OXYCODONE HYDROCHLORIDE 5 MG/1
5 TABLET ORAL
Refills: 0 | Status: DISCONTINUED | OUTPATIENT
Start: 2023-06-17 | End: 2023-06-19

## 2023-06-17 RX ORDER — TETANUS TOXOID, REDUCED DIPHTHERIA TOXOID AND ACELLULAR PERTUSSIS VACCINE, ADSORBED 5; 2.5; 8; 8; 2.5 [IU]/.5ML; [IU]/.5ML; UG/.5ML; UG/.5ML; UG/.5ML
0.5 SUSPENSION INTRAMUSCULAR ONCE
Refills: 0 | Status: DISCONTINUED | OUTPATIENT
Start: 2023-06-17 | End: 2023-06-21

## 2023-06-17 RX ORDER — MAGNESIUM HYDROXIDE 400 MG/1
30 TABLET, CHEWABLE ORAL
Refills: 0 | Status: DISCONTINUED | OUTPATIENT
Start: 2023-06-17 | End: 2023-06-21

## 2023-06-17 RX ORDER — DOXYLAMINE SUCCINATE AND PYRIDOXINE HYDROCHLORIDE, DELAYED RELEASE TABLETS 10 MG/10 MG 10; 10 MG/1; MG/1
0 TABLET, DELAYED RELEASE ORAL
Qty: 0 | Refills: 0 | DISCHARGE

## 2023-06-17 RX ORDER — FAMOTIDINE 10 MG/ML
20 INJECTION INTRAVENOUS ONCE
Refills: 0 | Status: DISCONTINUED | OUTPATIENT
Start: 2023-06-17 | End: 2023-06-21

## 2023-06-17 RX ORDER — ACETAMINOPHEN 500 MG
975 TABLET ORAL
Refills: 0 | Status: DISCONTINUED | OUTPATIENT
Start: 2023-06-17 | End: 2023-06-21

## 2023-06-17 RX ORDER — HEPARIN SODIUM 5000 [USP'U]/ML
5000 INJECTION INTRAVENOUS; SUBCUTANEOUS EVERY 8 HOURS
Refills: 0 | Status: DISCONTINUED | OUTPATIENT
Start: 2023-06-17 | End: 2023-06-18

## 2023-06-17 RX ORDER — OXYTOCIN 10 UNIT/ML
333.33 VIAL (ML) INJECTION
Qty: 20 | Refills: 0 | Status: DISCONTINUED | OUTPATIENT
Start: 2023-06-17 | End: 2023-06-19

## 2023-06-17 RX ORDER — MAGNESIUM SULFATE 500 MG/ML
4 VIAL (ML) INJECTION ONCE
Refills: 0 | Status: COMPLETED | OUTPATIENT
Start: 2023-06-17 | End: 2023-06-17

## 2023-06-17 RX ORDER — OXYCODONE HYDROCHLORIDE 5 MG/1
5 TABLET ORAL ONCE
Refills: 0 | Status: DISCONTINUED | OUTPATIENT
Start: 2023-06-17 | End: 2023-06-21

## 2023-06-17 RX ORDER — DIPHENHYDRAMINE HCL 50 MG
25 CAPSULE ORAL EVERY 6 HOURS
Refills: 0 | Status: DISCONTINUED | OUTPATIENT
Start: 2023-06-17 | End: 2023-06-21

## 2023-06-17 RX ORDER — LABETALOL HCL 100 MG
20 TABLET ORAL ONCE
Refills: 0 | Status: COMPLETED | OUTPATIENT
Start: 2023-06-17 | End: 2023-06-17

## 2023-06-17 RX ORDER — KETOROLAC TROMETHAMINE 30 MG/ML
30 SYRINGE (ML) INJECTION EVERY 6 HOURS
Refills: 0 | Status: COMPLETED | OUTPATIENT
Start: 2023-06-17 | End: 2023-06-19

## 2023-06-17 RX ORDER — MAGNESIUM SULFATE 500 MG/ML
2 VIAL (ML) INJECTION
Qty: 40 | Refills: 0 | Status: DISCONTINUED | OUTPATIENT
Start: 2023-06-17 | End: 2023-06-18

## 2023-06-17 RX ORDER — MORPHINE SULFATE 50 MG/1
0.1 CAPSULE, EXTENDED RELEASE ORAL ONCE
Refills: 0 | Status: DISCONTINUED | OUTPATIENT
Start: 2023-06-17 | End: 2023-06-19

## 2023-06-17 RX ORDER — OXYCODONE HYDROCHLORIDE 5 MG/1
5 TABLET ORAL
Refills: 0 | Status: COMPLETED | OUTPATIENT
Start: 2023-06-17 | End: 2023-06-24

## 2023-06-17 RX ORDER — ONDANSETRON 8 MG/1
4 TABLET, FILM COATED ORAL EVERY 6 HOURS
Refills: 0 | Status: DISCONTINUED | OUTPATIENT
Start: 2023-06-17 | End: 2023-06-19

## 2023-06-17 RX ORDER — OXYCODONE HYDROCHLORIDE 5 MG/1
10 TABLET ORAL
Refills: 0 | Status: DISCONTINUED | OUTPATIENT
Start: 2023-06-17 | End: 2023-06-19

## 2023-06-17 RX ADMIN — Medication 20 MILLIGRAM(S): at 10:25

## 2023-06-17 RX ADMIN — Medication 300 GRAM(S): at 02:08

## 2023-06-17 RX ADMIN — Medication 1000 MILLIUNIT(S)/MIN: at 15:00

## 2023-06-17 RX ADMIN — BUTORPHANOL TARTRATE 2 MILLIGRAM(S): 2 INJECTION, SOLUTION INTRAMUSCULAR; INTRAVENOUS at 00:28

## 2023-06-17 RX ADMIN — Medication 50 GM/HR: at 20:22

## 2023-06-17 RX ADMIN — Medication 50 GM/HR: at 17:47

## 2023-06-17 RX ADMIN — Medication 30 MILLIGRAM(S): at 22:38

## 2023-06-17 RX ADMIN — Medication 10 MILLIGRAM(S): at 22:37

## 2023-06-17 RX ADMIN — Medication 50 GM/HR: at 02:31

## 2023-06-17 RX ADMIN — Medication 50 GM/HR: at 07:19

## 2023-06-17 RX ADMIN — BUTORPHANOL TARTRATE 2 MILLIGRAM(S): 2 INJECTION, SOLUTION INTRAMUSCULAR; INTRAVENOUS at 02:17

## 2023-06-17 RX ADMIN — CHLORHEXIDINE GLUCONATE 1 APPLICATION(S): 213 SOLUTION TOPICAL at 02:18

## 2023-06-17 RX ADMIN — Medication 30 MILLIGRAM(S): at 22:50

## 2023-06-17 RX ADMIN — HEPARIN SODIUM 5000 UNIT(S): 5000 INJECTION INTRAVENOUS; SUBCUTANEOUS at 22:38

## 2023-06-17 NOTE — OB PROVIDER LABOR PROGRESS NOTE - NS_SUBJECTIVE/OBJECTIVE_OBGYN_ALL_OB_FT
R1 Labor Note    S: Patient evaluated at bedside for cervical change. Reports more pain.    O:  T(C): 36.8 (06-17-23 @ 03:55), Max: 36.9 (06-16-23 @ 10:00)  HR: 107 (06-17-23 @ 06:18) (76 - 117)  BP: 134/77 (06-17-23 @ 06:14) (74/43 - 168/96)  RR: 16 (06-17-23 @ 03:55) (14 - 18)  SpO2: 95% (06-17-23 @ 06:18) (80% - 100%)

## 2023-06-17 NOTE — OB PROVIDER LABOR PROGRESS NOTE - ASSESSMENT
A/P 27y  @38w3d IOL for sPEC, A1  -IOL: s/p AROM@845a, c/w Pitocin  -Cat 1 tracing  -GBS neg  -EFW 3884  -sPEC - no recent SRBPs, denies sxs of PEC, HELLP labs with low Plt 112->>123, P/C 3.4, continue to monitor  -A1 - c/w rotating fluids, FS per protocol  -Analgesia epi, for top off  -Anticipate     d/w Dr. Ravinder Allison, PGY-1

## 2023-06-17 NOTE — OB PROVIDER LABOR PROGRESS NOTE - NS_OBIHIFHRDETAILS_OBGYN_ALL_OB_FT
130/mod rosangela/+ accels/no decels
110/mod/+accels/-decels
120/mod/+accels/-decels
baseline 125, mod rosangela, +accels, no decels
130/mod rosangela/+ accels/no decels

## 2023-06-17 NOTE — DISCHARGE NOTE OB - PATIENT PORTAL LINK FT
You can access the FollowMyHealth Patient Portal offered by [facility] by registering at the following website: [email or web address]. By joining [facility]’s FollowMyHealth portal, you will also be able to view your health information using other applications (apps) compatible with our system.

## 2023-06-17 NOTE — OB RN DELIVERY SUMMARY - AMNIOTIC FLUID AMOUNT, LABOR
within normal limits
Verbalized Understanding/Simple: Patient demonstrates quick and easy understanding

## 2023-06-17 NOTE — DISCHARGE NOTE OB - CARE PLAN
Principal Discharge DX:	Status post primary low transverse  section  Assessment and plan of treatment:	After discharge, please stay on pelvic rest for 6 weeks, meaning no sexual intercourse, no tampons and no douching.  No driving for 2 weeks.  No lifting objects heavier than baby for 2 weeks.  Expect to have vaginal bleeding/spotting for up to six weeks.  The bleeding should get lighter and more white/light brown with time.  For bleeding soaking more than a pad an hour or passing clots greater than the size of your fist, come in to the emergency department.  Follow up in the office in 6 weeks.   1 Principal Discharge DX:	Status post primary low transverse  section  Assessment and plan of treatment:	After discharge, please stay on pelvic rest for 6 weeks, meaning no sexual intercourse, no tampons and no douching.  No driving for 2 weeks.  No lifting objects heavier than baby for 2 weeks.  Expect to have vaginal bleeding/spotting for up to six weeks.  The bleeding should get lighter and more white/light brown with time.  For bleeding soaking more than a pad an hour or passing clots greater than the size of your fist, come in to the emergency department.  Follow up in the office in 6 weeks.  Secondary Diagnosis:	Severe preeclampsia  Assessment and plan of treatment:	 your blood pressure monitor from [email or web address] on 1st floor of [facility]. Follow-up in your OB office within 1 week for a blood pressure check.   Please take your blood pressure 3x/day. Call your doctor if your blood pressure is 140 (top number) OR 90 (bottom number) or higher. Return to hospital if your blood pressure is 160 (top number)  (bottom number) or higher. Call your doctor if you experience symptoms such as headache, blurry vision, epigastric pain, or nausea/vomiting.

## 2023-06-17 NOTE — PROGRESS NOTE ADULT - SUBJECTIVE AND OBJECTIVE BOX
Called to discuss anesthesia options for  with patient. Patient has a history of L5-S1 fusion. She says that she has not had optimal relief from her labour epidural. We discussed the options of spinal anesthesia, and general anesthesia and their benefits and risks.  Patient would like to proceed with spinal anesthesia. General anesthesia as a back up measure.

## 2023-06-17 NOTE — DISCHARGE NOTE OB - HOSPITAL COURSE
Patient was admitted for induction of labor for preeclampsia, met criteria for preeclampsia with severe features. After suboptimal pain management, pt desired elective  section, uncomplicated.

## 2023-06-17 NOTE — OB PROVIDER DELIVERY SUMMARY - NSPROVIDERDELIVERYNOTE_OBGYN_ALL_OB_FT
unscheduled pLTCS    Viable male infant   APGARS 8/9   3620g  Hysterotomy closed in single layer using caprosyn  Grossly normal uterus, tubes, and ovaries  Abdomen closed in standard fashion  Pt to recovery in stable condition    EBL:769  IVF: 2000  UOP: 100    Dication # [id number]  Mary Handy, PGY-2 unscheduled elective pLTCS    Viable male infant   APGARS 8/9   3620g  Hysterotomy closed in single layer using caprosyn  Grossly normal uterus, tubes, and ovaries  Abdomen closed in standard fashion  Pt to recovery in stable condition    EBL:769  IVF: 2000  UOP: 100    Dication # [id number]  Mary Handy, PGY-2

## 2023-06-17 NOTE — DISCHARGE NOTE OB - MEDICATION SUMMARY - MEDICATIONS TO STOP TAKING
I will STOP taking the medications listed below when I get home from the hospital:    metoclopramide 10 mg oral tablet, disintegrating  -- 1 tab(s) by mouth every 8 hours, As Needed -for nausea/vomiting  -- Check with your doctor before becoming pregnant.  Do not drink alcoholic beverages when taking this medication.  May cause drowsiness or dizziness.  Obtain medical advice before taking any non-prescription drugs as some may affect the action of this medication.  Take medication on an empty stomach 1 hour before or 2 to 3 hours after a meal unless otherwise directed by your doctor.  This drug may impair the ability to drive or operate machinery.  Use care until you become familiar with its effects.   I will STOP taking the medications listed below when I get home from the hospital:    metoclopramide 5 mg/5 mL oral syrup  -- 10 milliliter(s) by mouth every 8 hours, As Needed for nausea/vomiting  -- It is very important that you take or use this exactly as directed.  Do not skip doses or discontinue unless directed by your doctor.  May cause drowsiness.  Alcohol may intensify this effect.  Use care when operating dangerous machinery.  Take medication on an empty stomach 1 hour before or 2 to 3 hours after a meal unless otherwise directed by your doctor.    Diclegis    metoclopramide 10 mg oral tablet, disintegrating  -- 1 tab(s) by mouth every 8 hours, As Needed -for nausea/vomiting  -- Check with your doctor before becoming pregnant.  Do not drink alcoholic beverages when taking this medication.  May cause drowsiness or dizziness.  Obtain medical advice before taking any non-prescription drugs as some may affect the action of this medication.  Take medication on an empty stomach 1 hour before or 2 to 3 hours after a meal unless otherwise directed by your doctor.  This drug may impair the ability to drive or operate machinery.  Use care until you become familiar with its effects.    ondansetron 4 mg oral disintegrating strip  -- 1 each by mouth 3 times a day   -- Check with your doctor before becoming pregnant.  Do not chew, break, or crush.  Obtain medical advice before taking any non-prescription drugs as some may affect the action of this medication.    hydrOXYzine hydrochloride 25 mg oral tablet  -- 1 tab(s) by mouth every 6 hours   -- May cause drowsiness.  Alcohol may intensify this effect.  Use care when operating dangerous machinery.  Obtain medical advice before taking any non-prescription drugs as some may affect the action of this medication.    Junel 1.5/30 oral tablet  -- 1 tab(s) by mouth once a day  Stopped end of October for upcoming surgery,    valACYclovir 500 mg oral tablet  -- 1 tab(s) by mouth once a day    Diclegis 10 mg-10 mg oral delayed release tablet  -- 2 tab(s) by mouth once a day (at bedtime)

## 2023-06-17 NOTE — DISCHARGE NOTE OB - MEDICATION SUMMARY - MEDICATIONS TO TAKE
I will START or STAY ON the medications listed below when I get home from the hospital:    ibuprofen 200 mg oral tablet  -- 3 tab(s) by mouth every 6 hours  -- Indication: For Pain    acetaminophen 325 mg oral tablet  -- 3 tab(s) by mouth every 6 hours  -- Indication: For Pain    Prenatal 1  -- Indication: For Prenatal   I will START or STAY ON the medications listed below when I get home from the hospital:    Blood Pressure monitor  -- Electronic Blood Pressure X 1  Please check blood pressures three times a day.  Notify MD for blood pressures greater than 140/90. Return to hospital for blood pressures greater than 160/110.  -- Indication: For preeclampsia with severe features    ibuprofen 200 mg oral tablet  -- 3 tab(s) by mouth every 6 hours as needed for  moderate pain  -- Indication: For pain    acetaminophen 325 mg oral tablet  -- 3 tab(s) by mouth every 6 hours as needed for  mild pain  -- Indication: For Pain    NIFEdipine 30 mg oral tablet, extended release  -- 1 tab(s) by mouth once a day Do not take for blood pressures less than 110/60  -- Indication: For preeclampsia with severe features    Prenatal 1  -- Indication: For Prenatal

## 2023-06-17 NOTE — PACU DISCHARGE NOTE - COMMENTS
Found wandering around hospital, admits to drinking for a month, depressed not suicidal or homicidal No anesthesia complications

## 2023-06-17 NOTE — OB PROVIDER LABOR PROGRESS NOTE - ASSESSMENT
Discussed plan of care with pt and partner at bedside  Pt 4cm for > 12 hours.  Pt with suboptimal pain control, epidural placed x3, hx of spinal fusion. Pt has been getting top offs every 1-2 hours with minimal relief.  Pitocin on 22 mu  Pt hesitant to continue with IOL given suboptimal patient control, discussed pLTCS, likely under GETA  Pt and partner to consider    iain JEFFERSON

## 2023-06-17 NOTE — OB NEONATOLOGY/PEDIATRICIAN DELIVERY SUMMARY - NSPEDSNEONOTESA_OBGYN_ALL_OB_FT
Peds called to delivery for cat 2, magnesium. 38.3 wk male born via unscheduled CS to a 26 y/o  blood type A+ mother. IOL for PreE, changed to c/s for failure to progress. 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 Prenatal history of preeclampsia, GDMA2. PNL -/-/NR/I, GBS - on . AROM at 08:43 on  with light meconium fluids, approx. 29 hrs. Baby emerged vigorous, crying, mildly dec tone, was w/d/s/s with APGARS of 7/8. Plans to initiate breastfeeding, consents Hep B vaccine and consents circ. EOS 0.20. Peds called to delivery for cat 2, magnesium. 38.3 wk male born via unscheduled CS to a 26 y/o  blood type A+ mother. IOL for PreE, changed to c/s for failure to progress. 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 Prenatal history of preeclampsia, GDMA2. PNL -/-/NR/I, GBS - on . AROM at 08:43 on  with light meconium fluids, approx. 29 hrs. Baby emerged vigorous, crying, mildly dec tone, was w/d/s/s with APGARS of 8/9. Plans to initiate breastfeeding, consents Hep B vaccine and consents circ. EOS 0.20.

## 2023-06-17 NOTE — OB RN DELIVERY SUMMARY - NSSELHIDDEN_OBGYN_ALL_OB_FT
[NS_DeliveryAttending1_OBGYN_ALL_OB_FT:[id number]],[NS_DeliveryAssist1_OBGYN_ALL_OB_FT:[id number]],[NS_DeliveryRN_OBGYN_ALL_OB_FT:[id number]=]

## 2023-06-17 NOTE — DISCHARGE NOTE OB - MATERIALS PROVIDED
Vaccinations/[facility]  Screening Program/  Immunization Record/Breastfeeding Log/Bottle Feeding Log/Breastfeeding Mother’s Support Group Information/Guide to Postpartum Care/[facility] Hearing Screen Program/Back To Sleep Handout/Shaken Baby Prevention Handout/Breastfeeding Guide and Packet/Birth Certificate Instructions

## 2023-06-17 NOTE — PACU DISCHARGE NOTE - NAUSEA/VOMITING:
Immediate Postoperative / Post-Procedure Note    Preoperative Diagnosis:_     rectal carcinoid    Postoperative Diagnosis:_   smooth scar at resection site in rectum, normal rectal wall, no concerning lymphadenopathy    Procedure(s):_   flex sig w/ bx and apc ablation, rectal eus    Surgeon/Proceduralist:_ felisha    Assistant:_  mena quintero    Anesthesia Type:_    mac    Estimated Blood Loss:_ none    Transfusion Summary:_  none    Specimen(s):_  yes    Grafts/Implants:_   n/a    Complications:_  none          
Controlled

## 2023-06-17 NOTE — DISCHARGE NOTE OB - PLAN OF CARE
After discharge, please stay on pelvic rest for 6 weeks, meaning no sexual intercourse, no tampons and no douching.  No driving for 2 weeks.  No lifting objects heavier than baby for 2 weeks.  Expect to have vaginal bleeding/spotting for up to six weeks.  The bleeding should get lighter and more white/light brown with time.  For bleeding soaking more than a pad an hour or passing clots greater than the size of your fist, come in to the emergency department.  Follow up in the office in 6 weeks.  your blood pressure monitor from [facility] on 1st floor of [facility]. Follow-up in your OB office within 1 week for a blood pressure check.   Please take your blood pressure 3x/day. Call your doctor if your blood pressure is 140 (top number) OR 90 (bottom number) or higher. Return to hospital if your blood pressure is 160 (top number)  (bottom number) or higher. Call your doctor if you experience symptoms such as headache, blurry vision, epigastric pain, or nausea/vomiting.

## 2023-06-17 NOTE — DISCHARGE NOTE OB - CARE PROVIDER_API CALL
Lulú Yun  Obstetrics and Gynecology  [address] [place] [address]  Phone: [phone]  Fax: [phone]  Follow Up Time:

## 2023-06-17 NOTE — OB RN INTRAOPERATIVE NOTE - NSSELHIDDEN_OBGYN_ALL_OB_FT
[NS_DeliveryAttending1_OBGYN_ALL_OB_FT:[id number]=],[NS_DeliveryAssist1_OBGYN_ALL_OB_FT:[id number]=],[NS_DeliveryRN_OBGYN_ALL_OB_FT:[id number]=]

## 2023-06-17 NOTE — OB PROVIDER DELIVERY SUMMARY - NSSELHIDDEN_OBGYN_ALL_OB_FT
[NS_DeliveryAttending1_OBGYN_ALL_OB_FT:[id number]],[NS_DeliveryAssist1_OBGYN_ALL_OB_FT:[id number]=]

## 2023-06-18 LAB
ALBUMIN SERPL ELPH-MCNC: 2.7 G/DL — LOW (ref 3.3–5)
ALBUMIN SERPL ELPH-MCNC: 2.7 G/DL — LOW (ref 3.3–5)
ALP SERPL-CCNC: 124 U/L — HIGH (ref 40–120)
ALP SERPL-CCNC: 138 U/L — HIGH (ref 40–120)
ALT FLD-CCNC: 17 U/L — SIGNIFICANT CHANGE UP (ref 4–33)
ALT FLD-CCNC: 21 U/L — SIGNIFICANT CHANGE UP (ref 4–33)
ANION GAP SERPL CALC-SCNC: 11 MMOL/L — SIGNIFICANT CHANGE UP (ref 7–14)
ANION GAP SERPL CALC-SCNC: 14 MMOL/L — SIGNIFICANT CHANGE UP (ref 7–14)
APTT BLD: 27.6 SEC — SIGNIFICANT CHANGE UP (ref 27–36.3)
APTT BLD: 29.4 SEC — SIGNIFICANT CHANGE UP (ref 27–36.3)
AST SERPL-CCNC: 30 U/L — SIGNIFICANT CHANGE UP (ref 4–32)
AST SERPL-CCNC: 31 U/L — SIGNIFICANT CHANGE UP (ref 4–32)
BASOPHILS # BLD AUTO: 0.05 K/UL — SIGNIFICANT CHANGE UP (ref 0–0.2)
BASOPHILS # BLD AUTO: 0.05 K/UL — SIGNIFICANT CHANGE UP (ref 0–0.2)
BASOPHILS NFR BLD AUTO: 0.2 % — SIGNIFICANT CHANGE UP (ref 0–2)
BASOPHILS NFR BLD AUTO: 0.3 % — SIGNIFICANT CHANGE UP (ref 0–2)
BILIRUB SERPL-MCNC: 0.2 MG/DL — SIGNIFICANT CHANGE UP (ref 0.2–1.2)
BILIRUB SERPL-MCNC: <0.2 MG/DL — SIGNIFICANT CHANGE UP (ref 0.2–1.2)
BUN SERPL-MCNC: 19 MG/DL — SIGNIFICANT CHANGE UP (ref 7–23)
BUN SERPL-MCNC: 22 MG/DL — SIGNIFICANT CHANGE UP (ref 7–23)
CALCIUM SERPL-MCNC: 7.5 MG/DL — LOW (ref 8.4–10.5)
CALCIUM SERPL-MCNC: 7.9 MG/DL — LOW (ref 8.4–10.5)
CHLORIDE SERPL-SCNC: 100 MMOL/L — SIGNIFICANT CHANGE UP (ref 98–107)
CHLORIDE SERPL-SCNC: 100 MMOL/L — SIGNIFICANT CHANGE UP (ref 98–107)
CO2 SERPL-SCNC: 16 MMOL/L — LOW (ref 22–31)
CO2 SERPL-SCNC: 20 MMOL/L — LOW (ref 22–31)
CREAT SERPL-MCNC: 1.25 MG/DL — SIGNIFICANT CHANGE UP (ref 0.5–1.3)
CREAT SERPL-MCNC: 1.3 MG/DL — SIGNIFICANT CHANGE UP (ref 0.5–1.3)
EGFR: 59 ML/MIN/1.73M2 — LOW
EGFR: 61 ML/MIN/1.73M2 — SIGNIFICANT CHANGE UP
EOSINOPHIL # BLD AUTO: 0 K/UL — SIGNIFICANT CHANGE UP (ref 0–0.5)
EOSINOPHIL # BLD AUTO: 0.12 K/UL — SIGNIFICANT CHANGE UP (ref 0–0.5)
EOSINOPHIL NFR BLD AUTO: 0 % — SIGNIFICANT CHANGE UP (ref 0–6)
EOSINOPHIL NFR BLD AUTO: 0.7 % — SIGNIFICANT CHANGE UP (ref 0–6)
GLUCOSE SERPL-MCNC: 103 MG/DL — HIGH (ref 70–99)
GLUCOSE SERPL-MCNC: 96 MG/DL — SIGNIFICANT CHANGE UP (ref 70–99)
HCT VFR BLD CALC: 30.6 % — LOW (ref 34.5–45)
HCT VFR BLD CALC: 34.3 % — LOW (ref 34.5–45)
HGB BLD-MCNC: 10.2 G/DL — LOW (ref 11.5–15.5)
HGB BLD-MCNC: 11.5 G/DL — SIGNIFICANT CHANGE UP (ref 11.5–15.5)
IANC: 14.01 K/UL — HIGH (ref 1.8–7.4)
IANC: 19.47 K/UL — HIGH (ref 1.8–7.4)
IMM GRANULOCYTES NFR BLD AUTO: 0.7 % — SIGNIFICANT CHANGE UP (ref 0–0.9)
IMM GRANULOCYTES NFR BLD AUTO: 0.7 % — SIGNIFICANT CHANGE UP (ref 0–0.9)
INR BLD: <0.9 RATIO — LOW (ref 0.88–1.16)
INR BLD: <0.9 RATIO — SIGNIFICANT CHANGE UP (ref 0.88–1.16)
LDH SERPL L TO P-CCNC: 249 U/L — HIGH (ref 135–225)
LDH SERPL L TO P-CCNC: 289 U/L — HIGH (ref 135–225)
LYMPHOCYTES # BLD AUTO: 1.56 K/UL — SIGNIFICANT CHANGE UP (ref 1–3.3)
LYMPHOCYTES # BLD AUTO: 12 % — LOW (ref 13–44)
LYMPHOCYTES # BLD AUTO: 2.04 K/UL — SIGNIFICANT CHANGE UP (ref 1–3.3)
LYMPHOCYTES # BLD AUTO: 7.1 % — LOW (ref 13–44)
MAGNESIUM SERPL-MCNC: 8.5 MG/DL — CRITICAL HIGH (ref 1.6–2.6)
MAGNESIUM SERPL-MCNC: 9.2 MG/DL — CRITICAL HIGH (ref 1.6–2.6)
MAGNESIUM SERPL-MCNC: >9.7 MG/DL — SIGNIFICANT CHANGE UP (ref 1.6–2.6)
MCHC RBC-ENTMCNC: 30.2 PG — SIGNIFICANT CHANGE UP (ref 27–34)
MCHC RBC-ENTMCNC: 30.3 PG — SIGNIFICANT CHANGE UP (ref 27–34)
MCHC RBC-ENTMCNC: 33.3 GM/DL — SIGNIFICANT CHANGE UP (ref 32–36)
MCHC RBC-ENTMCNC: 33.5 GM/DL — SIGNIFICANT CHANGE UP (ref 32–36)
MCV RBC AUTO: 90.3 FL — SIGNIFICANT CHANGE UP (ref 80–100)
MCV RBC AUTO: 90.5 FL — SIGNIFICANT CHANGE UP (ref 80–100)
MONOCYTES # BLD AUTO: 0.67 K/UL — SIGNIFICANT CHANGE UP (ref 0–0.9)
MONOCYTES # BLD AUTO: 0.86 K/UL — SIGNIFICANT CHANGE UP (ref 0–0.9)
MONOCYTES NFR BLD AUTO: 3.9 % — SIGNIFICANT CHANGE UP (ref 2–14)
MONOCYTES NFR BLD AUTO: 3.9 % — SIGNIFICANT CHANGE UP (ref 2–14)
NEUTROPHILS # BLD AUTO: 14.01 K/UL — HIGH (ref 1.8–7.4)
NEUTROPHILS # BLD AUTO: 19.47 K/UL — HIGH (ref 1.8–7.4)
NEUTROPHILS NFR BLD AUTO: 82.4 % — HIGH (ref 43–77)
NEUTROPHILS NFR BLD AUTO: 88.1 % — HIGH (ref 43–77)
NRBC # BLD: 0 /100 WBCS — SIGNIFICANT CHANGE UP (ref 0–0)
NRBC # BLD: 0 /100 WBCS — SIGNIFICANT CHANGE UP (ref 0–0)
NRBC # FLD: 0 K/UL — SIGNIFICANT CHANGE UP (ref 0–0)
NRBC # FLD: 0 K/UL — SIGNIFICANT CHANGE UP (ref 0–0)
PLATELET # BLD AUTO: 159 K/UL — SIGNIFICANT CHANGE UP (ref 150–400)
PLATELET # BLD AUTO: 176 K/UL — SIGNIFICANT CHANGE UP (ref 150–400)
POTASSIUM SERPL-MCNC: 4 MMOL/L — SIGNIFICANT CHANGE UP (ref 3.5–5.3)
POTASSIUM SERPL-MCNC: 4.2 MMOL/L — SIGNIFICANT CHANGE UP (ref 3.5–5.3)
POTASSIUM SERPL-SCNC: 4 MMOL/L — SIGNIFICANT CHANGE UP (ref 3.5–5.3)
POTASSIUM SERPL-SCNC: 4.2 MMOL/L — SIGNIFICANT CHANGE UP (ref 3.5–5.3)
PROT SERPL-MCNC: 5.5 G/DL — LOW (ref 6–8.3)
PROT SERPL-MCNC: 5.7 G/DL — LOW (ref 6–8.3)
PROTHROM AB SERPL-ACNC: 10.3 SEC — LOW (ref 10.5–13.4)
PROTHROM AB SERPL-ACNC: 9.5 SEC — LOW (ref 10.5–13.4)
RBC # BLD: 3.38 M/UL — LOW (ref 3.8–5.2)
RBC # BLD: 3.8 M/UL — SIGNIFICANT CHANGE UP (ref 3.8–5.2)
RBC # FLD: 14 % — SIGNIFICANT CHANGE UP (ref 10.3–14.5)
RBC # FLD: 14.4 % — SIGNIFICANT CHANGE UP (ref 10.3–14.5)
SODIUM SERPL-SCNC: 130 MMOL/L — LOW (ref 135–145)
SODIUM SERPL-SCNC: 131 MMOL/L — LOW (ref 135–145)
URATE SERPL-MCNC: 10.2 MG/DL — HIGH (ref 2.5–7)
URATE SERPL-MCNC: 10.6 MG/DL — HIGH (ref 2.5–7)
WBC # BLD: 17.01 K/UL — HIGH (ref 3.8–10.5)
WBC # BLD: 22.09 K/UL — HIGH (ref 3.8–10.5)
WBC # FLD AUTO: 17.01 K/UL — HIGH (ref 3.8–10.5)
WBC # FLD AUTO: 22.09 K/UL — HIGH (ref 3.8–10.5)

## 2023-06-18 RX ORDER — IBUPROFEN 200 MG
600 TABLET ORAL EVERY 6 HOURS
Refills: 0 | Status: DISCONTINUED | OUTPATIENT
Start: 2023-06-18 | End: 2023-06-19

## 2023-06-18 RX ORDER — SODIUM CHLORIDE 9 MG/ML
1000 INJECTION, SOLUTION INTRAVENOUS
Refills: 0 | Status: DISCONTINUED | OUTPATIENT
Start: 2023-06-18 | End: 2023-06-19

## 2023-06-18 RX ORDER — HEPARIN SODIUM 5000 [USP'U]/ML
5000 INJECTION INTRAVENOUS; SUBCUTANEOUS EVERY 12 HOURS
Refills: 0 | Status: DISCONTINUED | OUTPATIENT
Start: 2023-06-18 | End: 2023-06-21

## 2023-06-18 RX ORDER — MAGNESIUM SULFATE 500 MG/ML
1.5 VIAL (ML) INJECTION
Qty: 40 | Refills: 0 | Status: DISCONTINUED | OUTPATIENT
Start: 2023-06-18 | End: 2023-06-18

## 2023-06-18 RX ORDER — METOCLOPRAMIDE HCL 10 MG
5 TABLET ORAL EVERY 6 HOURS
Refills: 0 | Status: DISCONTINUED | OUTPATIENT
Start: 2023-06-18 | End: 2023-06-21

## 2023-06-18 RX ADMIN — HEPARIN SODIUM 5000 UNIT(S): 5000 INJECTION INTRAVENOUS; SUBCUTANEOUS at 09:51

## 2023-06-18 RX ADMIN — Medication 30 MILLIGRAM(S): at 17:37

## 2023-06-18 RX ADMIN — Medication 30 MILLIGRAM(S): at 11:51

## 2023-06-18 RX ADMIN — Medication 975 MILLIGRAM(S): at 21:31

## 2023-06-18 RX ADMIN — Medication 975 MILLIGRAM(S): at 22:20

## 2023-06-18 RX ADMIN — Medication 975 MILLIGRAM(S): at 15:11

## 2023-06-18 RX ADMIN — Medication 600 MILLIGRAM(S): at 23:05

## 2023-06-18 RX ADMIN — Medication 30 MILLIGRAM(S): at 12:59

## 2023-06-18 RX ADMIN — Medication 50 GM/HR: at 07:20

## 2023-06-18 RX ADMIN — Medication 975 MILLIGRAM(S): at 09:47

## 2023-06-18 RX ADMIN — Medication 975 MILLIGRAM(S): at 11:00

## 2023-06-18 RX ADMIN — HEPARIN SODIUM 5000 UNIT(S): 5000 INJECTION INTRAVENOUS; SUBCUTANEOUS at 21:30

## 2023-06-18 RX ADMIN — Medication 975 MILLIGRAM(S): at 16:10

## 2023-06-18 RX ADMIN — Medication 30 MILLIGRAM(S): at 18:35

## 2023-06-18 NOTE — PROVIDER CONTACT NOTE (CRITICAL VALUE NOTIFICATION) - ASSESSMENT
pt denies lightheadedness/ dizziness/ n/v
pt denies headache/ blurry vision / epigastric pain
VS WNL, Respiratory WNL, patient asymptomatic tolerating well

## 2023-06-18 NOTE — PROGRESS NOTE ADULT - SUBJECTIVE AND OBJECTIVE BOX
Postop Day  1  s/p   C- Section    THERAPY:  [ X] Spinal morphine         Sedation Score:	  [ X] Alert	    [  ] Drowsy        [  ] Arousable	[  ] Asleep	[  ] Unresponsive    Side Effects:	  [ X] None	     [  ] Nausea        [  ] Pruritus        [  ] Weakness   [  ] Numbness      Denies headache, able to ambulate, tolerating diet. Patient remains on magnesium sulfate so she still has rubi catheter in place.     ASSESSMENT/ PLAN   Change to po prn pain meds 24 hours post Spinal Morphine.  [ x ]Documentation and Verification of current medications

## 2023-06-18 NOTE — LACTATION INITIAL EVALUATION - LACTATION INTERVENTIONS
initiate/review safe skin-to-skin/initiate/review hand expression/review techniques to manage sore nipples/engorgement/initiate/review finger suck/initiate/review breast massage/compression/reviewed components of an effective feeding and at least 8 effective feedings per day required/reviewed importance of monitoring infant diapers, the breastfeeding log, and minimum output each day/reviewed risks of unnecessary formula supplementation/reviewed risks of artificial nipples/reviewed strategies to transition to breastfeeding only/reviewed benefits and recommendations for rooming in/reviewed feeding on demand/by cue at least 8 times a day/reviewed indications of inadequate milk transfer that would require supplementation

## 2023-06-18 NOTE — PROGRESS NOTE ADULT - SUBJECTIVE AND OBJECTIVE BOX
OB Progress Note:  Delivery, POD#1    S: 28yo POD#1 s/p LTCS . Her pain is well controlled. She is tolerating a regular diet and passing flatus. Denies N/V. Denies CP/SOB/lightheadedness/dizziness.   She is ambulating without difficulty.   Voiding spontaneously.     O:   Vital Signs Last 24 Hrs  T(C): 36.6 (2023 06:30), Max: 36.9 (2023 20:30)  T(F): 97.8 (2023 06:30), Max: 98.4 (2023 20:30)  HR: 85 (2023 06:30) (74 - 123)  BP: 138/82 (2023 06:30) (92/58 - 168/104)  BP(mean): 105 (2023 19:00) (74 - 105)  RR: 18 (2023 06:30) (13 - 22)  SpO2: 98% (2023 06:30) (89% - 100%)        Labs:  Blood type: A Positive  Rubella IgG: RPR: Negative                          11.5   22.09<H> >-----------< 159    (  @ 05:55 )             34.3<L>                        11.5   16.24<H> >-----------< 121<L>    (  @ 15:30 )             34.5                        12.9   18.01<H> >-----------< 148<L>    (  @ 08:00 )             38.0                        12.6   14.70<H> >-----------< 128<L>    (  @ 23:07 )             39.0                        12.1   8.51 >-----------< 123<L>    (  @ 10:30 )             36.9                        12.2   10.36 >-----------< 116<L>    ( 06-15 @ 22:00 )             37.5                        11.7   7.51 >-----------< 112<L>    ( 06-15 @ 09:58 )             35.7    23 @ 05:55      130<L>  |  100  |  19  ----------------------------<  96  4.2   |  16<L>  |  1.30    23 @ 15:30      134<L>  |  105  |  19  ----------------------------<  147<H>  4.2   |  18<L>  |  1.30    23 @ 08:00      135  |  106  |  19  ----------------------------<  101<H>  3.8   |  16<L>  |  1.24    23 @ 23:07      136  |  107  |  18  ----------------------------<  130<H>  4.4   |  16<L>  |  1.17    23 @ 10:30      137  |  108<H>  |  16  ----------------------------<  77  4.0   |  18<L>  |  0.99    06-15-23 @ 22:00      138  |  109<H>  |  17  ----------------------------<  75  4.7   |  19<L>  |  0.95    06-15-23 @ 09:58      136  |  106  |  16  ----------------------------<  76  4.0   |  19<L>  |  0.87        Ca    7.9<L>      2023 05:55  Ca    7.7<L>      2023 15:30  Ca    8.3<L>      2023 08:00  Ca    7.9<L>      2023 23:07  Ca    8.0<L>      2023 10:30  Ca    7.9<L>      15 Madhu 2023 22:00  Ca    8.0<L>      15 Madhu 2023 09:58  Mg     8.50<HH>       Mg     7.90<HH>       Mg     >9.70<HH>       Mg     6.90<H>         TPro  5.7<L>  /  Alb  2.7<L>  /  TBili  0.2  /  DBili  x   /  AST  30  /  ALT  21  /  AlkPhos  138<H>  23 @ 05:55  TPro  5.0<L>  /  Alb  2.7<L>  /  TBili  0.2  /  DBili  x   /  AST  33<H>  /  ALT  23  /  AlkPhos  138<H>  23 @ 15:30  TPro  5.7<L>  /  Alb  3.3  /  TBili  0.4  /  DBili  x   /  AST  32  /  ALT  21  /  AlkPhos  160<H>  23 @ 08:00  TPro  5.7<L>  /  Alb  3.0<L>  /  TBili  0.4  /  DBili  x   /  AST  31  /  ALT  20  /  AlkPhos  149<H>  23 @ 23:07  TPro  5.3<L>  /  Alb  3.0<L>  /  TBili  0.3  /  DBili  x   /  AST  25  /  ALT  19  /  AlkPhos  136<H>  23 @ 10:30  TPro  5.4<L>  /  Alb  2.8<L>  /  TBili  0.3  /  DBili  x   /  AST  27  /  ALT  20  /  AlkPhos  130<H>  06-15-23 @ 22:00  TPro  5.1<L>  /  Alb  2.9<L>  /  TBili  0.2  /  DBili  x   /  AST  26  /  ALT  17  /  AlkPhos  125<H>  06-15-23 @ 09:58          PE:  General: NAD  Abdomen: Mildly distended, appropriately tender, incision c/d/i.  Extremities: No erythema, no pitting edema     OB Progress Note:  Delivery, POD#1    S: 26yo POD#1 s/p LTCS . Her pain is well controlled. She is tolerating a regular diet and passing flatus. Improved N/V since delivery. Denies CP/SOB/lightheadedness/dizziness.   Pt with elicia campuzano later today.  O:   Vital Signs Last 24 Hrs  T(C): 36.6 (2023 06:30), Max: 36.9 (2023 20:30)  T(F): 97.8 (2023 06:30), Max: 98.4 (2023 20:30)  HR: 85 (2023 06:30) (74 - 123)  BP: 138/82 (2023 06:30) (92/58 - 168/104)  BP(mean): 105 (2023 19:00) (74 - 105)  RR: 18 (2023 06:30) (13 - 22)  SpO2: 98% (2023 06:30) (89% - 100%)        Labs:  Blood type: A Positive  Rubella IgG: RPR: Negative                          11.5   22.09<H> >-----------< 159    (  @ 05:55 )             34.3<L>                        11.5   16.24<H> >-----------< 121<L>    (  @ 15:30 )             34.5                        12.9   18.01<H> >-----------< 148<L>    (  @ 08:00 )             38.0                        12.6   14.70<H> >-----------< 128<L>    (  @ 23:07 )             39.0                        12.1   8.51 >-----------< 123<L>    (  @ 10:30 )             36.9                        12.2   10.36 >-----------< 116<L>    ( 06-15 @ 22:00 )             37.5                        11.7   7.51 >-----------< 112<L>    ( 06-15 @ 09:58 )             35.7    23 @ 05:55      130<L>  |  100  |  19  ----------------------------<  96  4.2   |  16<L>  |  1.30    23 @ 15:30      134<L>  |  105  |  19  ----------------------------<  147<H>  4.2   |  18<L>  |  1.30    23 @ 08:00      135  |  106  |  19  ----------------------------<  101<H>  3.8   |  16<L>  |  1.24    23 @ 23:07      136  |  107  |  18  ----------------------------<  130<H>  4.4   |  16<L>  |  1.17    23 @ 10:30      137  |  108<H>  |  16  ----------------------------<  77  4.0   |  18<L>  |  0.99    06-15-23 @ 22:00      138  |  109<H>  |  17  ----------------------------<  75  4.7   |  19<L>  |  0.95    06-15-23 @ 09:58      136  |  106  |  16  ----------------------------<  76  4.0   |  19<L>  |  0.87        Ca    7.9<L>      2023 05:55  Ca    7.7<L>      2023 15:30  Ca    8.3<L>      2023 08:00  Ca    7.9<L>      2023 23:07  Ca    8.0<L>      2023 10:30  Ca    7.9<L>      15 Madhu 2023 22:00  Ca    8.0<L>      15 Madhu 2023 09:58  Mg     8.50<HH>       Mg     7.90<HH>       Mg     >9.70<HH>       Mg     6.90<H>         TPro  5.7<L>  /  Alb  2.7<L>  /  TBili  0.2  /  DBili  x   /  AST  30  /  ALT  21  /  AlkPhos  138<H>  23 @ 05:55  TPro  5.0<L>  /  Alb  2.7<L>  /  TBili  0.2  /  DBili  x   /  AST  33<H>  /  ALT  23  /  AlkPhos  138<H>  23 @ 15:30  TPro  5.7<L>  /  Alb  3.3  /  TBili  0.4  /  DBili  x   /  AST  32  /  ALT  21  /  AlkPhos  160<H>  23 @ 08:00  TPro  5.7<L>  /  Alb  3.0<L>  /  TBili  0.4  /  DBili  x   /  AST  31  /  ALT  20  /  AlkPhos  149<H>  23 @ 23:07  TPro  5.3<L>  /  Alb  3.0<L>  /  TBili  0.3  /  DBili  x   /  AST  25  /  ALT  19  /  AlkPhos  136<H>  23 @ 10:30  TPro  5.4<L>  /  Alb  2.8<L>  /  TBili  0.3  /  DBili  x   /  AST  27  /  ALT  20  /  AlkPhos  130<H>  06-15-23 @ 22:00  TPro  5.1<L>  /  Alb  2.9<L>  /  TBili  0.2  /  DBili  x   /  AST  26  /  ALT  17  /  AlkPhos  125<H>  06-15-23 @ 09:58          PE:  General: NAD  Abdomen: Mildly distended, appropriately tender, incision c/d/i.  Extremities: No erythema, no pitting edema

## 2023-06-18 NOTE — PROVIDER CONTACT NOTE (CRITICAL VALUE NOTIFICATION) - NAME OF MD/NP/PA/DO NOTIFIED:
Dr Eileen Mendieta
Nancy Palacios MD
reported to primary RN Elena Alejo , primary RN to report to provider

## 2023-06-18 NOTE — PROVIDER CONTACT NOTE (CRITICAL VALUE NOTIFICATION) - SITUATION
Serum Magnesium resulted 8.5
Magnesium Level above 9.7 for pt.   MERY Alejo notified.
Magnesium level >9.7
pt's magnesium level- 9.2

## 2023-06-18 NOTE — PROVIDER CONTACT NOTE (CRITICAL VALUE NOTIFICATION) - BACKGROUND
pt is s/p c/s from 6/17 @ 3321. pt is sPEC on magnesium sulfate drip. pt's previous mag level-9.7. Magnesium drip was held for 1 hr and restarted at 1 1/2 gm / hr
s/p c/s from 6/17 @ 3330. pt is sPEC on magnesium sulfate drip.
CS from 6/16 on magnesium sulfate

## 2023-06-18 NOTE — PROVIDER CONTACT NOTE (CRITICAL VALUE NOTIFICATION) - ACTION/TREATMENT ORDERED:
No new orders at this time
stop magnesium drip for 1 hr. Restart at 1 1/2 gm/hr after 1 hr
will d/c magnesium drip

## 2023-06-19 LAB
ALBUMIN SERPL ELPH-MCNC: 2.6 G/DL — LOW (ref 3.3–5)
ALP SERPL-CCNC: 111 U/L — SIGNIFICANT CHANGE UP (ref 40–120)
ALT FLD-CCNC: 14 U/L — SIGNIFICANT CHANGE UP (ref 4–33)
ANION GAP SERPL CALC-SCNC: 12 MMOL/L — SIGNIFICANT CHANGE UP (ref 7–14)
APTT BLD: 29.7 SEC — SIGNIFICANT CHANGE UP (ref 27–36.3)
AST SERPL-CCNC: 27 U/L — SIGNIFICANT CHANGE UP (ref 4–32)
BASOPHILS # BLD AUTO: 0.04 K/UL — SIGNIFICANT CHANGE UP (ref 0–0.2)
BASOPHILS NFR BLD AUTO: 0.3 % — SIGNIFICANT CHANGE UP (ref 0–2)
BILIRUB SERPL-MCNC: <0.2 MG/DL — SIGNIFICANT CHANGE UP (ref 0.2–1.2)
BUN SERPL-MCNC: 24 MG/DL — HIGH (ref 7–23)
CALCIUM SERPL-MCNC: 7.5 MG/DL — LOW (ref 8.4–10.5)
CHLORIDE SERPL-SCNC: 103 MMOL/L — SIGNIFICANT CHANGE UP (ref 98–107)
CO2 SERPL-SCNC: 20 MMOL/L — LOW (ref 22–31)
CREAT SERPL-MCNC: 1.23 MG/DL — SIGNIFICANT CHANGE UP (ref 0.5–1.3)
EGFR: 63 ML/MIN/1.73M2 — SIGNIFICANT CHANGE UP
EOSINOPHIL # BLD AUTO: 0.14 K/UL — SIGNIFICANT CHANGE UP (ref 0–0.5)
EOSINOPHIL NFR BLD AUTO: 1.1 % — SIGNIFICANT CHANGE UP (ref 0–6)
GLUCOSE SERPL-MCNC: 100 MG/DL — HIGH (ref 70–99)
HCT VFR BLD CALC: 25.8 % — LOW (ref 34.5–45)
HGB BLD-MCNC: 8.6 G/DL — LOW (ref 11.5–15.5)
IANC: 10.55 K/UL — HIGH (ref 1.8–7.4)
IMM GRANULOCYTES NFR BLD AUTO: 0.9 % — SIGNIFICANT CHANGE UP (ref 0–0.9)
INR BLD: <0.9 RATIO — LOW (ref 0.88–1.16)
LDH SERPL L TO P-CCNC: 222 U/L — SIGNIFICANT CHANGE UP (ref 135–225)
LYMPHOCYTES # BLD AUTO: 1.7 K/UL — SIGNIFICANT CHANGE UP (ref 1–3.3)
LYMPHOCYTES # BLD AUTO: 12.9 % — LOW (ref 13–44)
MCHC RBC-ENTMCNC: 30.2 PG — SIGNIFICANT CHANGE UP (ref 27–34)
MCHC RBC-ENTMCNC: 33.3 GM/DL — SIGNIFICANT CHANGE UP (ref 32–36)
MCV RBC AUTO: 90.5 FL — SIGNIFICANT CHANGE UP (ref 80–100)
MONOCYTES # BLD AUTO: 0.58 K/UL — SIGNIFICANT CHANGE UP (ref 0–0.9)
MONOCYTES NFR BLD AUTO: 4.4 % — SIGNIFICANT CHANGE UP (ref 2–14)
NEUTROPHILS # BLD AUTO: 10.55 K/UL — HIGH (ref 1.8–7.4)
NEUTROPHILS NFR BLD AUTO: 80.4 % — HIGH (ref 43–77)
NRBC # BLD: 0 /100 WBCS — SIGNIFICANT CHANGE UP (ref 0–0)
NRBC # FLD: 0 K/UL — SIGNIFICANT CHANGE UP (ref 0–0)
PLATELET # BLD AUTO: 151 K/UL — SIGNIFICANT CHANGE UP (ref 150–400)
POTASSIUM SERPL-MCNC: 4.1 MMOL/L — SIGNIFICANT CHANGE UP (ref 3.5–5.3)
POTASSIUM SERPL-SCNC: 4.1 MMOL/L — SIGNIFICANT CHANGE UP (ref 3.5–5.3)
PROT SERPL-MCNC: 5 G/DL — LOW (ref 6–8.3)
PROTHROM AB SERPL-ACNC: 9.4 SEC — LOW (ref 10.5–13.4)
RBC # BLD: 2.85 M/UL — LOW (ref 3.8–5.2)
RBC # FLD: 14.5 % — SIGNIFICANT CHANGE UP (ref 10.3–14.5)
SODIUM SERPL-SCNC: 135 MMOL/L — SIGNIFICANT CHANGE UP (ref 135–145)
URATE SERPL-MCNC: 11 MG/DL — HIGH (ref 2.5–7)
WBC # BLD: 13.13 K/UL — HIGH (ref 3.8–10.5)
WBC # FLD AUTO: 13.13 K/UL — HIGH (ref 3.8–10.5)

## 2023-06-19 RX ORDER — OXYCODONE HYDROCHLORIDE 5 MG/1
5 TABLET ORAL
Refills: 0 | Status: DISCONTINUED | OUTPATIENT
Start: 2023-06-19 | End: 2023-06-21

## 2023-06-19 RX ORDER — NIFEDIPINE 30 MG
30 TABLET, EXTENDED RELEASE 24 HR ORAL ONCE
Refills: 0 | Status: COMPLETED | OUTPATIENT
Start: 2023-06-19 | End: 2023-06-19

## 2023-06-19 RX ADMIN — Medication 30 MILLIGRAM(S): at 19:03

## 2023-06-19 RX ADMIN — Medication 600 MILLIGRAM(S): at 05:09

## 2023-06-19 RX ADMIN — HEPARIN SODIUM 5000 UNIT(S): 5000 INJECTION INTRAVENOUS; SUBCUTANEOUS at 09:04

## 2023-06-19 RX ADMIN — Medication 975 MILLIGRAM(S): at 03:50

## 2023-06-19 RX ADMIN — Medication 975 MILLIGRAM(S): at 02:53

## 2023-06-19 RX ADMIN — Medication 600 MILLIGRAM(S): at 18:50

## 2023-06-19 RX ADMIN — Medication 600 MILLIGRAM(S): at 06:05

## 2023-06-19 RX ADMIN — Medication 600 MILLIGRAM(S): at 13:47

## 2023-06-19 RX ADMIN — Medication 975 MILLIGRAM(S): at 16:40

## 2023-06-19 RX ADMIN — Medication 600 MILLIGRAM(S): at 17:44

## 2023-06-19 RX ADMIN — Medication 600 MILLIGRAM(S): at 12:36

## 2023-06-19 RX ADMIN — Medication 975 MILLIGRAM(S): at 20:41

## 2023-06-19 RX ADMIN — Medication 975 MILLIGRAM(S): at 15:29

## 2023-06-19 RX ADMIN — Medication 975 MILLIGRAM(S): at 09:04

## 2023-06-19 RX ADMIN — OXYCODONE HYDROCHLORIDE 5 MILLIGRAM(S): 5 TABLET ORAL at 17:55

## 2023-06-19 RX ADMIN — Medication 600 MILLIGRAM(S): at 00:05

## 2023-06-19 RX ADMIN — Medication 975 MILLIGRAM(S): at 21:13

## 2023-06-19 RX ADMIN — HEPARIN SODIUM 5000 UNIT(S): 5000 INJECTION INTRAVENOUS; SUBCUTANEOUS at 21:10

## 2023-06-19 RX ADMIN — OXYCODONE HYDROCHLORIDE 5 MILLIGRAM(S): 5 TABLET ORAL at 18:50

## 2023-06-19 RX ADMIN — Medication 975 MILLIGRAM(S): at 10:40

## 2023-06-19 NOTE — CHART NOTE - NSCHARTNOTEFT_GEN_A_CORE
At bedside with pt and partner  Given suboptimal pain control, pt elective for primary  delivery  Counseled on risks/benefits/alternatives of  section including but not limited to bleeding, infection, pain, damage to pelvic/abdominal organs/nerves/blood vessels, need for emergent life-saving hysterectomy Pt agrees to  section. Consents signed and witnessed at bedside.   Anesthesia to bedside to discuss possibility of GETA  Charge RN aware, OR opened    iain JEFFERSON
R4 note    Blood pressures reviewed, most recently w/ severe range BP of 168/96.  On review on BP logs, patient has had multiple severe range BPs greater than 4 hours apart, meeting diagnosis for sPEC.  This diagnosis was discussed w/ the patient as well as the recommendation for magnesium sulfate for seizure prophylaxis which she is amenable to.     FHT reviewed baseline 125, mod rosangela, +Accels, no decels.    Pain control has continued to be an issue w/ replacement of epidural with continued minimal support.  On review with anesthesia, pain relief through epidural will be limited due to spinal fusion.  We will continue w/ top offs that help to lower pain as well as supplemental IV pain medications such as morphine and stadol for pain control.  Pt was made aware of expectation that she will not have pain control as expected from epidural due to anatomic limitations from history of spinal fusion.    Pt expressed understanding of all of the above w/ all questions answered.    MGreenman PGY4
Patient is s/p magnesium sulfate for SPEC with increasing BPs 144/99 & 148/88.  Patient to be started on procardia 30XL.    d/w Dr. Yun

## 2023-06-19 NOTE — PROGRESS NOTE ADULT - SUBJECTIVE AND OBJECTIVE BOX
OB Postpartum Note:  Delivery    S: 28yo now POD #2 s/p LTCS. Her pain is well controlled. She is tolerating a regular diet and is passing flatus. Voiding spontaneously and ambulating without difficulty. Denies N/V. Denies CP/SOB/lightheadedness/dizziness.     O:   Vitals:  Vital Signs Last 24 Hrs  T(C): 36.7 (2023 06:12), Max: 36.9 (2023 18:00)  T(F): 98 (2023 06:12), Max: 98.5 (2023 18:00)  HR: 78 (2023 06:12) (78 - 101)  BP: 127/79 (2023 06:12) (127/79 - 140/90)  BP(mean): --  RR: 18 (2023 06:12) (16 - 20)  SpO2: 100% (2023 06:12) (97% - 100%)    Parameters below as of 2023 21:44  Patient On (Oxygen Delivery Method): room air        MEDICATIONS  (STANDING):  acetaminophen     Tablet .. 975 milliGRAM(s) Oral <User Schedule>  acetaminophen   IVPB .. 1000 milliGRAM(s) IV Intermittent once  diphtheria/tetanus/pertussis (acellular) Vaccine (Adacel) 0.5 milliLiter(s) IntraMuscular once  famotidine Injectable 20 milliGRAM(s) IV Push once  heparin   Injectable 5000 Unit(s) SubCutaneous every 12 hours  ibuprofen  Tablet. 600 milliGRAM(s) Oral every 6 hours  ketorolac   Injectable 30 milliGRAM(s) IV Push every 6 hours  lactated ringers. 1000 milliLiter(s) (50 mL/Hr) IV Continuous <Continuous>  morphine PF Spinal 0.1 milliGRAM(s) IntraThecal. once  oxytocin Infusion 333.333 milliUNIT(s)/Min (1000 mL/Hr) IV Continuous <Continuous>    MEDICATIONS  (PRN):  diphenhydrAMINE 25 milliGRAM(s) Oral every 6 hours PRN Pruritus  lanolin Ointment 1 Application(s) Topical every 6 hours PRN Sore Nipples  magnesium hydroxide Suspension 30 milliLiter(s) Oral two times a day PRN Constipation  metoclopramide 5 milliGRAM(s) Oral every 6 hours PRN nausea  nalbuphine Injectable 2.5 milliGRAM(s) IV Push every 6 hours PRN Pruritus  naloxone Injectable 0.1 milliGRAM(s) IV Push every 3 minutes PRN For ANY of the following changes in patient status:  A. RR LESS THAN 10 breaths per minute, B. Oxygen saturation LESS THAN 90%, C. Sedation score of 6  ondansetron Injectable 4 milliGRAM(s) IV Push every 6 hours PRN Nausea  oxyCODONE    IR 5 milliGRAM(s) Oral every 3 hours PRN Mild Pain (1 - 3)  oxyCODONE    IR 10 milliGRAM(s) Oral every 3 hours PRN Moderate Pain (4 - 6)  oxyCODONE    IR 5 milliGRAM(s) Oral every 3 hours PRN Moderate to Severe Pain (4-10)  oxyCODONE    IR 5 milliGRAM(s) Oral once PRN Moderate to Severe Pain (4-10)  simethicone 80 milliGRAM(s) Chew every 4 hours PRN Gas      Labs:  Blood type: A Positive  Rubella IgG: RPR: Negative                          8.6<L>   13.13<H> >-----------< 151    (  @ 05:38 )             25.8<L>                        10.2<L>   17.01<H> >-----------< 176    (  17:45 )             30.6<L>                        11.5   22.09<H> >-----------< 159    (  @ 05:55 )             34.3<L>                        11.5   16.24<H> >-----------< 121<L>    (  @ 15:30 )             34.5                        12.9   18.01<H> >-----------< 148<L>    (  @ 08:00 )             38.0                        12.6   14.70<H> >-----------< 128<L>    (  @ 23:07 )             39.0                        12.1   8.51 >-----------< 123<L>    (  @ 10:30 )             36.9    23 @ 05:38      135  |  103  |  x   ----------------------------<  x   4.1   |  x   |  x     23 @ 17:45      131<L>  |  100  |  22  ----------------------------<  103<H>  4.0   |  20<L>  |  1.25    23 @ 05:55      130<L>  |  100  |  19  ----------------------------<  96  4.2   |  16<L>  |  1.30    23 @ 15:30      134<L>  |  105  |  19  ----------------------------<  147<H>  4.2   |  18<L>  |  1.30    23 @ 08:00      135  |  106  |  19  ----------------------------<  101<H>  3.8   |  16<L>  |  1.24    23 @ 23:07      136  |  107  |  18  ----------------------------<  130<H>  4.4   |  16<L>  |  1.17    23 @ 10:30      137  |  108<H>  |  16  ----------------------------<  77  4.0   |  18<L>  |  0.99        Ca    7.5<L>      2023 17:45  Ca    7.9<L>      2023 05:55  Ca    7.7<L>      2023 15:30  Ca    8.3<L>      2023 08:00  Ca    7.9<L>      2023 23:07  Ca    8.0<L>      2023 10:30  Mg     9.20<HH>     06-18  Mg     >9.70     -18  Mg     8.50<HH>     18  Mg     7.90<HH>     17  Mg     >9.70<HH>       Mg     6.90<H>         TPro  5.5<L>  /  Alb  2.7<L>  /  TBili  <0.2  /  DBili  x   /  AST  31  /  ALT  17  /  AlkPhos  124<H>  23 @ 17:45  TPro  5.7<L>  /  Alb  2.7<L>  /  TBili  0.2  /  DBili  x   /  AST  30  /  ALT  21  /  AlkPhos  138<H>  23 @ 05:55  TPro  5.0<L>  /  Alb  2.7<L>  /  TBili  0.2  /  DBili  x   /  AST  33<H>  /  ALT  23  /  AlkPhos  138<H>  23 @ 15:30  TPro  5.7<L>  /  Alb  3.3  /  TBili  0.4  /  DBili  x   /  AST  32  /  ALT  21  /  AlkPhos  160<H>  23 @ 08:00  TPro  5.7<L>  /  Alb  3.0<L>  /  TBili  0.4  /  DBili  x   /  AST  31  /  ALT  20  /  AlkPhos  149<H>  23 @ 23:07  TPro  5.3<L>  /  Alb  3.0<L>  /  TBili  0.3  /  DBili  x   /  AST  25  /  ALT  19  /  AlkPhos  136<H>  23 @ 10:30          PE:  General: NAD, patient resting comfortably in bed  Abdomen: Mildly distended, appropriately tender. No rebound tenderness or guarding. Incision c/d/i.  Extremities: SCDs in place, no erythema

## 2023-06-19 NOTE — PROVIDER CONTACT NOTE (OTHER) - ASSESSMENT
pt denies headache / epigastric pain / blurry vision/ lightheadedness/ dizziness /   pt c/o incisional pain- motrin and oxycodone given
Pt was crying at time of BP assessment due to feeling "restless", pt requested something to help her sleep. Pt asymptomatic.

## 2023-06-20 ENCOUNTER — APPOINTMENT (OUTPATIENT)
Dept: ANTEPARTUM | Facility: CLINIC | Age: 28
End: 2023-06-20

## 2023-06-20 ENCOUNTER — NON-APPOINTMENT (OUTPATIENT)
Age: 28
End: 2023-06-20

## 2023-06-20 LAB
ALBUMIN SERPL ELPH-MCNC: 2.9 G/DL — LOW (ref 3.3–5)
ALP SERPL-CCNC: 117 U/L — SIGNIFICANT CHANGE UP (ref 40–120)
ALT FLD-CCNC: 19 U/L — SIGNIFICANT CHANGE UP (ref 4–33)
ANION GAP SERPL CALC-SCNC: 12 MMOL/L — SIGNIFICANT CHANGE UP (ref 7–14)
APTT BLD: 31.8 SEC — SIGNIFICANT CHANGE UP (ref 27–36.3)
AST SERPL-CCNC: 29 U/L — SIGNIFICANT CHANGE UP (ref 4–32)
BASOPHILS # BLD AUTO: 0.04 K/UL — SIGNIFICANT CHANGE UP (ref 0–0.2)
BASOPHILS NFR BLD AUTO: 0.3 % — SIGNIFICANT CHANGE UP (ref 0–2)
BILIRUB SERPL-MCNC: <0.2 MG/DL — SIGNIFICANT CHANGE UP (ref 0.2–1.2)
BUN SERPL-MCNC: 13 MG/DL — SIGNIFICANT CHANGE UP (ref 7–23)
CALCIUM SERPL-MCNC: 8.6 MG/DL — SIGNIFICANT CHANGE UP (ref 8.4–10.5)
CHLORIDE SERPL-SCNC: 102 MMOL/L — SIGNIFICANT CHANGE UP (ref 98–107)
CO2 SERPL-SCNC: 25 MMOL/L — SIGNIFICANT CHANGE UP (ref 22–31)
CREAT SERPL-MCNC: 0.7 MG/DL — SIGNIFICANT CHANGE UP (ref 0.5–1.3)
EGFR: 123 ML/MIN/1.73M2 — SIGNIFICANT CHANGE UP
EOSINOPHIL # BLD AUTO: 0.18 K/UL — SIGNIFICANT CHANGE UP (ref 0–0.5)
EOSINOPHIL NFR BLD AUTO: 1.2 % — SIGNIFICANT CHANGE UP (ref 0–6)
FIBRINOGEN PPP-MCNC: 603 MG/DL — HIGH (ref 200–465)
GLUCOSE SERPL-MCNC: 104 MG/DL — HIGH (ref 70–99)
HCT VFR BLD CALC: 29.3 % — LOW (ref 34.5–45)
HGB BLD-MCNC: 9.8 G/DL — LOW (ref 11.5–15.5)
IANC: 12.82 K/UL — HIGH (ref 1.8–7.4)
IMM GRANULOCYTES NFR BLD AUTO: 0.7 % — SIGNIFICANT CHANGE UP (ref 0–0.9)
INR BLD: 0.88 RATIO — SIGNIFICANT CHANGE UP (ref 0.88–1.16)
LDH SERPL L TO P-CCNC: 241 U/L — HIGH (ref 135–225)
LYMPHOCYTES # BLD AUTO: 1.52 K/UL — SIGNIFICANT CHANGE UP (ref 1–3.3)
LYMPHOCYTES # BLD AUTO: 10 % — LOW (ref 13–44)
MCHC RBC-ENTMCNC: 29.6 PG — SIGNIFICANT CHANGE UP (ref 27–34)
MCHC RBC-ENTMCNC: 33.4 GM/DL — SIGNIFICANT CHANGE UP (ref 32–36)
MCV RBC AUTO: 88.5 FL — SIGNIFICANT CHANGE UP (ref 80–100)
MONOCYTES # BLD AUTO: 0.53 K/UL — SIGNIFICANT CHANGE UP (ref 0–0.9)
MONOCYTES NFR BLD AUTO: 3.5 % — SIGNIFICANT CHANGE UP (ref 2–14)
NEUTROPHILS # BLD AUTO: 12.82 K/UL — HIGH (ref 1.8–7.4)
NEUTROPHILS NFR BLD AUTO: 84.3 % — HIGH (ref 43–77)
NRBC # BLD: 0 /100 WBCS — SIGNIFICANT CHANGE UP (ref 0–0)
NRBC # FLD: 0 K/UL — SIGNIFICANT CHANGE UP (ref 0–0)
PLATELET # BLD AUTO: 192 K/UL — SIGNIFICANT CHANGE UP (ref 150–400)
POTASSIUM SERPL-MCNC: 4.4 MMOL/L — SIGNIFICANT CHANGE UP (ref 3.5–5.3)
POTASSIUM SERPL-SCNC: 4.4 MMOL/L — SIGNIFICANT CHANGE UP (ref 3.5–5.3)
PROT SERPL-MCNC: 6 G/DL — SIGNIFICANT CHANGE UP (ref 6–8.3)
PROTHROM AB SERPL-ACNC: 10.2 SEC — LOW (ref 10.5–13.4)
RBC # BLD: 3.31 M/UL — LOW (ref 3.8–5.2)
RBC # FLD: 14.6 % — HIGH (ref 10.3–14.5)
SODIUM SERPL-SCNC: 139 MMOL/L — SIGNIFICANT CHANGE UP (ref 135–145)
URATE SERPL-MCNC: 7.5 MG/DL — HIGH (ref 2.5–7)
WBC # BLD: 15.2 K/UL — HIGH (ref 3.8–10.5)
WBC # FLD AUTO: 15.2 K/UL — HIGH (ref 3.8–10.5)

## 2023-06-20 RX ORDER — ACETAMINOPHEN 500 MG
3 TABLET ORAL
Qty: 0 | Refills: 0 | DISCHARGE

## 2023-06-20 RX ORDER — OXYCODONE HYDROCHLORIDE 5 MG/1
5 TABLET ORAL ONCE
Refills: 0 | Status: DISCONTINUED | OUTPATIENT
Start: 2023-06-20 | End: 2023-06-20

## 2023-06-20 RX ORDER — NIFEDIPINE 30 MG
30 TABLET, EXTENDED RELEASE 24 HR ORAL DAILY
Refills: 0 | Status: DISCONTINUED | OUTPATIENT
Start: 2023-06-20 | End: 2023-06-21

## 2023-06-20 RX ORDER — NIFEDIPINE 30 MG
1 TABLET, EXTENDED RELEASE 24 HR ORAL
Qty: 30 | Refills: 0
Start: 2023-06-20 | End: 2023-07-19

## 2023-06-20 RX ORDER — IBUPROFEN 200 MG
3 TABLET ORAL
Qty: 0 | Refills: 0 | DISCHARGE

## 2023-06-20 RX ADMIN — Medication 975 MILLIGRAM(S): at 15:15

## 2023-06-20 RX ADMIN — OXYCODONE HYDROCHLORIDE 5 MILLIGRAM(S): 5 TABLET ORAL at 13:40

## 2023-06-20 RX ADMIN — OXYCODONE HYDROCHLORIDE 5 MILLIGRAM(S): 5 TABLET ORAL at 07:55

## 2023-06-20 RX ADMIN — Medication 975 MILLIGRAM(S): at 02:10

## 2023-06-20 RX ADMIN — Medication 975 MILLIGRAM(S): at 22:34

## 2023-06-20 RX ADMIN — OXYCODONE HYDROCHLORIDE 5 MILLIGRAM(S): 5 TABLET ORAL at 19:42

## 2023-06-20 RX ADMIN — Medication 975 MILLIGRAM(S): at 09:05

## 2023-06-20 RX ADMIN — OXYCODONE HYDROCHLORIDE 5 MILLIGRAM(S): 5 TABLET ORAL at 05:59

## 2023-06-20 RX ADMIN — OXYCODONE HYDROCHLORIDE 5 MILLIGRAM(S): 5 TABLET ORAL at 05:07

## 2023-06-20 RX ADMIN — Medication 975 MILLIGRAM(S): at 14:45

## 2023-06-20 RX ADMIN — Medication 975 MILLIGRAM(S): at 09:35

## 2023-06-20 RX ADMIN — OXYCODONE HYDROCHLORIDE 5 MILLIGRAM(S): 5 TABLET ORAL at 07:08

## 2023-06-20 RX ADMIN — OXYCODONE HYDROCHLORIDE 5 MILLIGRAM(S): 5 TABLET ORAL at 20:40

## 2023-06-20 RX ADMIN — OXYCODONE HYDROCHLORIDE 5 MILLIGRAM(S): 5 TABLET ORAL at 13:10

## 2023-06-20 RX ADMIN — HEPARIN SODIUM 5000 UNIT(S): 5000 INJECTION INTRAVENOUS; SUBCUTANEOUS at 09:10

## 2023-06-20 RX ADMIN — Medication 975 MILLIGRAM(S): at 02:50

## 2023-06-20 RX ADMIN — Medication 975 MILLIGRAM(S): at 23:10

## 2023-06-20 RX ADMIN — SIMETHICONE 80 MILLIGRAM(S): 80 TABLET, CHEWABLE ORAL at 19:42

## 2023-06-20 RX ADMIN — Medication 30 MILLIGRAM(S): at 18:45

## 2023-06-20 NOTE — PROGRESS NOTE ADULT - SUBJECTIVE AND OBJECTIVE BOX
OB Postpartum Note:  Delivery    S: 28yo now POD #2 s/p pLTCS. Her pain is moderate at this time, NSAIDs were d'fabiola due to creatinine elevations. She is tolerating a regular diet and is passing flatus.   Voiding spontaneously and ambulating without difficulty. Denies N/V. Denies CP/SOB/lightheadedness/dizziness.     O:   Vitals:  Vital Signs Last 24 Hrs  T(C): 36.8 (2023 05:05), Max: 37.2 (2023 22:25)  T(F): 98.3 (2023 05:05), Max: 99 (2023 22:25)  HR: 100 (2023 05:05) (84 - 100)  BP: 140/84 (2023 05:05) (129/84 - 148/88)  BP(mean): --  RR: 18 (2023 05:05) (18 - 18)  SpO2: 100% (2023 05:05) (98% - 100%)    Parameters below as of 2023 05:05  Patient On (Oxygen Delivery Method): room air        MEDICATIONS  (STANDING):  acetaminophen     Tablet .. 975 milliGRAM(s) Oral <User Schedule>  diphtheria/tetanus/pertussis (acellular) Vaccine (Adacel) 0.5 milliLiter(s) IntraMuscular once  famotidine Injectable 20 milliGRAM(s) IV Push once  heparin   Injectable 5000 Unit(s) SubCutaneous every 12 hours  NIFEdipine XL 30 milliGRAM(s) Oral daily    MEDICATIONS  (PRN):  diphenhydrAMINE 25 milliGRAM(s) Oral every 6 hours PRN Pruritus  lanolin Ointment 1 Application(s) Topical every 6 hours PRN Sore Nipples  magnesium hydroxide Suspension 30 milliLiter(s) Oral two times a day PRN Constipation  metoclopramide 5 milliGRAM(s) Oral every 6 hours PRN nausea  oxyCODONE    IR 5 milliGRAM(s) Oral every 3 hours PRN Moderate to Severe Pain (4-10)  oxyCODONE    IR 5 milliGRAM(s) Oral once PRN Moderate to Severe Pain (4-10)  simethicone 80 milliGRAM(s) Chew every 4 hours PRN Gas      Labs:  Blood type: A Positive  Rubella IgG: RPR: Negative                          8.6<L>   13.13<H> >-----------< 151    (  05:38 )             25.8<L>                        10.2<L>   17.01<H> >-----------< 176    (  17:45 )             30.6<L>                        11.5   22.09<H> >-----------< 159    (  @ 05:55 )             34.3<L>                        11.5   16.24<H> >-----------< 121<L>    (  @ 15:30 )             34.5    23 @ 05:38      135  |  103  |  24<H>  ----------------------------<  100<H>  4.1   |  20<L>  |  1.23    23 @ 17:45      131<L>  |  100  |  22  ----------------------------<  103<H>  4.0   |  20<L>  |  1.25    23 @ 05:55      130<L>  |  100  |  19  ----------------------------<  96  4.2   |  16<L>  |  1.30    23 15:30      134<L>  |  105  |  19  ----------------------------<  147<H>  4.2   |  18<L>  |  1.30        Ca    7.5<L>      2023 05:38  Ca    7.5<L>      2023 17:45  Ca    7.9<L>      2023 05:55  Ca    7.7<L>      2023 15:30  Mg     9.20<HH>     06-18  Mg     >9.70     06-18  Mg     8.50<HH>     06-18  Mg     7.90<HH>     06-17  Mg     >9.70<HH>     06-17    TPro  5.0<L>  /  Alb  2.6<L>  /  TBili  <0.2  /  DBili  x   /  AST  27  /  ALT  14  /  AlkPhos  111  23 @ 05:38  TPro  5.5<L>  /  Alb  2.7<L>  /  TBili  <0.2  /  DBili  x   /  AST  31  /  ALT  17  /  AlkPhos  124<H>  23 @ 17:45  TPro  5.7<L>  /  Alb  2.7<L>  /  TBili  0.2  /  DBili  x   /  AST  30  /  ALT  21  /  AlkPhos  138<H>  23 @ 05:55  TPro  5.0<L>  /  Alb  2.7<L>  /  TBili  0.2  /  DBili  x   /  AST  33<H>  /  ALT  23  /  AlkPhos  138<H>  23 @ 15:30          PE:  General: NAD, patient resting comfortably in bed  Abdomen: Mildly distended, appropriately tender. No rebound tenderness or guarding. Incision c/d/i.  Extremities: SCDs in place, no erythema   OB Postpartum Note:  Delivery    S: 26yo now POD #3 s/p pLTCS. Her pain is moderate at this time, NSAIDs were d'fabiola due to creatinine elevations. She is tolerating a regular diet and is passing flatus.   Voiding spontaneously and ambulating without difficulty. Denies N/V. Denies CP/SOB/lightheadedness/dizziness.     O:   Vitals:  Vital Signs Last 24 Hrs  T(C): 36.8 (2023 05:05), Max: 37.2 (2023 22:25)  T(F): 98.3 (2023 05:05), Max: 99 (2023 22:25)  HR: 100 (2023 05:05) (84 - 100)  BP: 140/84 (2023 05:05) (129/84 - 148/88)  BP(mean): --  RR: 18 (2023 05:05) (18 - 18)  SpO2: 100% (2023 05:05) (98% - 100%)    Parameters below as of 2023 05:05  Patient On (Oxygen Delivery Method): room air        MEDICATIONS  (STANDING):  acetaminophen     Tablet .. 975 milliGRAM(s) Oral <User Schedule>  diphtheria/tetanus/pertussis (acellular) Vaccine (Adacel) 0.5 milliLiter(s) IntraMuscular once  famotidine Injectable 20 milliGRAM(s) IV Push once  heparin   Injectable 5000 Unit(s) SubCutaneous every 12 hours  NIFEdipine XL 30 milliGRAM(s) Oral daily    MEDICATIONS  (PRN):  diphenhydrAMINE 25 milliGRAM(s) Oral every 6 hours PRN Pruritus  lanolin Ointment 1 Application(s) Topical every 6 hours PRN Sore Nipples  magnesium hydroxide Suspension 30 milliLiter(s) Oral two times a day PRN Constipation  metoclopramide 5 milliGRAM(s) Oral every 6 hours PRN nausea  oxyCODONE    IR 5 milliGRAM(s) Oral every 3 hours PRN Moderate to Severe Pain (4-10)  oxyCODONE    IR 5 milliGRAM(s) Oral once PRN Moderate to Severe Pain (4-10)  simethicone 80 milliGRAM(s) Chew every 4 hours PRN Gas      Labs:  Blood type: A Positive  Rubella IgG: RPR: Negative                          8.6<L>   13.13<H> >-----------< 151    (  05:38 )             25.8<L>                        10.2<L>   17.01<H> >-----------< 176    (  17:45 )             30.6<L>                        11.5   22.09<H> >-----------< 159    (  @ 05:55 )             34.3<L>                        11.5   16.24<H> >-----------< 121<L>    (  @ 15:30 )             34.5    23 @ 05:38      135  |  103  |  24<H>  ----------------------------<  100<H>  4.1   |  20<L>  |  1.23    23 @ 17:45      131<L>  |  100  |  22  ----------------------------<  103<H>  4.0   |  20<L>  |  1.25    23 @ 05:55      130<L>  |  100  |  19  ----------------------------<  96  4.2   |  16<L>  |  1.30    23 15:30      134<L>  |  105  |  19  ----------------------------<  147<H>  4.2   |  18<L>  |  1.30        Ca    7.5<L>      2023 05:38  Ca    7.5<L>      2023 17:45  Ca    7.9<L>      2023 05:55  Ca    7.7<L>      2023 15:30  Mg     9.20<HH>     06-18  Mg     >9.70     06-18  Mg     8.50<HH>     06-18  Mg     7.90<HH>     06-17  Mg     >9.70<HH>     06-17    TPro  5.0<L>  /  Alb  2.6<L>  /  TBili  <0.2  /  DBili  x   /  AST  27  /  ALT  14  /  AlkPhos  111  23 @ 05:38  TPro  5.5<L>  /  Alb  2.7<L>  /  TBili  <0.2  /  DBili  x   /  AST  31  /  ALT  17  /  AlkPhos  124<H>  23 @ 17:45  TPro  5.7<L>  /  Alb  2.7<L>  /  TBili  0.2  /  DBili  x   /  AST  30  /  ALT  21  /  AlkPhos  138<H>  23 @ 05:55  TPro  5.0<L>  /  Alb  2.7<L>  /  TBili  0.2  /  DBili  x   /  AST  33<H>  /  ALT  23  /  AlkPhos  138<H>  23 @ 15:30          PE:  General: NAD, patient resting comfortably in bed  Abdomen: Mildly distended, appropriately tender. No rebound tenderness or guarding. Incision c/d/i.  Extremities: SCDs in place, no erythema

## 2023-06-20 NOTE — PROGRESS NOTE ADULT - ATTENDING COMMENTS
Agree with assessment and plan. Routine postoperative care. Discharge planning
Pt seen and evaluated at bedside  Agree with above   POD 1 s/p pLTCS, doing well  Pain controlled   Continue postpartum care    iain JEFFERSON
saw and examined patient  aware need to watch BPs  feeling better

## 2023-06-21 VITALS
TEMPERATURE: 99 F | RESPIRATION RATE: 18 BRPM | DIASTOLIC BLOOD PRESSURE: 86 MMHG | HEART RATE: 94 BPM | SYSTOLIC BLOOD PRESSURE: 113 MMHG

## 2023-06-21 RX ADMIN — Medication 975 MILLIGRAM(S): at 08:23

## 2023-06-21 RX ADMIN — HEPARIN SODIUM 5000 UNIT(S): 5000 INJECTION INTRAVENOUS; SUBCUTANEOUS at 12:24

## 2023-06-21 RX ADMIN — Medication 975 MILLIGRAM(S): at 08:53

## 2023-06-21 RX ADMIN — Medication 975 MILLIGRAM(S): at 05:52

## 2023-06-21 RX ADMIN — Medication 975 MILLIGRAM(S): at 06:40

## 2023-06-21 RX ADMIN — SIMETHICONE 80 MILLIGRAM(S): 80 TABLET, CHEWABLE ORAL at 05:52

## 2023-06-21 RX ADMIN — HEPARIN SODIUM 5000 UNIT(S): 5000 INJECTION INTRAVENOUS; SUBCUTANEOUS at 01:44

## 2023-06-21 NOTE — PROGRESS NOTE ADULT - SUBJECTIVE AND OBJECTIVE BOX
OB Postpartum Note:  Delivery    S: 28yo now POD #4 s/p LTCS. Her pain is well controlled. She is tolerating a regular diet and passing flatus. Voiding spontaneously and ambulating without difficulty. Denies N/V. Denies CP/SOB/lightheadedness/dizziness.     O:   Vitals:  Vital Signs Last 24 Hrs  T(C): 36.7 (2023 06:15), Max: 37.2 (2023 10:00)  T(F): 98 (2023 06:15), Max: 99 (2023 10:00)  HR: 95 (2023 06:15) (93 - 100)  BP: 125/80 (2023 06:15) (125/80 - 142/85)  BP(mean): --  RR: 18 (2023 06:15) (18 - 18)  SpO2: 100% (2023 06:15) (98% - 100%)        MEDICATIONS  (STANDING):  acetaminophen     Tablet .. 975 milliGRAM(s) Oral <User Schedule>  diphtheria/tetanus/pertussis (acellular) Vaccine (Adacel) 0.5 milliLiter(s) IntraMuscular once  famotidine Injectable 20 milliGRAM(s) IV Push once  heparin   Injectable 5000 Unit(s) SubCutaneous every 12 hours  NIFEdipine XL 30 milliGRAM(s) Oral daily    MEDICATIONS  (PRN):  diphenhydrAMINE 25 milliGRAM(s) Oral every 6 hours PRN Pruritus  lanolin Ointment 1 Application(s) Topical every 6 hours PRN Sore Nipples  magnesium hydroxide Suspension 30 milliLiter(s) Oral two times a day PRN Constipation  metoclopramide 5 milliGRAM(s) Oral every 6 hours PRN nausea  oxyCODONE    IR 5 milliGRAM(s) Oral once PRN Moderate to Severe Pain (4-10)  oxyCODONE    IR 5 milliGRAM(s) Oral every 3 hours PRN Moderate to Severe Pain (4-10)  simethicone 80 milliGRAM(s) Chew every 4 hours PRN Gas      Labs:  Blood type: A Positive  Rubella IgG: RPR: Negative                          9.8<L>   15.20<H> >-----------< 192    (  @ 15:15 )             29.3<L>                        8.6<L>   13.13<H> >-----------< 151    (  @ 05:38 )             25.8<L>                        10.2<L>   17.01<H> >-----------< 176    (  @ 17:45 )             30.6<L>    23 @ 15:15      139  |  102  |  13  ----------------------------<  104<H>  4.4   |  25  |  0.70    23 @ 05:38      135  |  103  |  24<H>  ----------------------------<  100<H>  4.1   |  20<L>  |  1.23    23 @ 17:45      131<L>  |  100  |  22  ----------------------------<  103<H>  4.0   |  20<L>  |  1.25        Ca    8.6      2023 15:15  Ca    7.5<L>      2023 05:38  Ca    7.5<L>      2023 17:45  Mg     9.20<HH>         TPro  6.0  /  Alb  2.9<L>  /  TBili  <0.2  /  DBili  x   /  AST  29  /  ALT  19  /  AlkPhos  117  23 @ 15:15  TPro  5.0<L>  /  Alb  2.6<L>  /  TBili  <0.2  /  DBili  x   /  AST  27  /  ALT  14  /  AlkPhos  111  23 @ 05:38  TPro  5.5<L>  /  Alb  2.7<L>  /  TBili  <0.2  /  DBili  x   /  AST  31  /  ALT  17  /  AlkPhos  124<H>  23 @ 17:45          PE:  General: NAD, patient resting comfortably in bed  Abdomen: Mildly distended, appropriately tender. No rebound tenderness or guarding.   Incision: Clean, dry, intact.   Extremities: SCDs in place, no erythema.

## 2023-06-21 NOTE — PROGRESS NOTE ADULT - ASSESSMENT
A/P: 28yo POD#1 s/p pLTCS (elective) . Course cb sPEC.  Patient is stable and doing well post-operatively.    sPEC  -cw BP monitoringBP: 138/82 (06-18-23 @ 06:30) (92/58 - 168/104)  -Not on BP meds   -On Mg d/c Mg 6/18 at 14:09   -cw Mg checks as indicated  -f/u AM HELLP labs  -No PEC symptoms    ppCare  - Continue regular diet.  - Increase ambulation.  - Continue motrin, tylenol, oxycodone PRN for pain control.    - F/u AM CBC    Chau Mendieta PGY1
  A/P: 28yo POD #2 s/p pLTCS (elective) .  Patient is stable and doing well post-operatively.      #sPEC  -sp Mag, no medications, cont to monitor, if BP wnl, plan for discharge  - BPs over >139/80s, HELLP 6/19 still pending    #bipolar  - s/p SW consult    #routine  - Continue regular diet.  - Increase ambulation as tolerated.   - Standing Ibuprofen and Acetaminophen for pain, Oxycodone available PRN for severe pain.  - DVT prophylaxis with Heparin 5000u BID    Amyeo Jereen PGY-2
  A/P: 26yo POD #2 s/p pLTCS (elective) .  Patient is stable and doing well post-operatively.      #sPEC  -sp Mag, on Procardia 30XL started yesterday  - Bps well controlled w/ 130s-140s/70-80s  - c/w Procardia XL  - NSAIDs d'fabiola due to elevated creatinine  - Pain not well controlled, will reconsider NSAIDs  - Oxy PRN    #bipolar  - s/p SW consult    #routine  - Continue regular diet.  - Increase ambulation as tolerated.   - Standing Acetaminophen for pain, Oxycodone available PRN for severe pain.  - DVT prophylaxis with Heparin 5000u BID    Amyeo Jereen PGY-2  
A/P: 26yo POD #4 s/p pLTCS (elective) .  Patient is stable and doing well post-operatively.      #sPEC  -sp Mag, on Procardia 30XL started POD2  - Bps well controlled w/ 130s-140s/70-80s  - c/w Procardia XL  - NSAIDs d'fabiola due to elevated creatinine  - Pain not well controlled, will reconsider NSAIDs  - Oxy PRN    #bipolar  - s/p SW consult    #routine  - Continue regular diet.  - Increase ambulation as tolerated.   - Standing Acetaminophen for pain, Oxycodone available PRN for severe pain.  - DVT prophylaxis with Heparin 5000u BID    Amyeo Jereen PGY

## 2023-06-22 ENCOUNTER — NON-APPOINTMENT (OUTPATIENT)
Age: 28
End: 2023-06-22

## 2023-06-23 ENCOUNTER — NON-APPOINTMENT (OUTPATIENT)
Age: 28
End: 2023-06-23

## 2023-06-23 RX ORDER — IBUPROFEN 600 MG/1
600 TABLET, FILM COATED ORAL EVERY 6 HOURS
Qty: 56 | Refills: 0 | Status: ACTIVE | COMMUNITY
Start: 2023-06-23

## 2023-06-23 RX ORDER — URINE ACETONE TEST STRIPS
STRIP MISCELLANEOUS
Qty: 1 | Refills: 2 | Status: DISCONTINUED | COMMUNITY
Start: 2023-06-12 | End: 2023-06-23

## 2023-06-23 RX ORDER — VALACYCLOVIR 1 G/1
1 TABLET, FILM COATED ORAL DAILY
Qty: 90 | Refills: 2 | Status: DISCONTINUED | COMMUNITY
Start: 2023-06-01 | End: 2023-06-23

## 2023-06-23 RX ORDER — ERGOCALCIFEROL 1.25 MG/1
1.25 MG CAPSULE, LIQUID FILLED ORAL
Qty: 4 | Refills: 0 | Status: DISCONTINUED | COMMUNITY
Start: 2021-05-28 | End: 2023-06-23

## 2023-06-23 RX ORDER — VALACYCLOVIR 1 G/1
1 TABLET, FILM COATED ORAL DAILY
Qty: 30 | Refills: 1 | Status: DISCONTINUED | COMMUNITY
Start: 2021-12-16 | End: 2023-06-23

## 2023-06-23 RX ORDER — METOCLOPRAMIDE 10 MG/1
10 TABLET ORAL 3 TIMES DAILY
Qty: 30 | Refills: 3 | Status: DISCONTINUED | COMMUNITY
Start: 2023-01-05 | End: 2023-06-23

## 2023-06-23 RX ORDER — DOXYLAMINE SUCCINATE AND PYRIDOXINE HYDROCHLORIDE 10; 10 MG/1; MG/1
10-10 TABLET, DELAYED RELEASE ORAL
Qty: 60 | Refills: 6 | Status: DISCONTINUED | COMMUNITY
Start: 2022-11-03 | End: 2023-06-23

## 2023-06-23 RX ORDER — DOXYLAMINE SUCCINATE AND PYRIDOXINE HYDROCHLORIDE 10; 10 MG/1; MG/1
10-10 TABLET, DELAYED RELEASE ORAL
Refills: 0 | Status: DISCONTINUED | COMMUNITY
Start: 2023-05-12 | End: 2023-06-23

## 2023-06-23 RX ORDER — VALACYCLOVIR 1 G/1
1 TABLET, FILM COATED ORAL
Qty: 20 | Refills: 2 | Status: DISCONTINUED | COMMUNITY
Start: 2021-12-10 | End: 2023-06-23

## 2023-06-23 RX ORDER — ACETAMINOPHEN 325 MG/1
325 TABLET ORAL
Refills: 0 | Status: ACTIVE | COMMUNITY
Start: 2023-06-23

## 2023-06-23 RX ORDER — ONDANSETRON 4 MG/1
4 TABLET ORAL EVERY 6 HOURS
Qty: 1 | Refills: 3 | Status: DISCONTINUED | COMMUNITY
Start: 2022-12-01 | End: 2023-06-23

## 2023-06-23 RX ORDER — LANCETS 33 GAUGE
EACH MISCELLANEOUS
Qty: 4 | Refills: 2 | Status: DISCONTINUED | COMMUNITY
Start: 2023-06-12 | End: 2023-06-23

## 2023-06-23 RX ORDER — BLOOD-GLUCOSE METER
W/DEVICE KIT MISCELLANEOUS
Qty: 1 | Refills: 0 | Status: DISCONTINUED | COMMUNITY
Start: 2023-06-12 | End: 2023-06-23

## 2023-06-23 RX ORDER — BLOOD-GLUCOSE METER
KIT MISCELLANEOUS
Qty: 2 | Refills: 0 | Status: DISCONTINUED | COMMUNITY
Start: 2023-06-12 | End: 2023-06-23

## 2023-06-27 ENCOUNTER — NON-APPOINTMENT (OUTPATIENT)
Age: 28
End: 2023-06-27

## 2023-06-27 ENCOUNTER — TRANSCRIPTION ENCOUNTER (OUTPATIENT)
Age: 28
End: 2023-06-27

## 2023-06-27 ENCOUNTER — APPOINTMENT (OUTPATIENT)
Dept: OBGYN | Facility: CLINIC | Age: 28
End: 2023-06-27
Payer: COMMERCIAL

## 2023-06-27 PROBLEM — F41.9 ANXIETY DISORDER, UNSPECIFIED: Chronic | Status: ACTIVE | Noted: 2023-06-14

## 2023-06-27 PROCEDURE — 99211 OFF/OP EST MAY X REQ PHY/QHP: CPT

## 2023-06-29 ENCOUNTER — NON-APPOINTMENT (OUTPATIENT)
Age: 28
End: 2023-06-29

## 2023-06-30 ENCOUNTER — NON-APPOINTMENT (OUTPATIENT)
Age: 28
End: 2023-06-30

## 2023-06-30 ENCOUNTER — APPOINTMENT (OUTPATIENT)
Dept: CARDIOLOGY | Facility: CLINIC | Age: 28
End: 2023-06-30
Payer: COMMERCIAL

## 2023-06-30 VITALS
TEMPERATURE: 99.1 F | BODY MASS INDEX: 29.95 KG/M2 | WEIGHT: 169 LBS | DIASTOLIC BLOOD PRESSURE: 60 MMHG | OXYGEN SATURATION: 97 % | HEART RATE: 100 BPM | SYSTOLIC BLOOD PRESSURE: 110 MMHG | RESPIRATION RATE: 18 BRPM | HEIGHT: 63 IN

## 2023-06-30 DIAGNOSIS — R00.2 PALPITATIONS: ICD-10-CM

## 2023-06-30 DIAGNOSIS — R79.89 OTHER SPECIFIED ABNORMAL FINDINGS OF BLOOD CHEMISTRY: ICD-10-CM

## 2023-06-30 DIAGNOSIS — R00.0 TACHYCARDIA, UNSPECIFIED: ICD-10-CM

## 2023-06-30 DIAGNOSIS — O16.9 UNSPECIFIED MATERNAL HYPERTENSION, UNSPECIFIED TRIMESTER: ICD-10-CM

## 2023-06-30 PROCEDURE — 99214 OFFICE O/P EST MOD 30 MIN: CPT | Mod: 25

## 2023-06-30 PROCEDURE — 93000 ELECTROCARDIOGRAM COMPLETE: CPT

## 2023-06-30 RX ORDER — NIFEDIPINE 30 MG/1
30 TABLET, EXTENDED RELEASE ORAL
Qty: 30 | Refills: 0 | Status: DISCONTINUED | COMMUNITY
Start: 2023-06-23 | End: 2023-06-30

## 2023-06-30 NOTE — HISTORY OF PRESENT ILLNESS
[FreeTextEntry1] : This is a 26 y/o female with a pmhx of palpitations here for a follow up. She is now post partum, s/p C section 6/17/23, c/b pre eclampsia treated with magnesium sulfate. Patient started on procardia 30XL. Patient   followed up with OB for BP check which was stable. Patient admits to slight headache, occassional dizziness. \par Patient denies chest pain, dyspnea, palpitations, syncope, changes in bowel/bladder habits or appetite.

## 2023-07-03 ENCOUNTER — NON-APPOINTMENT (OUTPATIENT)
Age: 28
End: 2023-07-03

## 2023-07-05 ENCOUNTER — APPOINTMENT (OUTPATIENT)
Dept: OBGYN | Facility: CLINIC | Age: 28
End: 2023-07-05
Payer: COMMERCIAL

## 2023-07-05 PROCEDURE — 99211 OFF/OP EST MAY X REQ PHY/QHP: CPT

## 2023-07-06 DIAGNOSIS — D64.9 ANEMIA, UNSPECIFIED: ICD-10-CM

## 2023-07-11 ENCOUNTER — NON-APPOINTMENT (OUTPATIENT)
Age: 28
End: 2023-07-11

## 2023-07-19 ENCOUNTER — NON-APPOINTMENT (OUTPATIENT)
Age: 28
End: 2023-07-19

## 2023-07-21 ENCOUNTER — APPOINTMENT (OUTPATIENT)
Dept: CARDIOLOGY | Facility: CLINIC | Age: 28
End: 2023-07-21

## 2023-08-07 ENCOUNTER — APPOINTMENT (OUTPATIENT)
Dept: OBGYN | Facility: CLINIC | Age: 28
End: 2023-08-07
Payer: COMMERCIAL

## 2023-08-07 VITALS
HEART RATE: 78 BPM | WEIGHT: 163 LBS | HEIGHT: 63 IN | BODY MASS INDEX: 28.88 KG/M2 | SYSTOLIC BLOOD PRESSURE: 105 MMHG | DIASTOLIC BLOOD PRESSURE: 69 MMHG

## 2023-08-07 DIAGNOSIS — Z01.818 ENCOUNTER FOR OTHER PREPROCEDURAL EXAMINATION: ICD-10-CM

## 2023-08-07 PROCEDURE — 0503F POSTPARTUM CARE VISIT: CPT

## 2023-08-07 RX ORDER — ALPRAZOLAM 1 MG/1
1 TABLET, EXTENDED RELEASE ORAL DAILY
Qty: 2 | Refills: 0 | Status: ACTIVE | COMMUNITY
Start: 2023-08-07 | End: 1900-01-01

## 2023-08-07 NOTE — HISTORY OF PRESENT ILLNESS
[Postpartum Follow Up] : postpartum follow up [Delivery Date: ___] : on [unfilled] [Male] : Delivery History: baby boy [Boy] : baby is a boy [Infant's Name ___] : [unfilled] [___ Lbs] : [unfilled] lbs [___ Oz] : [unfilled] oz [Circumcised] : circumcised [Living at Home] : is currently living at home [Bottle Feeding] : bottle feeding [Breastfeeding] : currently nursing [Primary C/S] : delivered by  section [Pertussis Vaccine] : Pertussis vaccine administered [Clean/Dry/Intact] : clean, dry and intact [Back to Normal] : is back to normal in size [Mild] : mild vaginal bleeding [Normal] : the vagina was normal [Not Done] : Examination of breasts not done [Doing Well] : is doing well [No Sign of Infection] : is showing no signs of infection [Excellent Pain Control] : has excellent pain control [None] : None [Last Pap Date: ___] : Last Pap Date: [unfilled] [Intended Contraception] : Intended Contraception: [IUD] : intrauterine device [Complications:___] : no complications [Rhogam] : Rhogam was not administered [Rubella Vaccine] : Rubella vaccine was not administered [BTL] : no tubal ligation [Resumed Menses] : has not resumed her menses [Resumed Tull] : has not resumed intercourse [S/Sx PP Depression] : no signs/symptoms of postpartum depression [Erythema] : not erythematous [Cervix Sample Taken] : cervical sample not taken for a Pap smear [FreeTextEntry8] : off lamictal and seeing therapist [de-identified] : pumping and giving formula

## 2023-08-10 ENCOUNTER — APPOINTMENT (OUTPATIENT)
Dept: OBGYN | Facility: CLINIC | Age: 28
End: 2023-08-10
Payer: COMMERCIAL

## 2023-08-10 ENCOUNTER — ASOB RESULT (OUTPATIENT)
Age: 28
End: 2023-08-10

## 2023-08-10 VITALS
HEIGHT: 63 IN | HEART RATE: 87 BPM | DIASTOLIC BLOOD PRESSURE: 66 MMHG | BODY MASS INDEX: 28.35 KG/M2 | SYSTOLIC BLOOD PRESSURE: 98 MMHG | WEIGHT: 160 LBS

## 2023-08-10 DIAGNOSIS — Z30.430 ENCOUNTER FOR INSERTION OF INTRAUTERINE CONTRACEPTIVE DEVICE: ICD-10-CM

## 2023-08-10 LAB
HCG UR QL: NEGATIVE
QUALITY CONTROL: YES

## 2023-08-10 PROCEDURE — 58300 INSERT INTRAUTERINE DEVICE: CPT

## 2023-08-10 PROCEDURE — 76830 TRANSVAGINAL US NON-OB: CPT

## 2023-08-10 NOTE — PROCEDURE
[IUD Placement] : intrauterine device (IUD) placement [Time out performed] : Pre-procedure time out performed.  Patient's name, date of birth and procedure confirmed. [Consent Obtained] : Consent obtained [Risks] : risks [Benefits] : benefits [Alternatives] : alternatives [Patient] : patient [Infection] : infection [Bleeding] : bleeding [Pain] : pain [Expulsion] : expulsion [Failure] : failure [Uterine Perforation] : uterine perforation [Neg Pregnancy Test] : negative pregnancy test [Betadine] : Betadine [Easy Passage] : Easy passage [Sounded to ___ cm] : sounded to [unfilled] ~Ucm [Kyleena IUD] : Kyleena IUD [Post Placement Transvag. US] : post placement transvaginal ultrasound [Tolerated Well] : Patient tolerated the procedure well [No Complications] : No complications [None] : None [de-identified] : allis

## 2024-05-07 LAB
ANION GAP SERPL CALC-SCNC: 16 MMOL/L
APPEARANCE: CLEAR
BACTERIA UR CULT: ABNORMAL
BACTERIA: ABNORMAL /HPF
BILIRUBIN URINE: NEGATIVE
BLOOD URINE: NEGATIVE
BUN SERPL-MCNC: 14 MG/DL
CALCIUM SERPL-MCNC: 9.2 MG/DL
CAST: 1 /LPF
CHLORIDE SERPL-SCNC: 101 MMOL/L
CO2 SERPL-SCNC: 24 MMOL/L
COLOR: YELLOW
CREAT SERPL-MCNC: 0.77 MG/DL
EGFR: 110 ML/MIN/1.73M2
EPITHELIAL CELLS: 6 /HPF
FERRITIN SERPL-MCNC: 101 NG/ML
GLUCOSE QUALITATIVE U: NEGATIVE MG/DL
GLUCOSE SERPL-MCNC: 83 MG/DL
IRON SATN MFR SERPL: 15 %
IRON SERPL-MCNC: 58 UG/DL
KETONES URINE: NEGATIVE MG/DL
LEUKOCYTE ESTERASE URINE: NEGATIVE
MICROSCOPIC-UA: NORMAL
NITRITE URINE: NEGATIVE
PH URINE: 6.5
POTASSIUM SERPL-SCNC: 4.6 MMOL/L
PROTEIN URINE: NEGATIVE MG/DL
RED BLOOD CELLS URINE: 0 /HPF
REVIEW: NORMAL
SODIUM SERPL-SCNC: 141 MMOL/L
SPECIFIC GRAVITY URINE: 1.02
T4 FREE SERPL-MCNC: 1.4 NG/DL
TIBC SERPL-MCNC: 373 UG/DL
TSH SERPL-ACNC: 0.56 UIU/ML
UIBC SERPL-MCNC: 315 UG/DL
UROBILINOGEN URINE: 0.2 MG/DL
WHITE BLOOD CELLS URINE: 1 /HPF

## 2024-07-29 NOTE — ED ADULT NURSE NOTE - DRUG PRE-SCREENING (DAST -1)
methylphenidate HCl (QUILLICHEW ER) 30 mg cb24   Allergies - reviewed  06/18/2024 - PG  
Statement Selected

## 2024-09-13 ENCOUNTER — APPOINTMENT (OUTPATIENT)
Dept: DERMATOLOGY | Facility: CLINIC | Age: 29
End: 2024-09-13
Payer: COMMERCIAL

## 2024-09-13 VITALS — BODY MASS INDEX: 25.69 KG/M2 | WEIGHT: 145 LBS | HEIGHT: 63 IN

## 2024-09-13 DIAGNOSIS — Z12.83 ENCOUNTER FOR SCREENING FOR MALIGNANT NEOPLASM OF SKIN: ICD-10-CM

## 2024-09-13 DIAGNOSIS — L70.0 ACNE VULGARIS: ICD-10-CM

## 2024-09-13 DIAGNOSIS — D49.2 NEOPLASM OF UNSPECIFIED BEHAVIOR OF BONE, SOFT TISSUE, AND SKIN: ICD-10-CM

## 2024-09-13 PROCEDURE — 99204 OFFICE O/P NEW MOD 45 MIN: CPT

## 2024-09-13 RX ORDER — TRETINOIN 0.25 MG/G
0.03 CREAM TOPICAL
Qty: 1 | Refills: 3 | Status: ACTIVE | COMMUNITY
Start: 2024-09-13 | End: 1900-01-01

## 2024-09-13 NOTE — HISTORY OF PRESENT ILLNESS
[FreeTextEntry1] : skin growths  [de-identified] : 28 yo new pt here for evaluation of skin moles  has growth on right temple - present for many years but recently got irritated  would like removed  mother with moles removed also gets bumps on face

## 2024-09-13 NOTE — ASSESSMENT
[FreeTextEntry1] :  Acne vulgaris, mild/moderate comedonal Chronic, flaring  Favor trial of topicals first   - tretinoin 0.025% cream - pea sized amount diffusely over face at night. Start every other night for 1st two weeks and increase gradually as tolerated to minimize side effects of dryness, irritation. use gentle face wash and moisturizer prior to application - Reviewed expected time course of improving on topical regimen (can take 6-8 weeks for earliest signs of improvement; 3-6 months should reach maximum anticipated improvement)  Neoplasm of uncertain behavior of skin - favor benign keratosis vs nevus, traumatized given traumatized, reasonable to remove referral to Dr. Dozier for removal

## 2024-09-13 NOTE — PHYSICAL EXAM
[FreeTextEntry3] : AAOx3, NAD, well-appearing / pleasant Total body skin exam performed with the exception of:  limited genital/groin evaluation, limited scalp evaluation All examined areas within normal limits with the exception of comedones on forehead stuck on brown papule on right temple

## 2024-09-16 ENCOUNTER — NON-APPOINTMENT (OUTPATIENT)
Age: 29
End: 2024-09-16

## 2024-09-23 ENCOUNTER — NON-APPOINTMENT (OUTPATIENT)
Age: 29
End: 2024-09-23

## 2024-09-26 ENCOUNTER — APPOINTMENT (OUTPATIENT)
Dept: DERMATOLOGY | Facility: CLINIC | Age: 29
End: 2024-09-26
Payer: COMMERCIAL

## 2024-09-26 DIAGNOSIS — D49.2 NEOPLASM OF UNSPECIFIED BEHAVIOR OF BONE, SOFT TISSUE, AND SKIN: ICD-10-CM

## 2024-09-26 PROCEDURE — 99213 OFFICE O/P EST LOW 20 MIN: CPT

## 2024-09-26 NOTE — ASSESSMENT
[FreeTextEntry1] : Neoplasm of uncertain behavior of skin - favor epidermal nevus vs VV, traumatized discussed small shave biopsy but fine to wait until upcoming excision with plastic surgery proceed with excision with plastic surgery, will f/u results  RTC PRN

## 2024-09-26 NOTE — HISTORY OF PRESENT ILLNESS
[FreeTextEntry1] : fu growth [de-identified] : MATTHIAS YEE is a 29 year old F here for evaluation of below  has growth on right temple - present for many years but recently got irritated--has appt with plastic surgery for excision 10/10, but noticed a bit more irritation since LV and wanted to ensure no earlier tx required

## 2024-10-09 ENCOUNTER — APPOINTMENT (OUTPATIENT)
Dept: OBGYN | Facility: CLINIC | Age: 29
End: 2024-10-09
Payer: COMMERCIAL

## 2024-10-09 VITALS
SYSTOLIC BLOOD PRESSURE: 107 MMHG | HEART RATE: 90 BPM | HEIGHT: 63 IN | WEIGHT: 148 LBS | BODY MASS INDEX: 26.22 KG/M2 | DIASTOLIC BLOOD PRESSURE: 76 MMHG

## 2024-10-09 DIAGNOSIS — Z87.59 PERSONAL HISTORY OF OTHER COMPLICATIONS OF PREGNANCY, CHILDBIRTH AND THE PUERPERIUM: ICD-10-CM

## 2024-10-09 DIAGNOSIS — L29.9 DISEASES OF THE SKIN AND SUBCUTANEOUS TISSUE COMPLICATING PREGNANCY, THIRD TRIMESTER: ICD-10-CM

## 2024-10-09 DIAGNOSIS — O12.13 GESTATIONAL PROTEINURIA, THIRD TRIMESTER: ICD-10-CM

## 2024-10-09 DIAGNOSIS — O24.419 GESTATIONAL DIABETES MELLITUS IN PREGNANCY, UNSPECIFIED CONTROL: ICD-10-CM

## 2024-10-09 DIAGNOSIS — N63.21 UNSPECIFIED LUMP IN THE LEFT BREAST, UPPER OUTER QUADRANT: ICD-10-CM

## 2024-10-09 DIAGNOSIS — O99.713 DISEASES OF THE SKIN AND SUBCUTANEOUS TISSUE COMPLICATING PREGNANCY, THIRD TRIMESTER: ICD-10-CM

## 2024-10-09 DIAGNOSIS — Z23 ENCOUNTER FOR IMMUNIZATION: ICD-10-CM

## 2024-10-09 PROCEDURE — 99213 OFFICE O/P EST LOW 20 MIN: CPT

## 2024-10-09 RX ORDER — LEVONORGESTREL 19.5 MG/1
INTRAUTERINE DEVICE INTRAUTERINE
Refills: 0 | Status: ACTIVE | COMMUNITY

## 2024-10-10 ENCOUNTER — APPOINTMENT (OUTPATIENT)
Dept: PLASTIC SURGERY | Facility: CLINIC | Age: 29
End: 2024-10-10
Payer: COMMERCIAL

## 2024-10-10 VITALS — WEIGHT: 148 LBS | BODY MASS INDEX: 26.22 KG/M2 | HEIGHT: 63 IN

## 2024-10-10 PROBLEM — D48.5 NEOPLASM OF UNCERTAIN BEHAVIOR OF SKIN: Status: ACTIVE | Noted: 2024-10-10

## 2024-10-10 PROCEDURE — 99203 OFFICE O/P NEW LOW 30 MIN: CPT

## 2024-10-11 ENCOUNTER — APPOINTMENT (OUTPATIENT)
Dept: ULTRASOUND IMAGING | Facility: CLINIC | Age: 29
End: 2024-10-11
Payer: COMMERCIAL

## 2024-10-11 ENCOUNTER — OUTPATIENT (OUTPATIENT)
Dept: OUTPATIENT SERVICES | Facility: HOSPITAL | Age: 29
LOS: 1 days | End: 2024-10-11
Payer: COMMERCIAL

## 2024-10-11 ENCOUNTER — RESULT REVIEW (OUTPATIENT)
Age: 29
End: 2024-10-11

## 2024-10-11 DIAGNOSIS — Z98.1 ARTHRODESIS STATUS: Chronic | ICD-10-CM

## 2024-10-11 DIAGNOSIS — Z98.890 OTHER SPECIFIED POSTPROCEDURAL STATES: Chronic | ICD-10-CM

## 2024-10-11 DIAGNOSIS — N63.21 UNSPECIFIED LUMP IN THE LEFT BREAST, UPPER OUTER QUADRANT: ICD-10-CM

## 2024-10-11 PROCEDURE — 76641 ULTRASOUND BREAST COMPLETE: CPT

## 2024-10-11 PROCEDURE — 76641 ULTRASOUND BREAST COMPLETE: CPT | Mod: 26,LT

## 2024-10-15 ENCOUNTER — LABORATORY RESULT (OUTPATIENT)
Age: 29
End: 2024-10-15

## 2024-10-15 ENCOUNTER — APPOINTMENT (OUTPATIENT)
Dept: PLASTIC SURGERY | Facility: CLINIC | Age: 29
End: 2024-10-15
Payer: COMMERCIAL

## 2024-10-15 DIAGNOSIS — D48.5 NEOPLASM OF UNCERTAIN BEHAVIOR OF SKIN: ICD-10-CM

## 2024-10-15 PROBLEM — Z98.891 HISTORY OF CESAREAN SECTION: Status: RESOLVED | Noted: 2023-06-23 | Resolved: 2024-10-15

## 2024-10-15 PROBLEM — Z86.32 HISTORY OF GESTATIONAL DIABETES: Status: ACTIVE | Noted: 2024-10-15 | Resolved: 2025-01-13

## 2024-10-15 PROBLEM — O16.9 ELEVATED BLOOD PRESSURE AFFECTING PREGNANCY, ANTEPARTUM: Status: RESOLVED | Noted: 2023-06-13 | Resolved: 2024-10-15

## 2024-10-15 PROCEDURE — 13131 CMPLX RPR F/C/C/M/N/AX/G/H/F: CPT

## 2024-10-15 PROCEDURE — 11442 EXC FACE-MM B9+MARG 1.1-2 CM: CPT

## 2024-10-18 ENCOUNTER — APPOINTMENT (OUTPATIENT)
Dept: OBGYN | Facility: CLINIC | Age: 29
End: 2024-10-18

## 2024-10-18 DIAGNOSIS — O16.9 UNSPECIFIED MATERNAL HYPERTENSION, UNSPECIFIED TRIMESTER: ICD-10-CM

## 2024-10-18 DIAGNOSIS — Z98.891 HISTORY OF UTERINE SCAR FROM PREVIOUS SURGERY: ICD-10-CM

## 2024-10-18 DIAGNOSIS — Z86.32 PERSONAL HISTORY OF GESTATIONAL DIABETES: ICD-10-CM

## 2024-10-24 ENCOUNTER — APPOINTMENT (OUTPATIENT)
Dept: PLASTIC SURGERY | Facility: CLINIC | Age: 29
End: 2024-10-24
Payer: COMMERCIAL

## 2024-10-24 DIAGNOSIS — D49.2 NEOPLASM OF UNSPECIFIED BEHAVIOR OF BONE, SOFT TISSUE, AND SKIN: ICD-10-CM

## 2024-10-24 PROCEDURE — 99024 POSTOP FOLLOW-UP VISIT: CPT

## 2024-10-29 ENCOUNTER — NON-APPOINTMENT (OUTPATIENT)
Age: 29
End: 2024-10-29

## 2025-03-28 ENCOUNTER — APPOINTMENT (OUTPATIENT)
Dept: INTERNAL MEDICINE | Facility: CLINIC | Age: 30
End: 2025-03-28
Payer: COMMERCIAL

## 2025-03-28 VITALS
WEIGHT: 146 LBS | HEART RATE: 83 BPM | DIASTOLIC BLOOD PRESSURE: 72 MMHG | BODY MASS INDEX: 25.87 KG/M2 | HEIGHT: 63 IN | OXYGEN SATURATION: 98 % | TEMPERATURE: 98.5 F | SYSTOLIC BLOOD PRESSURE: 104 MMHG

## 2025-03-28 DIAGNOSIS — Z13.228 ENCOUNTER FOR SCREENING FOR OTHER METABOLIC DISORDERS: ICD-10-CM

## 2025-03-28 LAB — GLUCOSE BLDC GLUCOMTR-MCNC: 107

## 2025-03-28 PROCEDURE — 82962 GLUCOSE BLOOD TEST: CPT

## 2025-03-28 PROCEDURE — 99204 OFFICE O/P NEW MOD 45 MIN: CPT | Mod: 25

## 2025-03-29 LAB
25(OH)D3 SERPL-MCNC: 30.4 NG/ML
ALBUMIN SERPL ELPH-MCNC: 4.9 G/DL
ALP BLD-CCNC: 65 U/L
ALT SERPL-CCNC: 15 U/L
ANION GAP SERPL CALC-SCNC: 12 MMOL/L
APPEARANCE: CLEAR
AST SERPL-CCNC: 17 U/L
BASOPHILS # BLD AUTO: 0.04 K/UL
BASOPHILS NFR BLD AUTO: 0.8 %
BILIRUB DIRECT SERPL-MCNC: 0.2 MG/DL
BILIRUB INDIRECT SERPL-MCNC: 0.6 MG/DL
BILIRUB SERPL-MCNC: 0.8 MG/DL
BILIRUBIN URINE: NEGATIVE
BLOOD URINE: NEGATIVE
BUN SERPL-MCNC: 19 MG/DL
CALCIUM SERPL-MCNC: 10.1 MG/DL
CHLORIDE SERPL-SCNC: 104 MMOL/L
CHOLEST SERPL-MCNC: 211 MG/DL
CO2 SERPL-SCNC: 28 MMOL/L
COLOR: YELLOW
CREAT SERPL-MCNC: 0.74 MG/DL
EGFRCR SERPLBLD CKD-EPI 2021: 114 ML/MIN/1.73M2
EOSINOPHIL # BLD AUTO: 0.15 K/UL
EOSINOPHIL NFR BLD AUTO: 2.9 %
ESTIMATED AVERAGE GLUCOSE: 97 MG/DL
GLUCOSE QUALITATIVE U: NEGATIVE MG/DL
GLUCOSE SERPL-MCNC: 66 MG/DL
HBA1C MFR BLD HPLC: 5 %
HCT VFR BLD CALC: 42.8 %
HDLC SERPL-MCNC: 65 MG/DL
HGB BLD-MCNC: 14.5 G/DL
IMM GRANULOCYTES NFR BLD AUTO: 0.2 %
KETONES URINE: NEGATIVE MG/DL
LDLC SERPL-MCNC: 135 MG/DL
LEUKOCYTE ESTERASE URINE: NEGATIVE
LYMPHOCYTES # BLD AUTO: 1.65 K/UL
LYMPHOCYTES NFR BLD AUTO: 31.5 %
MAGNESIUM SERPL-MCNC: 2.2 MG/DL
MAN DIFF?: NORMAL
MCHC RBC-ENTMCNC: 30.8 PG
MCHC RBC-ENTMCNC: 33.9 G/DL
MCV RBC AUTO: 90.9 FL
MONOCYTES # BLD AUTO: 0.27 K/UL
MONOCYTES NFR BLD AUTO: 5.2 %
NEUTROPHILS # BLD AUTO: 3.12 K/UL
NEUTROPHILS NFR BLD AUTO: 59.4 %
NITRITE URINE: NEGATIVE
NONHDLC SERPL-MCNC: 145 MG/DL
PH URINE: 7
PLATELET # BLD AUTO: 246 K/UL
POTASSIUM SERPL-SCNC: 5.3 MMOL/L
PROT SERPL-MCNC: 7.3 G/DL
PROTEIN URINE: NORMAL MG/DL
RBC # BLD: 4.71 M/UL
RBC # FLD: 12.5 %
SODIUM SERPL-SCNC: 145 MMOL/L
SPECIFIC GRAVITY URINE: 1.02
T4 FREE SERPL-MCNC: 1.3 NG/DL
TRIGL SERPL-MCNC: 59 MG/DL
TSH SERPL-ACNC: 0.79 UIU/ML
UROBILINOGEN URINE: 0.2 MG/DL
VIT B12 SERPL-MCNC: 566 PG/ML
WBC # FLD AUTO: 5.24 K/UL

## 2025-03-31 ENCOUNTER — TRANSCRIPTION ENCOUNTER (OUTPATIENT)
Age: 30
End: 2025-03-31

## 2025-04-07 ENCOUNTER — NON-APPOINTMENT (OUTPATIENT)
Age: 30
End: 2025-04-07

## 2025-04-07 ENCOUNTER — APPOINTMENT (OUTPATIENT)
Dept: OBGYN | Facility: CLINIC | Age: 30
End: 2025-04-07
Payer: COMMERCIAL

## 2025-04-07 VITALS
HEIGHT: 63 IN | DIASTOLIC BLOOD PRESSURE: 65 MMHG | SYSTOLIC BLOOD PRESSURE: 109 MMHG | WEIGHT: 143 LBS | HEART RATE: 99 BPM | BODY MASS INDEX: 25.34 KG/M2

## 2025-04-07 DIAGNOSIS — N89.8 OTHER SPECIFIED NONINFLAMMATORY DISORDERS OF VAGINA: ICD-10-CM

## 2025-04-07 DIAGNOSIS — Z01.419 ENCOUNTER FOR GYNECOLOGICAL EXAMINATION (GENERAL) (ROUTINE) W/OUT ABNORMAL FINDINGS: ICD-10-CM

## 2025-04-07 DIAGNOSIS — Z87.898 PERSONAL HISTORY OF OTHER SPECIFIED CONDITIONS: ICD-10-CM

## 2025-04-07 DIAGNOSIS — F41.9 ANXIETY DISORDER, UNSPECIFIED: ICD-10-CM

## 2025-04-07 DIAGNOSIS — Z01.818 ENCOUNTER FOR OTHER PREPROCEDURAL EXAMINATION: ICD-10-CM

## 2025-04-07 DIAGNOSIS — F60.3 BORDERLINE PERSONALITY DISORDER: ICD-10-CM

## 2025-04-07 PROCEDURE — 99395 PREV VISIT EST AGE 18-39: CPT

## 2025-04-14 ENCOUNTER — NON-APPOINTMENT (OUTPATIENT)
Age: 30
End: 2025-04-14

## 2025-04-14 LAB — CYTOLOGY CVX/VAG DOC THIN PREP: NORMAL

## 2025-07-21 ENCOUNTER — APPOINTMENT (OUTPATIENT)
Dept: OBGYN | Facility: CLINIC | Age: 30
End: 2025-07-21
Payer: COMMERCIAL

## 2025-07-21 VITALS
BODY MASS INDEX: 25.34 KG/M2 | HEIGHT: 63 IN | HEART RATE: 90 BPM | WEIGHT: 143 LBS | DIASTOLIC BLOOD PRESSURE: 63 MMHG | SYSTOLIC BLOOD PRESSURE: 97 MMHG

## 2025-07-21 DIAGNOSIS — Z30.432 ENCOUNTER FOR REMOVAL OF INTRAUTERINE CONTRACEPTIVE DEVICE: ICD-10-CM

## 2025-07-21 PROCEDURE — 58301 REMOVE INTRAUTERINE DEVICE: CPT

## 2025-07-21 RX ORDER — UBIDECARENONE 50 MG
CAPSULE ORAL
Refills: 0 | Status: ACTIVE | COMMUNITY

## 2025-07-21 RX ORDER — LAMOTRIGINE 2 MG/1
TABLET, FOR SUSPENSION ORAL
Refills: 0 | Status: ACTIVE | COMMUNITY

## 2025-09-18 ENCOUNTER — ASOB RESULT (OUTPATIENT)
Age: 30
End: 2025-09-18

## 2025-09-18 ENCOUNTER — APPOINTMENT (OUTPATIENT)
Dept: OBGYN | Facility: CLINIC | Age: 30
End: 2025-09-18
Payer: COMMERCIAL

## 2025-09-18 ENCOUNTER — NON-APPOINTMENT (OUTPATIENT)
Age: 30
End: 2025-09-18

## 2025-09-18 VITALS
DIASTOLIC BLOOD PRESSURE: 61 MMHG | HEART RATE: 87 BPM | WEIGHT: 143 LBS | SYSTOLIC BLOOD PRESSURE: 103 MMHG | HEIGHT: 63 IN | BODY MASS INDEX: 25.34 KG/M2

## 2025-09-18 DIAGNOSIS — N91.2 AMENORRHEA, UNSPECIFIED: ICD-10-CM

## 2025-09-18 DIAGNOSIS — O21.0 MILD HYPEREMESIS GRAVIDARUM: ICD-10-CM

## 2025-09-18 PROCEDURE — 99459 PELVIC EXAMINATION: CPT

## 2025-09-18 PROCEDURE — 99214 OFFICE O/P EST MOD 30 MIN: CPT

## 2025-09-18 PROCEDURE — 76830 TRANSVAGINAL US NON-OB: CPT

## 2025-09-18 RX ORDER — DOXYLAMINE SUCCINATE AND PYRIDOXINE HYDROCHLORIDE 10; 10 MG/1; MG/1
10-10 TABLET, DELAYED RELEASE ORAL
Qty: 120 | Refills: 1 | Status: ACTIVE | COMMUNITY
Start: 2025-09-18 | End: 1900-01-01

## 2025-09-19 RX ORDER — PRENATAL VIT 49/IRON FUM/FOLIC 6.75-0.2MG
TABLET ORAL
Refills: 0 | Status: ACTIVE | COMMUNITY

## 2025-09-20 LAB
BACTERIA UR CULT: NORMAL
C TRACH RRNA SPEC QL NAA+PROBE: NOT DETECTED
N GONORRHOEA RRNA SPEC QL NAA+PROBE: NOT DETECTED
SOURCE AMPLIFICATION: NORMAL

## (undated) DEVICE — DRSG DERMABOND PRINEO 42CM